# Patient Record
Sex: MALE | Race: BLACK OR AFRICAN AMERICAN | Employment: UNEMPLOYED | URBAN - METROPOLITAN AREA
[De-identification: names, ages, dates, MRNs, and addresses within clinical notes are randomized per-mention and may not be internally consistent; named-entity substitution may affect disease eponyms.]

---

## 2017-01-09 DIAGNOSIS — M54.9 OTHER CHRONIC BACK PAIN: ICD-10-CM

## 2017-01-09 DIAGNOSIS — G89.29 OTHER CHRONIC BACK PAIN: ICD-10-CM

## 2017-01-10 RX ORDER — METAXALONE 400 MG/1
TABLET ORAL
Qty: 60 TAB | Refills: 2 | Status: SHIPPED | OUTPATIENT
Start: 2017-01-10 | End: 2017-04-24 | Stop reason: SDUPTHER

## 2017-01-30 ENCOUNTER — TELEPHONE (OUTPATIENT)
Dept: FAMILY MEDICINE CLINIC | Age: 52
End: 2017-01-30

## 2017-01-30 DIAGNOSIS — R79.89 ELEVATED SERUM CREATININE: Primary | ICD-10-CM

## 2017-01-30 NOTE — TELEPHONE ENCOUNTER
Referral was written wrong; everything is correct expect for the name: should say  [facility] [place]    Tel: [phone]   Fax: [phone]

## 2017-01-31 ENCOUNTER — TELEPHONE (OUTPATIENT)
Dept: FAMILY MEDICINE CLINIC | Age: 52
End: 2017-01-31

## 2017-01-31 NOTE — TELEPHONE ENCOUNTER
Scheduled for June 21st (first available with nephrology office). I would like him to have BMP checked in mid March please.       Orders Placed This Encounter    METABOLIC PANEL, BASIC     Standing Status:   Future     Standing Expiration Date:   2/1/2018

## 2017-01-31 NOTE — TELEPHONE ENCOUNTER
Contacted patient regarding new order for BMP.  Patient was given an instruction to have BW in March and verbalized his appt with nephrology in June 21, 2017 at 3 pm.

## 2017-02-03 NOTE — TELEPHONE ENCOUNTER
Done patient was made aware of new order and appt. With nephrology and referral was updated and faxed.

## 2017-04-25 RX ORDER — METAXALONE 400 MG/1
TABLET ORAL
Qty: 60 TAB | Refills: 2 | Status: SHIPPED | OUTPATIENT
Start: 2017-04-25 | End: 2017-09-05 | Stop reason: SDUPTHER

## 2017-05-30 DIAGNOSIS — I10 ESSENTIAL HYPERTENSION WITH GOAL BLOOD PRESSURE LESS THAN 130/85: ICD-10-CM

## 2017-05-30 NOTE — TELEPHONE ENCOUNTER
Pt requesting RX refill(s) for:  Requested Prescriptions     Pending Prescriptions Disp Refills    lisinopril (PRINIVIL, ZESTRIL) 20 mg tablet 30 Tab 3     Last refill: 12/19/16    Last visit: 12/09/16 (Failed to come on his recent appt on 04/26/17      Thank you    Benny Kam LPN   (Note to nurse: Needs OV)

## 2017-05-31 RX ORDER — LISINOPRIL 20 MG/1
20 TABLET ORAL DAILY
Qty: 30 TAB | Refills: 5 | Status: SHIPPED | OUTPATIENT
Start: 2017-05-31 | End: 2017-11-30 | Stop reason: SDUPTHER

## 2017-09-05 RX ORDER — METAXALONE 400 MG/1
TABLET ORAL
Qty: 60 TAB | Refills: 1 | Status: SHIPPED | OUTPATIENT
Start: 2017-09-05 | End: 2017-11-07 | Stop reason: SDUPTHER

## 2017-09-05 NOTE — TELEPHONE ENCOUNTER
Pt requesting RX refill(s) for:  Requested Prescriptions     Pending Prescriptions Disp Refills    metaxalone (SKELAXIN) 400 mg tablet [Pharmacy Med Name: METAXALONE 400 MG TABLET] 60 Tab 1     Sig: take 1 tablet by mouth twice a day if needed for MUSCLE STIFFNESS/SPASM/PAIN     Last refill:     Last visit:       Thank you    My Lopez LPN

## 2017-11-09 RX ORDER — HYDROCHLOROTHIAZIDE 12.5 MG/1
TABLET ORAL
Qty: 30 TAB | Refills: 0 | Status: SHIPPED | OUTPATIENT
Start: 2017-11-09 | End: 2017-12-26 | Stop reason: SDUPTHER

## 2017-11-09 RX ORDER — METAXALONE 400 MG/1
TABLET ORAL
Qty: 60 TAB | Refills: 1 | Status: SHIPPED | OUTPATIENT
Start: 2017-11-09 | End: 2018-02-02 | Stop reason: SDUPTHER

## 2017-12-14 RX ORDER — HYDROCHLOROTHIAZIDE 12.5 MG/1
TABLET ORAL
Qty: 30 TAB | Refills: 0 | OUTPATIENT
Start: 2017-12-14

## 2017-12-25 PROBLEM — R51.9 HEADACHE DISORDER: Chronic | Status: ACTIVE | Noted: 2017-12-25

## 2017-12-25 NOTE — PROGRESS NOTES
PRE-VISIT PLANNING:       Date Time Provider Rosalind Baca   2017 10:30 AM Gretta Rios MD [facility]       Aman Graves (PCP is Gretta Rios MD) is scheduled for an office visit with Gretta Rios MD on 2017 for follow up/medication refill. Encounter opened prior to visit to complete pre-visit planning, update and review pertinent sections in the chart. Last office visit with PCP was 16. Rooming Nurse Items:  -- Flu shot    Pending items for provider to review with patient:  -- Update BMP and CPK  -- Seen by Dr. Feng Palomares in  per pt    There are no preventive care reminders to display for this patient. Internal Medicine  Progress Note  [facility] at [address] [facility], [address]  Phone [phone]; Fax [phone]    Date of Service:  2017  Patient's Name & : Aman Graves - 1965     Assessment/Plan/Orders:       Aman Graves is a 45 y.o. male who was seen today for follow up on BP, med refills. Patient states Dr. Feng Palomares recommended increasing lisinopril dosing by 10 mg. Initial DBP elevated, improved on repeat but still > 80. Lisinopril dosing increased. Labs today. Patient to call if mood does not improve or he feels he is not coping well. The patient states understanding of recommended treatment plan.  recommendations reviewed with patient. Body mass index is 31.53 kg/(m^2). Discussed the patient's BMI with him as well as recommendations for achieving/maintaining healthy weight and BMI. Follow up BMI/weight at next visit. Patient instructions:  (see AVS)  Call if you would like to start a medication for depression. Labs today    Increase lisinopril dose from 20 mg to 30 mg (add a 10 mg tablet per day)    Follow up with Gretta Rios MD in 3 months for blood pressure (30m). Complete fasting labs 1-2 weeks prior to the appointment.   Please arrive 15 minutes prior to your scheduled appointment time. Call and request an earlier appointment if you have any new questions or concerns. Orders Placed This Encounter    LIPID PANEL     Standing Status:   Future     Number of Occurrences:   1     Standing Expiration Date:   83/86/2939    METABOLIC PANEL, COMPREHENSIVE     Standing Status:   Future     Number of Occurrences:   1     Standing Expiration Date:   6/24/2018    CK     Standing Status:   Future     Number of Occurrences:   1     Standing Expiration Date:   12/26/2018    CBC W/O DIFF     Standing Status:   Future     Number of Occurrences:   1     Standing Expiration Date:   12/25/2018    lisinopril (PRINIVIL, ZESTRIL) 10 mg tablet     Sig: Take 1 Tab by mouth daily. Dispense:  30 Tab     Refill:  5     Total daily dose = 30 mg lisinopril    lisinopril (PRINIVIL, ZESTRIL) 20 mg tablet     Sig: take 1 tablet by mouth once daily     Dispense:  30 Tab     Refill:  5     Total daily dose = 30 mg lisinopril    hydroCHLOROthiazide (HYDRODIURIL) 12.5 mg tablet     Sig: take 1 tablet by mouth every morning     Dispense:  30 Tab     Refill:  5       Karol Acosta MD - Internal Medicine  12/26/2017, 2:07 PM    HPI & ROS:     Chief Complaint   Patient presents with    Medication Evaluation        Allen Ada presents for follow up. He  has a past medical history of Allergic rhinitis due to pollen; Diverticulosis (Jan 2016); Dizziness; Essential hypertension; NRXJCKWK(608.3) (9/19/14); Helicobacter pylori gastritis (7/22/2015); Hemorrhoid (Jan 2016); Herniated disc; History of lumbar fusion (1/8/2016); Influenza vaccination declined by patient - 12/26/17 (12/26/2017); Left leg pain (5/8/2015); No-show for appointment (04/26/2017); Pancreatitis; PUD (peptic ulcer disease) (Jan 2016); and Scleral icterus (7/9/2015). Here today with spouse.       Patient Active Problem List    Diagnosis    Headache disorder     Managed by  [facility] (neurology), overdue for follow up per Janessa quick      PUD (peptic ulcer disease)     No H Pylori on biopsy, multiple small ulcers on EGD (Dr. Rona Seip)  Avoid NSAID use  Hx of H Pylori      Elevated CPK     Elevated CPKs without elevation of CPKMB and without elevation of LFTs, normal JAMISON; doubtful there is any clinical significance   Due for recheck      Hypertriglyceridemia     12/16/16:  Total chol 156, HDL 36, LDL 98,   Due for fasting labs      Essential hypertension     Lisinopril 20 mg daily, hydrochlorothiazide 12.5 mg daily   Due for labs, overdue for follow up - med refills needed      Chronic back pain     PRN Skelaxin, avoid NSAID use due to hx of PUD       Notes he has been feeling down/depressed for about 2 weeks. His younger sister had a stroke 2 weeks ago. Doesn't feel medication is warranted at this time.       Review of Systems (positives in bold)   General ROS:  chills, fatigue, fever, hot flashes, malaise, night sweats, sleep disturbance, weight gain, weight loss  Neurological ROS: behavioral changes, bowel and bladder control changes, confusion, dizziness, gait disturbance, headaches, impaired coordination/balance, memory loss, numbness/tingling, seizures, tremors, visual changes, focal weakness  Cardiovascular ROS: chest pain, dyspnea on exertion, edema, irregular heartbeat, loss of consciousness, murmur, orthopnea, palpitations, paroxysmal nocturnal dyspnea, rapid heart rate, shortness of breath  Respiratory ROS: cough, hemoptysis, orthopnea, pleuritic pain, shortness of breath, sputum changes, stridor, tachypnea, wheezing  Gastrointestinal ROS: abdominal pain, appetite loss, blood in stools, change in bowel habits, constipation, diarrhea, gas/bloating, heartburn, hematemesis, melena, nausea/vomiting, stool incontinence, swallowing difficulty/pain, early satiety  Genito-Urinary ROS: dysuria, urinary frequency, urinary urgency, urinary incontinence, hematuria, malodorous urine, flank pain, genital discharge, genital ulcers,  pelvic pain, change in urinary stream, erectile dysfunction, nocturia, scrotal mass/pain   Endocrine ROS: breast changes, galactorrhea, hair pattern changes, hot flashes, malaise/lethargy, mood swings, palpitations, polydipsia/polyuria, skin changes, temperature intolerance, unexplained weight changes  Psychological ROS: anxiety, behavioral disorder, concentration difficulties, decreased libido, depressed mood, disorientation, hallucinations, irritability, mood swings, obsessive thoughts, sleep disturbances, suicidal ideation, homicidal ideation       Objective:       VS:    Visit Vitals    /84 (BP 1 Location: Right arm, BP Patient Position: Sitting)  Comment: Manual BP    Pulse 78    Temp 97 °F (36.1 °C) (Oral)    Resp 20    Ht 6' 1\" (1.854 m)    Wt 239 lb (108.4 kg)    SpO2 96%    BMI 31.53 kg/m2       General:   Well-appearing male seated comfortably, overweight,        Well-nourished, well-groomed, conversant, alert, in no acute distress. Psych:  Affect appropriate,  interactive, facies and mood are congruent,     behavior and conversation are appropriate, thought process linear and logical     Memory appears to be normal throughout interview  HEENT:  Normocephalic, atraumatic, MMM, PERRL, EOMI   Neck:  Neck supple with normal ROM for age    No thyromegaly or nodules, no LAD  Cardiovasc:   Regular rate and rhythm, no murmurs, no rubs, no gallops  Pulmonary:   Clear breath sounds bilaterally, good air movement,    No wheezing, no rales, no rhonchi, normal respiratory effort  Abdomen:   Abdomen soft, nontender, nondistended, NABS, no masses  Extremities:   No pitting dependent edema    No tenderness with palpation of calves    Warm and well-perfused at distal extremities  Neuro:   Alert, conversant, following commands, no focal deficits.      Ambulating with narrow based, stable gait without use of assistive device  Skin:    Warm, dry, normal pallor, no rashes noted. Additional History     Past Medical History:   Diagnosis Date    Allergic rhinitis due to pollen    Attila Shield Diverticulosis Jan 2016    Colonoscopy - Dr Anna Cook hypertension     RQQAYNYW(220.2) 6/39/11    Helicobacter pylori gastritis 7/22/2015    Clarithromycin, Amoxil, and Omeprazole BID x 14 days prescribed    Hemorrhoid Jan 2016    Colonoscopy - Dr Perales Bound Herniated disc     per pt multiple levels    History of lumbar fusion 1/8/2016    Influenza vaccination declined by patient - 12/26/17 12/26/2017    Left leg pain 5/8/2015    Muscular soreness in posterior lower thigh and upper calf     No-show for appointment 04/26/2017 04/26/2017    Pancreatitis     PUD (peptic ulcer disease) Jan 2016    No H Pylori on biopsy, multiple small ulcers on EGD (Dr. Nereida Zayas)    Scleral icterus 7/9/2015     Past Surgical History:   Procedure Laterality Date    HX COLONOSCOPY N/A Jan 2016    Dr. Nereida Zayas - hemorrhoids and diverticulosis, 10 year interval    HX ENDOSCOPY N/A Jan 2016    Dr. Nereida Zayas - multiple small ulcers, neg H pylori    HX LUMBAR FUSION       Social History   Substance Use Topics    Smoking status: Former Smoker     Types: Cigars    Smokeless tobacco: Never Used    Alcohol use 1.5 oz/week     3 drink(s) per week     Current Outpatient Prescriptions   Medication Sig    lisinopril (PRINIVIL, ZESTRIL) 10 mg tablet Take 1 Tab by mouth daily.  lisinopril (PRINIVIL, ZESTRIL) 20 mg tablet take 1 tablet by mouth once daily    hydroCHLOROthiazide (HYDRODIURIL) 12.5 mg tablet take 1 tablet by mouth every morning    clotrimazole-betamethasone (LOTRISONE) topical cream apply to affected area twice a day    metaxalone (SKELAXIN) 400 mg tablet take 1 tablet by mouth twice a day if needed FOR MUSCLE STIFFNESS/SPASMS/PAIN    Omeprazole delayed release (PRILOSEC D/R) 20 mg tablet Take 1 Tab by mouth two (2) times a day.  Indications: HELICOBACTER PYLORI GASTRITIS    amitriptyline (ELAVIL) 25 mg tablet Take 1 Tab by mouth nightly. Take amitriptyline about 1 hour before bedtime    ondansetron (ZOFRAN ODT) 4 mg disintegrating tablet Take 1 Tab by mouth every eight (8) hours as needed for Nausea. No current facility-administered medications for this visit. No Known Allergies    Encounter Diagnoses:     Encounter Diagnoses     ICD-10-CM ICD-9-CM   1. Essential hypertension I10 401.9   2. Hypertriglyceridemia E78.1 272.1   3. PUD (peptic ulcer disease) K27.9 533.90   4. Headache disorder R51 784.0   5. Elevated CPK R74.8 790.5   6. Essential hypertension with goal blood pressure less than 130/85 I10 401.9   7. Influenza vaccination declined by patient - 12/26/17 Z28.21 V64.06            This document may have been created with the aid of dictation software.   Text may contain errors, particularly phonetic errors

## 2017-12-26 ENCOUNTER — HOSPITAL ENCOUNTER (OUTPATIENT)
Dept: LAB | Age: 52
Discharge: HOME | End: 2017-12-26

## 2017-12-26 ENCOUNTER — OFFICE VISIT (OUTPATIENT)
Dept: FAMILY MEDICINE CLINIC | Age: 52
End: 2017-12-26

## 2017-12-26 VITALS
DIASTOLIC BLOOD PRESSURE: 84 MMHG | HEART RATE: 78 BPM | OXYGEN SATURATION: 96 % | WEIGHT: 239 LBS | HEIGHT: 73 IN | RESPIRATION RATE: 20 BRPM | SYSTOLIC BLOOD PRESSURE: 124 MMHG | BODY MASS INDEX: 31.68 KG/M2 | TEMPERATURE: 97 F

## 2017-12-26 DIAGNOSIS — K27.9 PUD (PEPTIC ULCER DISEASE): ICD-10-CM

## 2017-12-26 DIAGNOSIS — I10 ESSENTIAL HYPERTENSION: Primary | Chronic | ICD-10-CM

## 2017-12-26 DIAGNOSIS — E78.1 HYPERTRIGLYCERIDEMIA: Chronic | ICD-10-CM

## 2017-12-26 DIAGNOSIS — R51.9 HEADACHE DISORDER: Chronic | ICD-10-CM

## 2017-12-26 DIAGNOSIS — I10 ESSENTIAL HYPERTENSION WITH GOAL BLOOD PRESSURE LESS THAN 130/85: ICD-10-CM

## 2017-12-26 DIAGNOSIS — R74.8 ELEVATED CK: Chronic | ICD-10-CM

## 2017-12-26 DIAGNOSIS — Z28.21 INFLUENZA VACCINATION DECLINED BY PATIENT: ICD-10-CM

## 2017-12-26 PROCEDURE — 99001 SPECIMEN HANDLING PT-LAB: CPT | Performed by: INTERNAL MEDICINE

## 2017-12-26 RX ORDER — LISINOPRIL 10 MG/1
10 TABLET ORAL DAILY
Qty: 30 TAB | Refills: 5 | Status: SHIPPED | OUTPATIENT
Start: 2017-12-26 | End: 2018-02-23 | Stop reason: SDUPTHER

## 2017-12-26 RX ORDER — LISINOPRIL 20 MG/1
TABLET ORAL
Qty: 30 TAB | Refills: 5 | Status: SHIPPED | OUTPATIENT
Start: 2017-12-26 | End: 2018-07-22 | Stop reason: SDUPTHER

## 2017-12-26 RX ORDER — HYDROCHLOROTHIAZIDE 12.5 MG/1
TABLET ORAL
Qty: 30 TAB | Refills: 5 | Status: SHIPPED | OUTPATIENT
Start: 2017-12-26 | End: 2018-07-31 | Stop reason: SDUPTHER

## 2017-12-26 NOTE — PROGRESS NOTES
Gregoria Saba is a 45 y.o. male (: 1965) presenting to address:    Chief Complaint   Patient presents with    Medication Evaluation       Vitals:    17 1045   BP: (!) 134/95   Pulse: 78   Resp: 20   Temp: 97 °F (36.1 °C)   TempSrc: Oral   SpO2: 96%   Weight: 239 lb (108.4 kg)   Height: 6' 1\" (1.854 m)   PainSc:   0 - No pain       Hearing/Vision:   No exam data present    Learning Assessment:     Learning Assessment 2015   PRIMARY LEARNER Patient   HIGHEST LEVEL OF EDUCATION - PRIMARY LEARNER  GRADUATED HIGH SCHOOL OR GED   BARRIERS PRIMARY LEARNER NONE   CO-LEARNER CAREGIVER No   PRIMARY LANGUAGE ENGLISH   LEARNER PREFERENCE PRIMARY OTHER (COMMENT)   ANSWERED BY patient   RELATIONSHIP SELF     Depression Screening:     PHQ over the last two weeks 2017   Little interest or pleasure in doing things Several days   Feeling down, depressed or hopeless Several days   Total Score PHQ 2 2     Fall Risk Assessment:     Fall Risk Assessment, last 12 mths 2016   Able to walk? Yes   Fall in past 12 months? No     Abuse Screening:     Abuse Screening Questionnaire 2016   Do you ever feel afraid of your partner? N   Are you in a relationship with someone who physically or mentally threatens you? N   Is it safe for you to go home? Y     Coordination of Care Questionaire:   1. Have you been to the ER, urgent care clinic since your last visit? Hospitalized since your last visit? NO    2. Have you seen or consulted any other health care providers outside of the [address] [place] [address] since your last visit? Include any pap smears or colon screening. NO    Advanced Directive:   1. Do you have an Advanced Directive? NO    2. Would you like information on Advanced Directives?  YES, Forms given

## 2017-12-26 NOTE — PATIENT INSTRUCTIONS
No visits with results within 2 Week(s) from this visit. Latest known visit with results is:    Orders Only on 12/12/2016   Component Date Value Ref Range Status    WBC 12/16/2016 7.8  3.4 - 10.8 x10E3/uL Final    RBC 12/16/2016 5.10  4.14 - 5.80 x10E6/uL Final    HGB 12/16/2016 14.9  12.6 - 17.7 g/dL Final    HCT 12/16/2016 44.0  37.5 - 51.0 % Final    MCV 12/16/2016 86  79 - 97 fL Final    MCH 12/16/2016 29.2  26.6 - 33.0 pg Final    MCHC 12/16/2016 33.9  31.5 - 35.7 g/dL Final    RDW 12/16/2016 14.2  12.3 - 15.4 % Final    PLATELET 98/11/7883 962  150 - 379 x10E3/uL Final    NEUTROPHILS 12/16/2016 57  % Final    Lymphocytes 12/16/2016 32  % Final    MONOCYTES 12/16/2016 8  % Final    EOSINOPHILS 12/16/2016 3  % Final    BASOPHILS 12/16/2016 0  % Final    ABS. NEUTROPHILS 12/16/2016 4.4  1.4 - 7.0 x10E3/uL Final    Abs Lymphocytes 12/16/2016 2.5  0.7 - 3.1 x10E3/uL Final    ABS. MONOCYTES 12/16/2016 0.6  0.1 - 0.9 x10E3/uL Final    ABS. EOSINOPHILS 12/16/2016 0.3  0.0 - 0.4 x10E3/uL Final    ABS. BASOPHILS 12/16/2016 0.0  0.0 - 0.2 x10E3/uL Final    IMMATURE GRANULOCYTES 12/16/2016 0  % Final    ABS. IMM. GRANS. 12/16/2016 0.0  0.0 - 0.1 x10E3/uL Final    Glucose 12/16/2016 95  65 - 99 mg/dL Final    Comment: Specimen received in contact with cells. Hemolysis present. GLUC may  be decreased and K increased. Clinical correlation indicated.       BUN 12/16/2016 18  6 - 24 mg/dL Final    Creatinine 12/16/2016 1.41* 0.76 - 1.27 mg/dL Final    GFR est non-AA 12/16/2016 56* >59 mL/min/1.73 Final    GFR est AA 12/16/2016 65  >59 mL/min/1.73 Final    BUN/Creatinine ratio 12/16/2016 13  9 - 20 Final    Sodium 12/16/2016 145* 134 - 144 mmol/L Final                  **Please note reference interval change**    Potassium 12/16/2016 4.2  3.5 - 5.2 mmol/L Final    Chloride 12/16/2016 101  96 - 106 mmol/L Final                  **Please note reference interval change**    CO2 12/16/2016 23  18 - 29 mmol/L Final    Calcium 12/16/2016 9.9  8.7 - 10.2 mg/dL Final    Protein, total 12/16/2016 7.6  6.0 - 8.5 g/dL Final    Albumin 12/16/2016 4.7  3.5 - 5.5 g/dL Final    GLOBULIN, TOTAL 12/16/2016 2.9  1.5 - 4.5 g/dL Final    A-G Ratio 12/16/2016 1.6  1.1 - 2.5 Final    Bilirubin, total 12/16/2016 0.5  0.0 - 1.2 mg/dL Final    Alk. phosphatase 12/16/2016 50  39 - 117 IU/L Final    AST (SGOT) 12/16/2016 18  0 - 40 IU/L Final    ALT (SGPT) 12/16/2016 18  0 - 44 IU/L Final    Hemoglobin A1c 12/16/2016 6.3* 4.8 - 5.6 % Final    Comment:          Pre-diabetes: 5.7 - 6.4           Diabetes: >6.4           Glycemic control for adults with diabetes: <7.0      Estimated average glucose 12/16/2016 134  mg/dL Final    Cholesterol, total 12/16/2016 156  100 - 199 mg/dL Final    Triglyceride 12/16/2016 111  0 - 149 mg/dL Final    HDL Cholesterol 12/16/2016 36* >39 mg/dL Final    VLDL, calculated 12/16/2016 22  5 - 40 mg/dL Final    LDL, calculated 12/16/2016 98  0 - 99 mg/dL Final    TSH 12/16/2016 2.450  0.450 - 4.500 uIU/mL Final    T4, Free 12/16/2016 0.96  0.82 - 1.77 ng/dL Final    Specific Gravity 12/16/2016 1.018  1.005 - 1.030 Final    pH (UA) 12/16/2016 6.0  5.0 - 7.5 Final    Color 12/16/2016 Yellow  Yellow Final    Appearance 12/16/2016 Cloudy* Clear Final    Leukocyte Esterase 12/16/2016 Negative  Negative Final    Protein 12/16/2016 Negative  Negative/Trace Final    Glucose 12/16/2016 Negative  Negative Final    Ketone 12/16/2016 Negative  Negative Final    Blood 12/16/2016 Negative  Negative Final    Bilirubin 12/16/2016 Negative  Negative Final    Urobilinogen 12/16/2016 0.2  0.2 - 1.0 mg/dL Final    Nitrites 12/16/2016 Negative  Negative Final    Microscopic Examination 12/16/2016 Comment   Final    Microscopic not indicated and not performed.     Creatine Kinase,Total 12/16/2016 231* 24 - 204 U/L Final    INTERPRETATION 12/16/2016 NTAP   Final    Interpretation 12/16/2016 Note Final    Supplement report is available. AFTER VISIT INSTRUCTIONS       Call if you would like to start a medication for depression. Labs today    Increase lisinopril dose from 20 mg to 30 mg (add a 10 mg tablet per day)    Follow up with Christopher Abebe MD in 3 months for blood pressure (30m). Complete fasting labs 1-2 weeks prior to the appointment. Please arrive 15 minutes prior to your scheduled appointment time. Call and request an earlier appointment if you have any new questions or concerns. LAB HOURS AT [facility]     Monday-Friday 7a-3p  Closed on holidays    TESTING RESULTS     You will be contacted if your lab results indicate a change in therapy or further evaluation. Results of tests performed in this office will be viewable through [facility]. If you need assistance with accessing your [employer] account, you can call the [address] [place] [address] at #[phone]. STAY UP TO DATE WITH YOUR PREVENTIVE HEALTH   Please review the following recommendations and if you are not sure that your healthcare is up to date, ask your provider at your next scheduled office visit. -- Yearly preventive visits (sometimes called \"physicals\") are recommended for all adults. Medicare and Medicaid do not pay for physicals. Medicare covers a yearly wellness visit that differs in many ways from a traditional physical, but is an opportunity to make sure you are maximizing the preventive services that will be covered by Medicare. Each insurance carrier will have different regulations about what services may be covered, so be sure to talk with your insurance provider before scheduling a visit. -- Colon cancer screening is recommended for all patients 48 and older with either colonoscopy (interval will vary depending on results) or yearly stool testing.      -- Mammogram is recommended every 2 years for women ages 36 to 76.   -- Pap smear is recommended every 3-5 years for women aged 24 to 72, unless your cervix has been surgically removed for benign reasons. -- Flu shot is recommended every fall for adults of all ages. -- Prevnar 15 is recommended at the age of 72 for all adults. -- Pneumovax is recommended one year after Prevnar 13 for all adults, but earlier if you have a history of chronic health problems (including diabetes and asthma) or smoking. -- Tetanus boosters are recommended every 10 years for adults of all ages. Medicare and Medicaid do not cover tetanus as a preventive booster, but will pay for patients to receive a booster in the event of certain injuries. MEDICATION REFILLS      -- Controlled substance refills must be obtained during a scheduled office visit with the provider. -- All other medication refills are to be requested by calling your pharmacy during regular office hours. Allow at least 2 business days for processing. Refills will not be provided by the after hours answering service/on call provider. HOLIDAY CLOSURES:     The office is closed on six major holidays each year:  New Year's Day, July 4th, Memorial Day, Labor Day, Thanksgiving, and Ivonne Day. Lab and X-ray services are not available on those days.

## 2017-12-26 NOTE — MR AVS SNAPSHOT
Visit Information Date & Time Provider Department Dept. Phone Encounter #  
 12/26/2017 10:30 AM Clarice Velazquez MD [employer] [phone] [id number] Upcoming Health Maintenance Date Due COLONOSCOPY 1/20/2026 DTaP/Tdap/Td series (2 - Td) 12/26/2027 Allergies as of 12/26/2017  Review Complete On: 12/26/2017 By: Clarice Velazquez MD  
 No Known Allergies Current Immunizations  Reviewed on 6/14/2016 No immunizations on file. Not reviewed this visit You Were Diagnosed With   
  
 Codes Comments Essential hypertension    -  Primary ICD-10-CM: I10 
ICD-9-CM: 401.9 Hypertriglyceridemia     ICD-10-CM: E78.1 ICD-9-CM: 272.1 PUD (peptic ulcer disease)     ICD-10-CM: K27.9 ICD-9-CM: 533.90 Headache disorder     ICD-10-CM: R46 ICD-9-CM: 784.0 Elevated CK     ICD-10-CM: R74.8 ICD-9-CM: 790.5 Essential hypertension with goal blood pressure less than 130/85     ICD-10-CM: I10 
ICD-9-CM: 401.9 Influenza vaccination declined by patient     ICD-10-CM: Z28.21 ICD-9-CM: V64.06 Vitals BP Pulse Temp Resp Height(growth percentile) Weight(growth percentile) 124/84 (BP 1 Location: Right arm, BP Patient Position: Sitting) 78 97 °F (36.1 °C) (Oral) 20 6' 1\" (1.854 m) 239 lb (108.4 kg) SpO2 BMI Smoking Status 96% 31.53 kg/m2 Former Smoker Vitals History BMI and BSA Data Body Mass Index Body Surface Area  
 31.53 kg/m 2 2.36 m 2 Preferred Pharmacy Pharmacy Name Phone [address], [address] [facility] [phone] Your Updated Medication List  
  
   
This list is accurate as of: 12/26/17 11:03 AM.  Always use your most recent med list.  
  
  
  
  
 amitriptyline 25 mg tablet Commonly known as:  ELAVIL Take 1 Tab by mouth nightly.  Take amitriptyline about 1 hour before bedtime  
  
 clotrimazole-betamethasone topical cream  
 Commonly known as:  LOTRISONE  
apply to affected area twice a day  
  
 hydroCHLOROthiazide 12.5 mg tablet Commonly known as:  HYDRODIURIL  
take 1 tablet by mouth every morning * lisinopril 10 mg tablet Commonly known as:  Sanderson Justin Take 1 Tab by mouth daily. * lisinopril 20 mg tablet Commonly known as:  PRINIVIL, ZESTRIL  
take 1 tablet by mouth once daily  
  
 metaxalone 400 mg tablet Commonly known as:  SKELAXIN  
take 1 tablet by mouth twice a day if needed FOR MUSCLE STIFFNESS/SPASMS/PAIN Omeprazole delayed release 20 mg tablet Commonly known as:  PRILOSEC D/R Take 1 Tab by mouth two (2) times a day. Indications: HELICOBACTER PYLORI GASTRITIS  
  
 ondansetron 4 mg disintegrating tablet Commonly known as:  ZOFRAN ODT Take 1 Tab by mouth every eight (8) hours as needed for Nausea. * Notice: This list has 2 medication(s) that are the same as other medications prescribed for you. Read the directions carefully, and ask your doctor or other care provider to review them with you. Prescriptions Sent to Pharmacy Refills  
 lisinopril (PRINIVIL, ZESTRIL) 10 mg tablet 5 Sig: Take 1 Tab by mouth daily. Class: Normal  
 Pharmacy: [address], [address] [place] Ph #: [phone] Route: Oral  
 lisinopril (PRINIVIL, ZESTRIL) 20 mg tablet 5 Sig: take 1 tablet by mouth once daily Class: Normal  
 Pharmacy: [facility],  [place] Ph #: [phone]  
 hydroCHLOROthiazide (HYDRODIURIL) 12.5 mg tablet 5 Sig: take 1 tablet by mouth every morning Class: Normal  
 Pharmacy: [address], [address] [place] Ph #: [phone] To-Do List   
 12/26/2017 Lab:  CBC W/O DIFF   
  
 12/26/2017 Lab:  CK   
  
 12/26/2017 Lab:  LIPID PANEL   
  
 12/26/2017   Lab:  METABOLIC PANEL, COMPREHENSIVE   
  
  
 Patient Instructions No visits with results within 2 Week(s) from this visit. Latest known visit with results is: 
 
Orders Only on 12/12/2016 Component Date Value Ref Range Status  WBC 12/16/2016 7.8  3.4 - 10.8 x10E3/uL Final  
 RBC 12/16/2016 5.10  4.14 - 5.80 x10E6/uL Final  
 HGB 12/16/2016 14.9  12.6 - 17.7 g/dL Final  
 HCT 12/16/2016 44.0  37.5 - 51.0 % Final  
 MCV 12/16/2016 86  79 - 97 fL Final  
 MCH 12/16/2016 29.2  26.6 - 33.0 pg Final  
 MCHC 12/16/2016 33.9  31.5 - 35.7 g/dL Final  
 RDW 12/16/2016 14.2  12.3 - 15.4 % Final  
 PLATELET 44/78/3399 654  150 - 379 x10E3/uL Final  
 NEUTROPHILS 12/16/2016 57  % Final  
 Lymphocytes 12/16/2016 32  % Final  
 MONOCYTES 12/16/2016 8  % Final  
 EOSINOPHILS 12/16/2016 3  % Final  
 BASOPHILS 12/16/2016 0  % Final  
 ABS. NEUTROPHILS 12/16/2016 4.4  1.4 - 7.0 x10E3/uL Final  
 Abs Lymphocytes 12/16/2016 2.5  0.7 - 3.1 x10E3/uL Final  
 ABS. MONOCYTES 12/16/2016 0.6  0.1 - 0.9 x10E3/uL Final  
 ABS. EOSINOPHILS 12/16/2016 0.3  0.0 - 0.4 x10E3/uL Final  
 ABS. BASOPHILS 12/16/2016 0.0  0.0 - 0.2 x10E3/uL Final  
 IMMATURE GRANULOCYTES 12/16/2016 0  % Final  
 ABS. IMM. GRANS. 12/16/2016 0.0  0.0 - 0.1 x10E3/uL Final  
 Glucose 12/16/2016 95  65 - 99 mg/dL Final  
 Comment: Specimen received in contact with cells. Hemolysis present. GLUC may 
be decreased and K increased. Clinical correlation indicated.  BUN 12/16/2016 18  6 - 24 mg/dL Final  
 Creatinine 12/16/2016 1.41* 0.76 - 1.27 mg/dL Final  
 GFR est non-AA 12/16/2016 56* >59 mL/min/1.73 Final  
 GFR est AA 12/16/2016 65  >59 mL/min/1.73 Final  
 BUN/Creatinine ratio 12/16/2016 13  9 - 20 Final  
 Sodium 12/16/2016 145* 134 - 144 mmol/L Final  
               **Please note reference interval change**  Potassium 12/16/2016 4.2  3.5 - 5.2 mmol/L Final  
 Chloride 12/16/2016 101  96 - 106 mmol/L Final  
               **Please note reference interval change**  
  CO2 12/16/2016 23  18 - 29 mmol/L Final  
 Calcium 12/16/2016 9.9  8.7 - 10.2 mg/dL Final  
 Protein, total 12/16/2016 7.6  6.0 - 8.5 g/dL Final  
 Albumin 12/16/2016 4.7  3.5 - 5.5 g/dL Final  
 GLOBULIN, TOTAL 12/16/2016 2.9  1.5 - 4.5 g/dL Final  
 A-G Ratio 12/16/2016 1.6  1.1 - 2.5 Final  
 Bilirubin, total 12/16/2016 0.5  0.0 - 1.2 mg/dL Final  
 Alk. phosphatase 12/16/2016 50  39 - 117 IU/L Final  
 AST (SGOT) 12/16/2016 18  0 - 40 IU/L Final  
 ALT (SGPT) 12/16/2016 18  0 - 44 IU/L Final  
 Hemoglobin A1c 12/16/2016 6.3* 4.8 - 5.6 % Final  
 Comment:          Pre-diabetes: 5.7 - 6.4 Diabetes: >6.4 Glycemic control for adults with diabetes: <7.0  Estimated average glucose 12/16/2016 134  mg/dL Final  
 Cholesterol, total 12/16/2016 156  100 - 199 mg/dL Final  
 Triglyceride 12/16/2016 111  0 - 149 mg/dL Final  
 HDL Cholesterol 12/16/2016 36* >39 mg/dL Final  
 VLDL, calculated 12/16/2016 22  5 - 40 mg/dL Final  
 LDL, calculated 12/16/2016 98  0 - 99 mg/dL Final  
 TSH 12/16/2016 2.450  0.450 - 4.500 uIU/mL Final  
 T4, Free 12/16/2016 0.96  0.82 - 1.77 ng/dL Final  
 Specific Gravity 12/16/2016 1.018  1.005 - 1.030 Final  
 pH (UA) 12/16/2016 6.0  5.0 - 7.5 Final  
 Color 12/16/2016 Yellow  Yellow Final  
 Appearance 12/16/2016 Cloudy* Clear Final  
 Leukocyte Esterase 12/16/2016 Negative  Negative Final  
 Protein 12/16/2016 Negative  Negative/Trace Final  
 Glucose 12/16/2016 Negative  Negative Final  
 Ketone 12/16/2016 Negative  Negative Final  
 Blood 12/16/2016 Negative  Negative Final  
 Bilirubin 12/16/2016 Negative  Negative Final  
 Urobilinogen 12/16/2016 0.2  0.2 - 1.0 mg/dL Final  
 Nitrites 12/16/2016 Negative  Negative Final  
 Microscopic Examination 12/16/2016 Comment   Final  
 Microscopic not indicated and not performed.   
 Creatine Kinase,Total 12/16/2016 231* 24 - 204 U/L Final  
 INTERPRETATION 12/16/2016 NTAP   Final  
  Interpretation 12/16/2016 Note   Final  
 Supplement report is available. AFTER VISIT INSTRUCTIONS Call if you would like to start a medication for depression. Labs today Increase lisinopril dose from 20 mg to 30 mg (add a 10 mg tablet per day) Follow up with Yancy Hansen MD in 3 months for blood pressure (30m). Complete fasting labs 1-2 weeks prior to the appointment. Please arrive 15 minutes prior to your scheduled appointment time. Call and request an earlier appointment if you have any new questions or concerns. LAB HOURS AT [facility] Monday-Friday 7a-3p Closed on holidays TESTING RESULTS You will be contacted if your lab results indicate a change in therapy or further evaluation. Results of tests performed in this office will be viewable through [facility]. If you need assistance with accessing your [employer] account, you can call the [address] [place], [place] [address] at #[phone]. STAY UP  [address] Please review the following recommendations and if you are not sure that your healthcare is up to date, ask your provider at your next scheduled office visit. -- Yearly preventive visits (sometimes called \"physicals\") are recommended for all adults. Medicare and Medicaid do not pay for physicals. Medicare covers a yearly wellness visit that differs in many ways from a traditional physical, but is an opportunity to make sure you are maximizing the preventive services that will be covered by Medicare. Each insurance carrier will have different regulations about what services may be covered, so be sure to talk with your insurance provider before scheduling a visit. -- Colon cancer screening is recommended for all patients 48 and older with either colonoscopy (interval will vary depending on results) or yearly stool testing.   
 
-- Mammogram is recommended every 2 years for women ages 36 to 76. -- Pap smear is recommended every 3-5 years for women aged 24 to 72, unless your cervix has been surgically removed for benign reasons. -- Flu shot is recommended every fall for adults of all ages. -- Prevnar 15 is recommended at the age of 72 for all adults. -- Pneumovax is recommended one year after Prevnar 13 for all adults, but earlier if you have a history of chronic health problems (including diabetes and asthma) or smoking. -- Tetanus boosters are recommended every 10 years for adults of all ages. Medicare and Medicaid do not cover tetanus as a preventive booster, but will pay for patients to receive a booster in the event of certain injuries. MEDICATION REFILLS   
 
-- Controlled substance refills must be obtained during a scheduled office visit with the provider. -- All other medication refills are to be requested by calling your pharmacy during regular office hours. Allow at least 2 business days for processing. Refills will not be provided by the after hours answering service/on call provider. HOLIDAY CLOSURES:  
 
The office is closed on six major holidays each year:  New Year's Day, July 4th, Memorial Day, Labor Day, Thanksgiving, and Mascot Day. Lab and X-ray services are not available on those days. Introducing [facility]! Dear [employer]: Thank you for requesting a [employer] account. Our records indicate that you already have an active [employer] account. You can access your account anytime at [email or web address] Did you know that you can access your hospital and ER discharge instructions at any time in [employer]? You can also review all of your test results from your hospital stay or ER visit. Additional Information If you have questions, please visit the Frequently Asked Questions section of the [employer] website at [email or web address] Remember, [employer] is NOT to be used for urgent needs. For medical emergencies, dial 911. Now available from your iPhone and Android! Please provide this summary of care documentation to your next provider. Your primary care clinician is listed as [place]. If you have any questions after today's visit, please call [phone].

## 2017-12-27 LAB
ALBUMIN SERPL-MCNC: 4.1 G/DL (ref 3.5–5.5)
ALBUMIN/GLOB SERPL: 1.3 {RATIO} (ref 1.2–2.2)
ALP SERPL-CCNC: 65 IU/L (ref 39–117)
ALT SERPL-CCNC: 37 IU/L (ref 0–44)
AST SERPL-CCNC: 25 IU/L (ref 0–40)
BILIRUB SERPL-MCNC: 0.3 MG/DL (ref 0–1.2)
BUN SERPL-MCNC: 12 MG/DL (ref 6–24)
BUN/CREAT SERPL: 9 (ref 9–20)
CALCIUM SERPL-MCNC: 9.1 MG/DL (ref 8.7–10.2)
CHLORIDE SERPL-SCNC: 100 MMOL/L (ref 96–106)
CHOLEST SERPL-MCNC: 187 MG/DL (ref 100–199)
CK SERPL-CCNC: 214 U/L (ref 24–204)
CO2 SERPL-SCNC: 25 MMOL/L (ref 18–29)
CREAT SERPL-MCNC: 1.35 MG/DL (ref 0.76–1.27)
ERYTHROCYTE [DISTWIDTH] IN BLOOD BY AUTOMATED COUNT: 15.1 % (ref 12.3–15.4)
GLOBULIN SER CALC-MCNC: 3.1 G/DL (ref 1.5–4.5)
GLUCOSE SERPL-MCNC: 131 MG/DL (ref 65–99)
HCT VFR BLD AUTO: 43 % (ref 37.5–51)
HDLC SERPL-MCNC: 36 MG/DL
HGB BLD-MCNC: 14.4 G/DL (ref 13–17.7)
INTERPRETATION, 910389: NORMAL
INTERPRETATION: NORMAL
LDLC SERPL CALC-MCNC: 103 MG/DL (ref 0–99)
MCH RBC QN AUTO: 28.9 PG (ref 26.6–33)
MCHC RBC AUTO-ENTMCNC: 33.5 G/DL (ref 31.5–35.7)
MCV RBC AUTO: 86 FL (ref 79–97)
PDF IMAGE, 910387: NORMAL
PLATELET # BLD AUTO: 302 X10E3/UL (ref 150–379)
POTASSIUM SERPL-SCNC: 4 MMOL/L (ref 3.5–5.2)
PROT SERPL-MCNC: 7.2 G/DL (ref 6–8.5)
RBC # BLD AUTO: 4.98 X10E6/UL (ref 4.14–5.8)
SODIUM SERPL-SCNC: 142 MMOL/L (ref 134–144)
TRIGL SERPL-MCNC: 239 MG/DL (ref 0–149)
VLDLC SERPL CALC-MCNC: 48 MG/DL (ref 5–40)
WBC # BLD AUTO: 9.9 X10E3/UL (ref 3.4–10.8)

## 2018-01-02 RX ORDER — ROSUVASTATIN CALCIUM 20 MG/1
20 TABLET, COATED ORAL
Qty: 30 TAB | Refills: 2 | Status: SHIPPED | OUTPATIENT
Start: 2018-01-02 | End: 2018-02-02 | Stop reason: SDUPTHER

## 2018-01-02 NOTE — PROGRESS NOTES
Call patient with recommendations:  Testing showed stable kidney function compared to last check, but slightly worse compared to 2 years ago. Cholesterol levels are high. 10 year calculated risk of having stroke or heart attack is 10.7%. Rosuvastatin 20 mg by mouth nightly is recommended to reduce cholesterol and lower risk. Script sent to pharmacy. Schedule a follow up visit in 3 months, complete fasting labs before visit.

## 2018-01-22 DIAGNOSIS — F07.81 POST CONCUSSIVE SYNDROME: ICD-10-CM

## 2018-01-22 DIAGNOSIS — G44.52 NEW DAILY PERSISTENT HEADACHE: ICD-10-CM

## 2018-01-23 RX ORDER — AMITRIPTYLINE HYDROCHLORIDE 25 MG/1
25 TABLET, FILM COATED ORAL
Qty: 30 TAB | Refills: 5 | Status: SHIPPED | OUTPATIENT
Start: 2018-01-23 | End: 2018-12-14 | Stop reason: SDUPTHER

## 2018-01-23 RX ORDER — CLOTRIMAZOLE AND BETAMETHASONE DIPROPIONATE 10; .64 MG/G; MG/G
CREAM TOPICAL
Qty: 30 G | Refills: 0 | Status: SHIPPED | OUTPATIENT
Start: 2018-01-23 | End: 2018-12-06 | Stop reason: SDUPTHER

## 2018-01-23 NOTE — TELEPHONE ENCOUNTER
From: Hugo Briones  To: Clarisse Healy MD  Sent: 1/22/2018 9:35 PM EST  Subject: Medication Renewal Request    Original authorizing provider: MD Luis Armando Perlaa Harada would like a refill of the following medications:  clotrimazole-betamethasone (LOTRISONE) topical cream Clarisse Healy MD]    Preferred pharmacy: [address]    Comment:  Skin rash/itch has reappeared. Cream needed again. I have run out of refills for Amitriptyline. Thank you.     Medication renewals requested in this message routed to other providers:  amitriptyline (ELAVIL) 25 mg tablet Omar Jo MD]

## 2018-01-23 NOTE — TELEPHONE ENCOUNTER
Requested Prescriptions     Pending Prescriptions Disp Refills    clotrimazole-betamethasone (LOTRISONE) topical cream 30 g 0     Sig: apply to affected area twice a day     Signed Prescriptions Disp Refills    amitriptyline (ELAVIL) 25 mg tablet 30 Tab 5     Sig: Take 1 Tab by mouth nightly.  Take amitriptyline about 1 hour before bedtime     Authorizing Provider: Chandler Seip

## 2018-02-03 RX ORDER — METAXALONE 400 MG/1
TABLET ORAL
Qty: 60 TAB | Refills: 1 | Status: SHIPPED | OUTPATIENT
Start: 2018-02-03 | End: 2018-07-10 | Stop reason: SDUPTHER

## 2018-02-06 NOTE — TELEPHONE ENCOUNTER
From: Samir Griggs  To: Sharri Cox MD  Sent: 2/2/2018 11:56 AM EST  Subject: Medication Renewal Request    Original authorizing provider: MD Devin Colon would like a refill of the following medications:  rosuvastatin (CRESTOR) 20 mg tablet Sharri Cox MD]    Preferred pharmacy: [address], [facility] [address]:

## 2018-02-08 RX ORDER — ROSUVASTATIN CALCIUM 20 MG/1
20 TABLET, COATED ORAL
Qty: 30 TAB | Refills: 2 | Status: SHIPPED | OUTPATIENT
Start: 2018-02-08 | End: 2018-07-22 | Stop reason: SDUPTHER

## 2018-02-23 NOTE — TELEPHONE ENCOUNTER
Patient pharmacy is requesting a med refill of lisinopril 10 mg    Last visit: 12/26/17    Last refill: 12/26/17

## 2018-02-24 RX ORDER — LISINOPRIL 10 MG/1
10 TABLET ORAL DAILY
Qty: 90 TAB | Refills: 1 | Status: SHIPPED | OUTPATIENT
Start: 2018-02-24 | End: 2018-12-06 | Stop reason: SDUPTHER

## 2018-03-28 DIAGNOSIS — I10 ESSENTIAL HYPERTENSION: Chronic | ICD-10-CM

## 2018-03-28 DIAGNOSIS — E78.2 MIXED HYPERLIPIDEMIA: Chronic | ICD-10-CM

## 2018-04-18 ENCOUNTER — OFFICE VISIT (OUTPATIENT)
Dept: FAMILY MEDICINE CLINIC | Age: 53
End: 2018-04-18

## 2018-04-18 DIAGNOSIS — Z91.199 NO-SHOW FOR APPOINTMENT: Primary | ICD-10-CM

## 2018-04-18 NOTE — PROGRESS NOTES
DOCUMENTATION OF CANCELLED APPOINTMENT. Tricia Guardian cancelled his scheduled appointment on 04/18/2018. Same day cancellation. Encounter Diagnoses     ICD-10-CM ICD-9-CM   1.  No-show for appointment Z53.29 V64.2

## 2018-07-10 NOTE — TELEPHONE ENCOUNTER
Prior Authorization Request was received for     Metaxalone 400 mg tablet from [address] [place] [address] 02/03/18  Qty 60  Refills 1    Last Visit: 12/26/17  No future appointments. [employer] Medicare Part D will cover tizanidine or baclofen.

## 2018-07-12 RX ORDER — METAXALONE 400 MG/1
TABLET ORAL
Qty: 60 TAB | Refills: 1 | Status: SHIPPED | OUTPATIENT
Start: 2018-07-12 | End: 2018-07-16 | Stop reason: CLARIF

## 2018-07-16 ENCOUNTER — TELEPHONE (OUTPATIENT)
Dept: FAMILY MEDICINE CLINIC | Age: 53
End: 2018-07-16

## 2018-07-16 RX ORDER — TIZANIDINE 4 MG/1
4 TABLET ORAL
Qty: 60 TAB | Refills: 1 | Status: SHIPPED | OUTPATIENT
Start: 2018-07-16 | End: 2018-12-06 | Stop reason: SDUPTHER

## 2018-07-16 NOTE — TELEPHONE ENCOUNTER
Prior Authorization Request was received for      Metaxalone 400 mg tablet from Gust[facility] Pizarro 02/03/18  Qty 60  Refills 1     Last Visit: 12/26/17  No future appointments.     [employer] Medicare Part D will cover tizanidine or baclofen.

## 2018-09-01 DIAGNOSIS — I10 ESSENTIAL HYPERTENSION WITH GOAL BLOOD PRESSURE LESS THAN 130/85: ICD-10-CM

## 2018-09-04 RX ORDER — ROSUVASTATIN CALCIUM 20 MG/1
TABLET, COATED ORAL
Qty: 30 TAB | Refills: 0 | Status: SHIPPED | OUTPATIENT
Start: 2018-09-04 | End: 2018-12-06 | Stop reason: SDUPTHER

## 2018-09-04 RX ORDER — LISINOPRIL 20 MG/1
TABLET ORAL
Qty: 30 TAB | Refills: 0 | Status: SHIPPED | OUTPATIENT
Start: 2018-09-04 | End: 2018-12-06 | Stop reason: SDUPTHER

## 2018-10-08 RX ORDER — TIZANIDINE 4 MG/1
TABLET ORAL
Qty: 60 TAB | Refills: 1 | OUTPATIENT
Start: 2018-10-08

## 2018-10-08 RX ORDER — ROSUVASTATIN CALCIUM 20 MG/1
TABLET, COATED ORAL
Qty: 30 TAB | Refills: 0 | OUTPATIENT
Start: 2018-10-08

## 2018-10-29 RX ORDER — ROSUVASTATIN CALCIUM 20 MG/1
TABLET, COATED ORAL
Qty: 30 TAB | Refills: 0 | OUTPATIENT
Start: 2018-10-29

## 2018-11-03 DIAGNOSIS — I10 ESSENTIAL HYPERTENSION WITH GOAL BLOOD PRESSURE LESS THAN 130/85: ICD-10-CM

## 2018-11-12 RX ORDER — TIZANIDINE 4 MG/1
TABLET ORAL
Qty: 60 TAB | Refills: 1 | OUTPATIENT
Start: 2018-11-12

## 2018-11-12 RX ORDER — ROSUVASTATIN CALCIUM 20 MG/1
TABLET, COATED ORAL
Qty: 30 TAB | Refills: 0 | OUTPATIENT
Start: 2018-11-12

## 2018-11-12 RX ORDER — LISINOPRIL 20 MG/1
TABLET ORAL
Qty: 30 TAB | Refills: 0 | OUTPATIENT
Start: 2018-11-12

## 2018-11-12 NOTE — TELEPHONE ENCOUNTER
Call to schedule appt for further refills. Patient is not responding to [address] notifications.

## 2018-11-19 DIAGNOSIS — G44.52 NEW DAILY PERSISTENT HEADACHE: ICD-10-CM

## 2018-11-19 DIAGNOSIS — F07.81 POST CONCUSSIVE SYNDROME: ICD-10-CM

## 2018-11-20 NOTE — TELEPHONE ENCOUNTER
Call placed to patient he states he will contact our office to get scheduled when he returns from holiday vacation

## 2018-11-27 RX ORDER — AMITRIPTYLINE HYDROCHLORIDE 25 MG/1
TABLET, FILM COATED ORAL
Qty: 30 TAB | Refills: 5 | OUTPATIENT
Start: 2018-11-27

## 2018-12-06 ENCOUNTER — OFFICE VISIT (OUTPATIENT)
Dept: FAMILY MEDICINE CLINIC | Age: 53
End: 2018-12-06

## 2018-12-06 VITALS
DIASTOLIC BLOOD PRESSURE: 90 MMHG | HEART RATE: 63 BPM | BODY MASS INDEX: 30.75 KG/M2 | RESPIRATION RATE: 18 BRPM | SYSTOLIC BLOOD PRESSURE: 140 MMHG | OXYGEN SATURATION: 97 % | WEIGHT: 232 LBS | HEIGHT: 73 IN | TEMPERATURE: 95.9 F

## 2018-12-06 DIAGNOSIS — R73.02 IGT (IMPAIRED GLUCOSE TOLERANCE): ICD-10-CM

## 2018-12-06 DIAGNOSIS — I10 ESSENTIAL HYPERTENSION: Chronic | ICD-10-CM

## 2018-12-06 DIAGNOSIS — M54.50 CHRONIC BILATERAL LOW BACK PAIN WITHOUT SCIATICA: Chronic | ICD-10-CM

## 2018-12-06 DIAGNOSIS — I10 ESSENTIAL HYPERTENSION: Primary | Chronic | ICD-10-CM

## 2018-12-06 DIAGNOSIS — R51.9 HEADACHE DISORDER: Chronic | ICD-10-CM

## 2018-12-06 DIAGNOSIS — G89.29 CHRONIC BILATERAL LOW BACK PAIN WITHOUT SCIATICA: Chronic | ICD-10-CM

## 2018-12-06 DIAGNOSIS — E78.2 MIXED HYPERLIPIDEMIA: Chronic | ICD-10-CM

## 2018-12-06 DIAGNOSIS — R74.8 ELEVATED CK: Chronic | ICD-10-CM

## 2018-12-06 DIAGNOSIS — Z28.21 INFLUENZA VACCINATION DECLINED BY PATIENT: ICD-10-CM

## 2018-12-06 RX ORDER — CLOTRIMAZOLE AND BETAMETHASONE DIPROPIONATE 10; .64 MG/G; MG/G
CREAM TOPICAL
Qty: 30 G | Refills: 1 | Status: SHIPPED | OUTPATIENT
Start: 2018-12-06

## 2018-12-06 RX ORDER — ROSUVASTATIN CALCIUM 20 MG/1
TABLET, COATED ORAL
Qty: 90 TAB | Refills: 1 | Status: SHIPPED | OUTPATIENT
Start: 2018-12-06 | End: 2019-03-22 | Stop reason: SDUPTHER

## 2018-12-06 RX ORDER — HYDROCHLOROTHIAZIDE 12.5 MG/1
TABLET ORAL
Qty: 90 TAB | Refills: 1 | Status: SHIPPED | OUTPATIENT
Start: 2018-12-06 | End: 2019-02-04 | Stop reason: SDUPTHER

## 2018-12-06 RX ORDER — TIZANIDINE 4 MG/1
4 TABLET ORAL
Qty: 90 TAB | Refills: 1 | Status: SHIPPED | OUTPATIENT
Start: 2018-12-06 | End: 2019-05-14 | Stop reason: SDUPTHER

## 2018-12-06 RX ORDER — LISINOPRIL 10 MG/1
10 TABLET ORAL DAILY
Qty: 90 TAB | Refills: 1 | Status: SHIPPED | OUTPATIENT
Start: 2018-12-06 | End: 2018-12-14 | Stop reason: SDUPTHER

## 2018-12-06 RX ORDER — LISINOPRIL 20 MG/1
TABLET ORAL
Qty: 90 TAB | Refills: 1 | Status: SHIPPED | OUTPATIENT
Start: 2018-12-06 | End: 2019-03-22 | Stop reason: SDUPTHER

## 2018-12-06 NOTE — PROGRESS NOTES
Laura Ball is a 52 y.o. male (: 1965) presenting to address:    Chief Complaint   Patient presents with    Medication Refill       Vitals:    18 0847   Pulse: 63   Resp: 18   SpO2: 97%   Weight: 232 lb (105.2 kg)   Height: 6' 1\" (1.854 m)   PainSc:   0 - No pain       Hearing/Vision:   No exam data present    Learning Assessment:     Learning Assessment 2015   PRIMARY LEARNER Patient   HIGHEST LEVEL OF EDUCATION - PRIMARY LEARNER  GRADUATED HIGH SCHOOL OR GED   BARRIERS PRIMARY LEARNER NONE   CO-LEARNER CAREGIVER No   PRIMARY LANGUAGE ENGLISH   LEARNER PREFERENCE PRIMARY OTHER (COMMENT)   ANSWERED BY patient   RELATIONSHIP SELF     Depression Screening:     PHQ over the last two weeks 2018   Little interest or pleasure in doing things Not at all   Feeling down, depressed, irritable, or hopeless Not at all   Total Score PHQ 2 0     Fall Risk Assessment:     Fall Risk Assessment, last 12 mths 2016   Able to walk? Yes   Fall in past 12 months? No     Abuse Screening:     Abuse Screening Questionnaire 2016   Do you ever feel afraid of your partner? N   Are you in a relationship with someone who physically or mentally threatens you? N   Is it safe for you to go home? Y     Coordination of Care Questionaire:   1. Have you been to the ER, urgent care clinic since your last visit? Hospitalized since your last visit? NO    2. Have you seen or consulted any other health care providers outside of the [facility] since your last visit? Include any pap smears or colon screening. NO    Advanced Directive:   1. Do you have an Advanced Directive? NO    2. Would you like information on Advanced Directives? NO      Patient declined flu shot.

## 2018-12-06 NOTE — PATIENT INSTRUCTIONS
Continue your current medications    Limit use of back brace to no more than 8 hours a day    Call for a PT referral if your back pain worsens    Routine follow up with Fercho Barnett MD in 6 months  Call office 1-2 weeks prior to appointment to confirm if testing is required before appointment. Lab hours at [facility] are 7:30am-3pm Monday-Friday. Check in 15 minutes prior to your scheduled appointment time. Call and request an earlier appointment if needed to address any questions or concerns . TESTING RESULTS   Results will be released to [facility] or sent by [address] mail. If you have questions about your results, please schedule a follow up appointment to discuss with your PCP. MEDICATION REFILLS    -- Please allow at least 2 business days for refill requests to be addressed. Medication refills may be requested through your pharmacy. Refills will not be provided by the after hours/on call provider.

## 2018-12-06 NOTE — PROGRESS NOTES
PRE-VISIT PLANNING:     PATIENT NAME:  Cyndie Carmichael   :  1965   PCP:  Carmenza Verde MD     Future Appointments   Date Time Provider Rosalind Jaquezi   2019 11:00 AM Carmenza Verde MD SUREKHA Dillon is scheduled for an office visit with Niles Campos MD on 2018 for follow up/med refill. Eligible for [facility] if time allows. Encounter opened prior to visit to complete pre-visit planning. Last office visit with PCP was 3/26/18. No show for scheduled appt 2018. Pending issues:  -- Printed script for Shingrix  -- Last labs 2017  -- Not compliant with recommended follow up for monitoring    Health Maintenance Due   Topic Date Due    Shingrix Vaccine Age 50> (1 of 2) 2013    MEDICARE YEARLY EXAM  2018       Rooming Nurse:     -- Offer flu shot to patient per office policy. Document in nursing note if clinic does not have flu shot in stock, if patient declines or if patient reports having this season's flu shot administered elsewhere (please note both date of admin and clinic/pharmacy pt reports provided vaccine). Internal Medicine  Follow Up Note  [employer] at [place] [address], [place], [facility], [address]  Phone [phone]; Fax [phone]    Date of Service:  2018  Patient's Name & : Cyndie Carmichael - 1965     Assessment/Plan/Orders:       Cyndie Carmichael is a 52 y.o. male who was seen today for follow up. The primary encounter diagnosis was Essential hypertension. Diagnoses of Mixed hyperlipidemia, Elevated CPK, Chronic bilateral low back pain without sciatica, Headache disorder, IGT (impaired glucose tolerance), and Influenza vaccination declined by patient were also pertinent to this visit.   Has been out of medication, missed appt for refills, blood pressure borderline high today  Resume lisinopril 30 mg and hydrochlorothiazide 12.5 mg daily   Chronic muscular low back pain, patient declines referral to PT today, advised pt to call if he changes his mind; Rx for lumbar brace provided at patient request, advised to limit use   Overdue for fasting labs  HA disorder managed by neurology   Body mass index is 30.61 kg/m². Ideal body weight: 176 lb 2.4 oz (79.9 kg)  Adjusted ideal body weight: 198 lb 7.8 oz (90 kg)   Discussed lifestyle modifications with focus on diet low in processed carbohydrates and regular exercise. Follow up weight/BMI at next visit. Patient Instructions     Continue your current medications    Limit use of back brace to no more than 8 hours a day    Call for a PT referral if your back pain worsens    Routine follow up with Leslie Chaves MD in 6 months  Call office 1-2 weeks prior to appointment to confirm if testing is required before appointment. Lab hours at [facility] are 7:30am-3pm Monday-Friday. Check in 15 minutes prior to your scheduled appointment time. Call and request an earlier appointment if needed to address any questions or concerns . TESTING RESULTS   Results will be released to [facility] or sent by [address] mail. If you have questions about your results, please schedule a follow up appointment to discuss with your PCP. MEDICATION REFILLS    -- Please allow at least 2 business days for refill requests to be addressed. Medication refills may be requested through your pharmacy. Refills will not be provided by the after hours/on call provider. The patient states understanding of recommended treatment plan.       Orders Placed This Encounter    AMB SUPPLY ORDER    CBC WITH AUTOMATED DIFF    METABOLIC PANEL, COMPREHENSIVE    HEMOGLOBIN A1C WITH EAG    LIPID PANEL    TSH AND FREE T4    URINALYSIS W/ RFLX MICROSCOPIC    rosuvastatin (CRESTOR) 20 mg tablet    lisinopril (PRINIVIL, ZESTRIL) 20 mg tablet    hydroCHLOROthiazide (HYDRODIURIL) 12.5 mg tablet    tiZANidine (ZANAFLEX) 4 mg tablet  lisinopril (PRINIVIL, ZESTRIL) 10 mg tablet    clotrimazole-betamethasone (LOTRISONE) topical cream       Bennett Lee MD - Internal Medicine  12/06/2018, 7:22 AM    HPI & ROS:     Chief Complaint   Patient presents with    Medication Refill         Son [place] presents for follow up. States he has been feeling well. Ran out of medications. Still gets low back pain bilaterally, knots in muscles (sometimes in upper back as well), no radiating pain, no weakness or numbness. Usually symptoms occur at night. Interested in a specific back brace he saw on TV,  lumbar compression brace with pump. Review of Systems   Constitutional: Negative for chills, diaphoresis, fever, malaise/fatigue and weight loss. Eyes: Negative for blurred vision, double vision, photophobia, pain, discharge and redness. Respiratory: Negative for cough, hemoptysis, sputum production, shortness of breath and wheezing. Cardiovascular: Negative for chest pain, palpitations, orthopnea, claudication, leg swelling and PND. Gastrointestinal: Negative for abdominal pain, blood in stool, constipation, diarrhea, heartburn, melena, nausea and vomiting. Musculoskeletal: Positive for back pain and myalgias. Negative for falls, joint pain and neck pain. Skin: Positive for itching and rash. Neurological: Negative for dizziness, tingling, tremors, sensory change, speech change, focal weakness, seizures, loss of consciousness, weakness and headaches.         Patient Active Problem List    Diagnosis    IGT (impaired glucose tolerance)     1/11/13:  HbA1C 6.2%  9/19/14:  HbA1C POC 5.9%  12/16/16:  HbA1C 6.3%       Influenza vaccination declined by patient    Headache disorder     Managed by Dr. Dominik Arroyo (neurology)      PUD (peptic ulcer disease)     No H Pylori on biopsy, multiple small ulcers on EGD (Dr. Mati Osman)      Elevated CPK     Elevated CPKs without elevation of CPKMB and without elevation of LFTs, normal JAMISON; doubtful there is any clinical significance      Mixed hyperlipidemia     12/26/17:  , , HDL 36,  -->10yCVD risk 10.7% --> start Rosuvastatin 20 mg       Essential hypertension     Lisinopril 30 mg daily (takes 20 mg + 10 mg), hydrochlorothiazide 12.5 mg daily       Chronic bilateral low back pain without sciatica     Hx of lumbar fusion   Bilateral SI joint TTP and \"knots\" overlying SIs  PRN Skelaxin, avoid NSAID use due to hx of PUD         Objective:     /90 (BP 1 Location: Left arm, BP Patient Position: Sitting) Comment: manual  Pulse 63   Temp 95.9 °F (35.5 °C) (Oral)   Resp 18   Ht 6' 1\" (1.854 m)   Wt 232 lb (105.2 kg)   SpO2 97%   BMI 30.61 kg/m²     Physical Exam   Constitutional: He is oriented to person, place, and time. He appears well-developed and well-nourished. No distress. HENT:   Head: Normocephalic and atraumatic. Nose: Nose normal.   Eyes: Conjunctivae and EOM are normal. Right eye exhibits no discharge. Left eye exhibits no discharge. No scleral icterus. Neck: Neck supple. No JVD present. Cardiovascular: Normal rate, regular rhythm, normal heart sounds and intact distal pulses. Exam reveals no gallop and no friction rub. No murmur heard. Pulmonary/Chest: Effort normal and breath sounds normal. No stridor. No respiratory distress. He has no wheezes. He has no rales. He exhibits no tenderness. Abdominal: Soft. Bowel sounds are normal.   Musculoskeletal: He exhibits no edema, tenderness or deformity. Back:    Mild muscular TTP bilateral thoracic paraspinals and bilateral SI joints, well healed midline scar lumbar spine   Neurological: He is alert and oriented to person, place, and time. He exhibits normal muscle tone. Coordination normal.   Skin: Skin is warm and dry. No rash noted. He is not diaphoretic. No erythema. No pallor. Small areas of scaly dry skin on lower back    Psychiatric: He has a normal mood and affect.  His behavior is normal. Judgment and thought content normal.        Additional History     Past Medical History:   Diagnosis Date    Allergic rhinitis due to pollen    [facility] Diverticulosis Jan 2016    Colonoscopy - Dr Carlton Riley hypertension     RISTOWEZ(082.6) 6/65/94    Helicobacter pylori gastritis 7/22/2015    Clarithromycin, Amoxil, and Omeprazole BID x 14 days prescribed    Hemorrhoid Jan 2016    Colonoscopy - Dr Ulis Crigler Herniated disc     per pt multiple levels    History of lumbar fusion 1/8/2016    Influenza vaccination declined by patient - 12/26/17 12/26/2017    Left leg pain 5/8/2015    Muscular soreness in posterior lower thigh and upper calf     No-show for appointment 04/26/2017 04/26/2017, 8/13/18    Noncompliance with treatment plan     Pancreatitis     PUD (peptic ulcer disease) Jan 2016    No H Pylori on biopsy, multiple small ulcers on EGD (Dr. Lauren Phillips)    Scleral icterus 7/9/2015     Past Surgical History:   Procedure Laterality Date    HX COLONOSCOPY N/A Jan 2016    Dr. Lauren Phillips - hemorrhoids and diverticulosis, 10 year interval    HX ENDOSCOPY N/A Jan 2016    Dr. Lauren Phillips - multiple small ulcers, neg H pylori    HX LUMBAR FUSION       Social History     Tobacco Use    Smoking status: Former Smoker     Types: Cigars    Smokeless tobacco: Never Used   Substance Use Topics    Alcohol use: Yes     Alcohol/week: 1.5 oz     Types: 3 drink(s) per week    Drug use: Yes     Comment: Marijuana     Current Outpatient Medications   Medication Sig    rosuvastatin (CRESTOR) 20 mg tablet take 1 tablet by mouth every evening    lisinopril (PRINIVIL, ZESTRIL) 20 mg tablet take 1 tablet by mouth once daily    hydroCHLOROthiazide (HYDRODIURIL) 12.5 mg tablet take 1 tablet by mouth every morning    tiZANidine (ZANAFLEX) 4 mg tablet Take 1 Tab by mouth every twelve (12) hours as needed.  lisinopril (PRINIVIL, ZESTRIL) 10 mg tablet Take 1 Tab by mouth daily.     clotrimazole-betamethasone (LOTRISONE) topical cream apply to affected area twice a day    amitriptyline (ELAVIL) 25 mg tablet Take 1 Tab by mouth nightly. Take amitriptyline about 1 hour before bedtime    Omeprazole delayed release (PRILOSEC D/R) 20 mg tablet Take 1 Tab by mouth two (2) times a day. Indications: HELICOBACTER PYLORI GASTRITIS    ondansetron (ZOFRAN ODT) 4 mg disintegrating tablet Take 1 Tab by mouth every eight (8) hours as needed for Nausea. No current facility-administered medications for this visit. No Known Allergies    Encounter Diagnoses:     Encounter Diagnoses     ICD-10-CM ICD-9-CM   1. Essential hypertension I10 401.9   2. Mixed hyperlipidemia E78.2 272.2   3. Elevated CPK R74.8 790.5   4. Chronic bilateral low back pain without sciatica M54.5 724.2    G89.29 338.29   5. Headache disorder R51 784.0   6. IGT (impaired glucose tolerance) R73.02 790.22   7. Influenza vaccination declined by patient Z28.21 V64.06            This document may have been created with the aid of dictation software.   Text may contain errors, particularly phonetic errors

## 2018-12-14 DIAGNOSIS — F07.81 POST CONCUSSIVE SYNDROME: ICD-10-CM

## 2018-12-14 DIAGNOSIS — G44.52 NEW DAILY PERSISTENT HEADACHE: ICD-10-CM

## 2018-12-14 NOTE — TELEPHONE ENCOUNTER
patient's wife is calling requesting a refill of lsinopril 10 mg. Patient is taking 30 mg a day. They do not make a 30 mg tab so he takes a 10 & a 20. He only has 5 left of the 10mg.   Future Appointments   Date Time Provider Rosalind Baca   6/6/2019 11:00 AM Gem Arciniega  Lonnie Chase, [address] [place]

## 2018-12-16 RX ORDER — AMITRIPTYLINE HYDROCHLORIDE 25 MG/1
TABLET, FILM COATED ORAL
Qty: 30 TAB | Refills: 5 | Status: SHIPPED | OUTPATIENT
Start: 2018-12-16 | End: 2019-06-26 | Stop reason: SDUPTHER

## 2018-12-16 RX ORDER — LISINOPRIL 10 MG/1
10 TABLET ORAL DAILY
Qty: 90 TAB | Refills: 1 | Status: SHIPPED | OUTPATIENT
Start: 2018-12-16 | End: 2019-05-14 | Stop reason: SDUPTHER

## 2019-02-04 RX ORDER — HYDROCHLOROTHIAZIDE 12.5 MG/1
TABLET ORAL
Qty: 30 TAB | Refills: 5 | Status: SHIPPED | OUTPATIENT
Start: 2019-02-04 | End: 2019-05-14 | Stop reason: SDUPTHER

## 2019-03-22 DIAGNOSIS — I10 ESSENTIAL HYPERTENSION: Chronic | ICD-10-CM

## 2019-03-26 RX ORDER — LISINOPRIL 20 MG/1
TABLET ORAL
Qty: 90 TAB | Refills: 1 | Status: SHIPPED | OUTPATIENT
Start: 2019-03-26 | End: 2019-05-14 | Stop reason: SDUPTHER

## 2019-03-26 RX ORDER — ROSUVASTATIN CALCIUM 20 MG/1
TABLET, COATED ORAL
Qty: 90 TAB | Refills: 1 | Status: SHIPPED | OUTPATIENT
Start: 2019-03-26 | End: 2019-05-14 | Stop reason: SDUPTHER

## 2019-05-01 ENCOUNTER — OFFICE VISIT (OUTPATIENT)
Dept: FAMILY MEDICINE CLINIC | Age: 54
End: 2019-05-01

## 2019-05-01 VITALS
HEART RATE: 84 BPM | DIASTOLIC BLOOD PRESSURE: 80 MMHG | HEIGHT: 73 IN | RESPIRATION RATE: 16 BRPM | OXYGEN SATURATION: 95 % | WEIGHT: 202.6 LBS | BODY MASS INDEX: 26.85 KG/M2 | SYSTOLIC BLOOD PRESSURE: 120 MMHG | TEMPERATURE: 96.4 F

## 2019-05-01 DIAGNOSIS — N34.1 NONGONOCOCCAL URETHRITIS: ICD-10-CM

## 2019-05-01 DIAGNOSIS — N34.1 URETHRITIS, NONSPECIFIC: Primary | ICD-10-CM

## 2019-05-01 DIAGNOSIS — I10 ESSENTIAL HYPERTENSION: Chronic | ICD-10-CM

## 2019-05-01 RX ORDER — AZITHROMYCIN 500 MG/1
1000 TABLET, FILM COATED ORAL ONCE
Qty: 2 TAB | Refills: 0 | Status: SHIPPED | OUTPATIENT
Start: 2019-05-01 | End: 2019-05-01

## 2019-05-01 NOTE — PATIENT INSTRUCTIONS
Stop taking ciprofloxacin. Take the prescribed single dose of azithromycin 1000 mg (two 500 mg tabs). Symptoms should resolve within 1-2 days. If not, come in for a repeat culture/swab to be collected.

## 2019-05-01 NOTE — PROGRESS NOTES
PRE-VISIT PLANNING:     PATIENT NAME:  Alejandro Caballero   :  1965   PCP:  Bella Sanchez MD     Future Appointments   Date Time Provider Rosalind Baca   2019 10:45 AM Bella Sanchez MD [facility]   2019 11:00 AM Bella Sanchez MD [facility] [employer] [place] is scheduled for an office visit with Karol Acosta MD on 2019 for same day appointment, seen in ER yesterday for penile discharge, meds not helping. Encounter opened prior to visit to complete pre-visit planning. Last office visit with PCP was 2018. Pending issues:  Health Maintenance Due   Topic Date Due    Shingrix Vaccine Age 49> (1 of 2) 2013    MEDICARE YEARLY EXAM  2018       Rooming Nurse:            Internal Medicine  Follow Up Emergency Department Visit  [address] [facility] at [place] [address] Flavia [address], [facility], [address] [place]  Phone [phone]; Fax [phone]    Date of Service:  2019  Patient's Name & : Alejandro Caballero - 1965     Assessment/Plan/Orders:       Alejandro Caballero is a 52 y.o. male who was seen today for follow up after emergency department visit. The primary encounter diagnosis was Urethritis, nonspecific. A diagnosis of Nongonococcal urethritis was also pertinent to this visit. Results of testing done in ER reviewed with patient and significant other. Nonspecific (not chlamydial) nongonoccal urethritis, treat with azithromycin 1 gm single dose. Stop ciprofloxacin. If symptoms persist, NAAT test urine sample (including initial voided volume) for GC/chlamydia, M genitalium and T vaginalis. Hypertension, improved on recheck, well-controlled on current regimen  The patient states understanding of recommended treatment plan. There are no Patient Instructions on file for this visit. No orders of the defined types were placed in this encounter.       Karol Acosta MD - Internal Medicine  05/01/2019, 9:35 AM    HPI & ROS:     Chief Complaint   Patient presents with   [facility] Follow Up      Nkechi Lopez presented today for follow up after being seen at [facility] Emergency Department on 4/28/19 for complaints of penile discharge and dysuria x 1 week. UA c/w infection. GC/chl testing negative. Urine culture prelim results with lactobacillus and probable candida. No improvement in symptoms with cipro (on day 4 of tx now)    CareEverywhere updated. Emergency department encounter notes, testing and medications administered and prescribed reviewed. PMHx, PSHx and Problem list were reviewed and updated in chart. Medication list reconciled and updated as below. Review of Systems   Constitutional: Negative for chills and fever. Genitourinary: Positive for dysuria. Negative for flank pain, frequency, hematuria and urgency. +Urethral discharge   Musculoskeletal: Negative for joint pain and myalgias. Skin: Negative for itching and rash. Additionally notes he has lost 30#s since he gave up drinking beer a few months ago. Objective:     BP (!) 127/91 (BP 1 Location: Left arm, BP Patient Position: Sitting)   Pulse 84   Temp 96.4 °F (35.8 °C) (Oral)   Resp 16   Ht 6' 1\" (1.854 m)   Wt 202 lb 9.6 oz (91.9 kg)   SpO2 95%   BMI 26.73 kg/m²     Physical Exam   Constitutional: He is oriented to person, place, and time. He appears well-developed and well-nourished. No distress. HENT:   Head: Normocephalic and atraumatic. Nose: Nose normal.   Eyes: Conjunctivae and EOM are normal. Right eye exhibits no discharge. Left eye exhibits no discharge. No scleral icterus. Neck: Neck supple. No JVD present. Cardiovascular: Normal rate, regular rhythm, normal heart sounds and intact distal pulses. Exam reveals no gallop and no friction rub. No murmur heard. Pulmonary/Chest: Effort normal and breath sounds normal. No stridor. No respiratory distress. He has no wheezes. He has no rales. He exhibits no tenderness. Abdominal: Soft. Bowel sounds are normal.   Musculoskeletal: He exhibits no edema, tenderness or deformity. Neurological: He is alert and oriented to person, place, and time. He exhibits normal muscle tone. Coordination normal.   Skin: Skin is warm and dry. No rash noted. He is not diaphoretic. No erythema. No pallor. Psychiatric: He has a normal mood and affect. His behavior is normal. Judgment and thought content normal.      Additional History   Patient's Past Med Hx, Surg Hx, Soc Hx, Family Hx reviewed. Current Outpatient Medications   Medication Sig    rosuvastatin (CRESTOR) 20 mg tablet take 1 tablet by mouth at bedtime    lisinopril (PRINIVIL, ZESTRIL) 20 mg tablet take 1 tablet by mouth once daily    hydroCHLOROthiazide (HYDRODIURIL) 12.5 mg tablet take 1 tablet by mouth every morning    amitriptyline (ELAVIL) 25 mg tablet take 1 tablet by mouth every evening TAKE ABOUT 1 HOUR BEFORE BEDTIME    lisinopril (PRINIVIL, ZESTRIL) 10 mg tablet Take 1 Tab by mouth daily.  tiZANidine (ZANAFLEX) 4 mg tablet Take 1 Tab by mouth every twelve (12) hours as needed.  clotrimazole-betamethasone (LOTRISONE) topical cream apply to affected area twice a day    Omeprazole delayed release (PRILOSEC D/R) 20 mg tablet Take 1 Tab by mouth two (2) times a day. Indications: HELICOBACTER PYLORI GASTRITIS    ondansetron (ZOFRAN ODT) 4 mg disintegrating tablet Take 1 Tab by mouth every eight (8) hours as needed for Nausea. No current facility-administered medications for this visit. No Known Allergies    Encounter Diagnoses:     Encounter Diagnoses     ICD-10-CM ICD-9-CM   1. Urethritis, nonspecific N34.1 099.40   2.  Nongonococcal urethritis N34.1 099.40

## 2019-05-01 NOTE — PROGRESS NOTES
Bunny Skinner is a 52 y.o. male (: 1965) presenting to address:    Chief Complaint   Patient presents with   [facility] Follow Up       Vitals:    19 1050   BP: (!) 127/91   Pulse: 84   Resp: 16   Temp: 96.4 °F (35.8 °C)   TempSrc: Oral   SpO2: 95%   Weight: 202 lb 9.6 oz (91.9 kg)   Height: 6' 1\" (1.854 m)   PainSc:   0 - No pain       Hearing/Vision:   No exam data present    Learning Assessment:     Learning Assessment 2015   PRIMARY LEARNER Patient   HIGHEST LEVEL OF EDUCATION - PRIMARY LEARNER  GRADUATED HIGH SCHOOL OR GED   BARRIERS PRIMARY LEARNER NONE   CO-LEARNER CAREGIVER No   PRIMARY LANGUAGE ENGLISH   LEARNER PREFERENCE PRIMARY OTHER (COMMENT)   ANSWERED BY patient   RELATIONSHIP SELF     Depression Screening:     3 most recent PHQ Screens 2019   Little interest or pleasure in doing things Not at all   Feeling down, depressed, irritable, or hopeless Not at all   Total Score PHQ 2 0     Fall Risk Assessment:     Fall Risk Assessment, last 12 mths 2016   Able to walk? Yes   Fall in past 12 months? No     Abuse Screening:     Abuse Screening Questionnaire 2016   Do you ever feel afraid of your partner? N   Are you in a relationship with someone who physically or mentally threatens you? N   Is it safe for you to go home? Y     Coordination of Care Questionaire:   1. Have you been to the ER, urgent care clinic since your last visit? Hospitalized since your last visit? YES 2019   2. Have you seen or consulted any other health care providers outside of the [address] [place] [address] since your last visit? Include any pap smears or colon screening. NO    Advanced Directive:   1. Do you have an Advanced Directive? NO    2. Would you like information on Advanced Directives?  YES

## 2019-05-14 DIAGNOSIS — I10 ESSENTIAL HYPERTENSION: Chronic | ICD-10-CM

## 2019-05-14 RX ORDER — LISINOPRIL 10 MG/1
10 TABLET ORAL DAILY
Qty: 90 TAB | Refills: 1 | Status: SHIPPED | OUTPATIENT
Start: 2019-05-14 | End: 2020-01-23 | Stop reason: SDUPTHER

## 2019-05-14 RX ORDER — TIZANIDINE 4 MG/1
4 TABLET ORAL
Qty: 90 TAB | Refills: 1 | Status: SHIPPED | OUTPATIENT
Start: 2019-05-14 | End: 2020-01-06 | Stop reason: SDUPTHER

## 2019-05-14 RX ORDER — LISINOPRIL 20 MG/1
TABLET ORAL
Qty: 90 TAB | Refills: 1 | Status: SHIPPED | OUTPATIENT
Start: 2019-05-14 | End: 2020-01-23 | Stop reason: SDUPTHER

## 2019-05-14 RX ORDER — ROSUVASTATIN CALCIUM 20 MG/1
TABLET, COATED ORAL
Qty: 90 TAB | Refills: 1 | Status: SHIPPED | OUTPATIENT
Start: 2019-05-14 | End: 2020-01-06 | Stop reason: SDUPTHER

## 2019-05-14 RX ORDER — HYDROCHLOROTHIAZIDE 12.5 MG/1
12.5 TABLET ORAL DAILY
Qty: 90 TAB | Refills: 1 | Status: SHIPPED | OUTPATIENT
Start: 2019-05-14 | End: 2020-01-23 | Stop reason: SDUPTHER

## 2019-05-14 NOTE — TELEPHONE ENCOUNTER
LAST SEEN 5/1/19    LAST FILLED AT LOCAL PHARMACY, now requesting [facility]    Crestor 3/26/19 qty 90 w/ 1 refill  Lisinopril 3/26/19 qty 90 w/ 1 refill  Tizanidine 12/6/18 90 w/ 1 refill  HCTZ 2/4/19 qty 30 w/ 5 refill

## 2019-05-16 ENCOUNTER — OFFICE VISIT (OUTPATIENT)
Dept: FAMILY MEDICINE CLINIC | Age: 54
End: 2019-05-16

## 2019-05-16 VITALS
BODY MASS INDEX: 26.83 KG/M2 | DIASTOLIC BLOOD PRESSURE: 80 MMHG | OXYGEN SATURATION: 98 % | HEART RATE: 61 BPM | SYSTOLIC BLOOD PRESSURE: 117 MMHG | RESPIRATION RATE: 14 BRPM | TEMPERATURE: 95.5 F | HEIGHT: 73 IN | WEIGHT: 202.4 LBS

## 2019-05-16 DIAGNOSIS — N34.1 URETHRITIS, NONGONOCOCCAL: Primary | ICD-10-CM

## 2019-05-16 DIAGNOSIS — R82.81 PYURIA: ICD-10-CM

## 2019-05-16 DIAGNOSIS — R36.0 PENILE DISCHARGE, WITHOUT BLOOD: ICD-10-CM

## 2019-05-16 RX ORDER — AMOXICILLIN 500 MG/1
500 CAPSULE ORAL 2 TIMES DAILY
Qty: 10 CAP | Refills: 0 | Status: SHIPPED | OUTPATIENT
Start: 2019-05-16 | End: 2019-05-21

## 2019-05-16 RX ORDER — ACETAMINOPHEN 325 MG/1
650 TABLET ORAL
COMMUNITY

## 2019-05-16 NOTE — PATIENT INSTRUCTIONS
Labs today     Referral to urology     Start amoxicillin 500 mg twice daily for 5 days while waiting for urology appointment.

## 2019-05-16 NOTE — PROGRESS NOTES
PRE-VISIT PLANNING:     PATIENT NAME:  Jemma Edwards   :  1965   PCP:  Lalla Burkitt, MD     Future Appointments   Date Time Provider Rosalind Baca   2019 11:15 AM Lalla Burkitt, MD [facility], [place], [facility]   2019 11:00 AM Lalla Burkitt, MD SUREKHA Betancourt is scheduled for an office visit with Libertad Ball MD on 2019 for follow up (pt requested appt), due for [address] if time allows. Encounter opened prior to visit to complete pre-visit planning. Last office visit with PCP was 2019. Pending issues:  -- Needs to complete labs, orders are in place from December     Health Maintenance Due   Topic Date Due    Shingrix Vaccine Age 50> (1 of 2) 2013    MEDICARE YEARLY EXAM  2018       Rooming Nurse:     -- [address] rooming protocol  -- ACP packet            Internal Medicine  Acute Care Visit  [address] [facility] [place] [address] [place], [place], [address] [facility]  Phone [phone]; Fax [phone]    Date of Service:  2019  Patient's Name & :   Jemma Edwards - 1965   Patient's PCP:  Lalla Burkitt, MD     SUBJECTIVE        Chief Complaint   Patient presents with    Penile Discharge     milky in color       Jemma Edwards is a 62 y.o. male who complains of persistent creamy urethral discharge for ~3 weeks. Notes discharge seems to be mostly associated with urination, though significant other notes seeing discharge on pt's underwear. Seen in ER 19, symptomatic x 1 wk at that time, testing for chlamydia and gonorrhea negative, urine culture with skin mohan (lactobacillus and candida), UA with  WBCs and large LE. Given cipro by ER, did not change symptoms. 1 gram azithromycin prescribed at [address] 19, resolved dysuria, but not discharge. Feels well. Review of Systems   Constitutional: Negative for chills, fever and malaise/fatigue.    Gastrointestinal: Negative for abdominal pain, constipation, diarrhea, nausea and vomiting. Genitourinary: Negative for dysuria, flank pain, frequency, hematuria and urgency. +Urethral discharge   Musculoskeletal: Positive for back pain (chronic MSK low back pain). Negative for joint pain and myalgias. Skin: Negative for itching and rash. ASSESSMENT & PLAN    The primary encounter diagnosis was Urethritis, nongonococcal. Diagnoses of Penile discharge, without blood and Pyuria were also pertinent to this visit. Negative testing for GC and chlamydia  No response to ciprofloxacin  Resolution of dysuria with azithromycin, but persistent penile discharge    PLAN: Repeat urine testing, NAAT for Chlamydia trachamoatis, Neisseria gonorrhea, Trichomonas vaginalis, Mycoplasma genitalium  Discussed risks/benefits of proceeding with antibiotic treatment for lactobacillus, though it would be an unusual cause of urethritis. Amoxicillin prescribed.    Referral to urology     Patient to complete fasting bloodwork for chronic issues today as ordered previously    Orders Placed This Encounter    MYCOPLASMA GENITALIUM, SHERRI, URINE     Standing Status:   Future     Standing Expiration Date:   5/16/2020    CULTURE, URINE     Standing Status:   Future     Standing Expiration Date:   5/16/2020    CT/NG/T.VAGINALIS AMPLIFICATION     Standing Status:   Future     Standing Expiration Date:   5/16/2020     Order Specific Question:   Specimen source     Answer:   Urine [258]    CHLAMYDIA/NEISSERIA AMPLIFICATION     Standing Status:   Future     Standing Expiration Date:   5/16/2020     Order Specific Question:   Specimen type/source     Answer:   Urine [258]    URINALYSIS W/ RFLX MICROSCOPIC     Standing Status:   Future     Standing Expiration Date:   5/16/2020    REFERRAL TO UROLOGY     Referral Priority:   Routine     Referral Type:   Consultation     Referral Reason:   Specialty Services Required     Referred to Provider:   Nuno Mckinnon MD Requested Specialty:   Urology     Number of Visits Requested:   1    acetaminophen (TYLENOL) 325 mg tablet     Sig: Take 650 mg by mouth every four (4) hours as needed for Pain.  amoxicillin (AMOXIL) 500 mg capsule     Sig: Take 1 Cap by mouth two (2) times a day for 5 days. Dispense:  10 Cap     Refill:  0       Korina Rodriguez MD   05/16/2019 11:29 AM     OBJECTIVE    /80 (BP 1 Location: Right arm, BP Patient Position: Sitting)   Pulse 61   Temp 95.5 °F (35.3 °C) (Oral)   Resp 14   Ht 6' 1\" (1.854 m)   Wt 202 lb 6.4 oz (91.8 kg)   SpO2 98%   BMI 26.70 kg/m²     Vital signs and nursing notes reviewed  General:  Age appropriate, appears well, in no apparent distress. Abdomen:  Soft, non-tender. Bowel sounds normal. No masses,  no organomegaly. No costovertebral tenderness or inguinal adenopathy noted. Additional History   Patient's Past Med Hx, Surg Hx, Soc Hx, Family Hx reviewed. Current Outpatient Medications   Medication Sig    acetaminophen (TYLENOL) 325 mg tablet Take 650 mg by mouth every four (4) hours as needed for Pain.  amoxicillin (AMOXIL) 500 mg capsule Take 1 Cap by mouth two (2) times a day for 5 days.  tiZANidine (ZANAFLEX) 4 mg tablet Take 1 Tab by mouth every twelve (12) hours as needed (back pain, muscle spasm). Take 1 Tab by mouth every twelve (12) hours as needed. Indications: muscle spasm, back pain    rosuvastatin (CRESTOR) 20 mg tablet take 1 tablet by mouth at bedtime    hydroCHLOROthiazide (HYDRODIURIL) 12.5 mg tablet Take 1 Tab by mouth daily.  lisinopril (PRINIVIL, ZESTRIL) 10 mg tablet Take 1 Tab by mouth daily.     lisinopril (PRINIVIL, ZESTRIL) 20 mg tablet take 1 tablet by mouth once daily (total daily dose 30 mg)    amitriptyline (ELAVIL) 25 mg tablet take 1 tablet by mouth every evening TAKE ABOUT 1 HOUR BEFORE BEDTIME    clotrimazole-betamethasone (LOTRISONE) topical cream apply to affected area twice a day    ondansetron [facility] ODT) 4 mg disintegrating tablet Take 1 Tab by mouth every eight (8) hours as needed for Nausea.  Omeprazole delayed release (PRILOSEC D/R) 20 mg tablet Take 1 Tab by mouth two (2) times a day. Indications: HELICOBACTER PYLORI GASTRITIS     No current facility-administered medications for this visit. No Known Allergies    Encounter Diagnoses:     Encounter Diagnoses     ICD-10-CM ICD-9-CM   1. Urethritis, nongonococcal N34.1 099.40   2. Penile discharge, without blood R36.9 788.7   3.  Pyuria N39.0 791.9

## 2019-05-16 NOTE — PROGRESS NOTES
Venu Fisher is a 62 y.o. male (: 1965) presenting to address:    Chief Complaint   Patient presents with    Penile Discharge     milky in color       Vitals:    19 1119   BP: 117/80   Pulse: 61   Resp: 14   Temp: 95.5 °F (35.3 °C)   TempSrc: Oral   SpO2: 98%   Weight: 202 lb 6.4 oz (91.8 kg)   Height: 6' 1\" (1.854 m)   PainSc:   7   PainLoc: Back       Hearing/Vision:   No exam data present    Learning Assessment:     Learning Assessment 2015   PRIMARY LEARNER Patient   HIGHEST LEVEL OF EDUCATION - PRIMARY LEARNER  GRADUATED HIGH SCHOOL OR GED   BARRIERS PRIMARY LEARNER NONE   CO-LEARNER CAREGIVER No   PRIMARY LANGUAGE ENGLISH   LEARNER PREFERENCE PRIMARY OTHER (COMMENT)   ANSWERED BY patient   RELATIONSHIP SELF     Depression Screening:     3 most recent PHQ Screens 2019   Little interest or pleasure in doing things Not at all   Feeling down, depressed, irritable, or hopeless Not at all   Total Score PHQ 2 0     Fall Risk Assessment:     Fall Risk Assessment, last 12 mths 2019   Able to walk? Yes   Fall in past 12 months? No     Abuse Screening:     Abuse Screening Questionnaire 2019   Do you ever feel afraid of your partner? N   Are you in a relationship with someone who physically or mentally threatens you? N   Is it safe for you to go home? Y     Coordination of Care Questionaire:   1. Have you been to the ER, urgent care clinic since your last visit? Hospitalized since your last visit? NO    2. Have you seen or consulted any other health care providers outside of the [address] [place] [address] since your last visit? Include any pap smears or colon screening. NO    Advanced Directive:   1. Do you have an Advanced Directive? NO    2. Would you like information on Advanced Directives?  NO

## 2019-05-21 LAB
A-G RATIO,AGRAT: 1.7 RATIO (ref 1.1–2.6)
ABSOLUTE LYMPHOCYTE COUNT, 10803: 2.7 K/UL (ref 1–4.8)
ALBUMIN SERPL-MCNC: 5 G/DL (ref 3.5–5)
ALP SERPL-CCNC: 79 U/L (ref 25–115)
ALT SERPL-CCNC: 28 U/L (ref 5–40)
ANION GAP SERPL CALC-SCNC: 17 MMOL/L
AST SERPL W P-5'-P-CCNC: 23 U/L (ref 10–37)
AVG GLU, 10930: 328 MG/DL (ref 91–123)
BASOPHILS # BLD: 0 K/UL (ref 0–0.2)
BASOPHILS NFR BLD: 0 % (ref 0–2)
BILIRUB SERPL-MCNC: 0.5 MG/DL (ref 0.2–1.2)
BILIRUB UR QL: NEGATIVE
BUN SERPL-MCNC: 10 MG/DL (ref 6–22)
CALCIUM SERPL-MCNC: 10 MG/DL (ref 8.4–10.4)
CHLAMYDIA AMPLIFIED URINE: NEGATIVE
CHLORIDE SERPL-SCNC: 97 MMOL/L (ref 98–110)
CHOLEST SERPL-MCNC: 120 MG/DL (ref 110–200)
CO2 SERPL-SCNC: 24 MMOL/L (ref 20–32)
COMMENT, 180044: NORMAL
CREAT SERPL-MCNC: 1 MG/DL (ref 0.5–1.2)
EOSINOPHIL # BLD: 0.3 K/UL (ref 0–0.5)
EOSINOPHIL NFR BLD: 3 % (ref 0–6)
EPITHELIAL,EPSU: ABNORMAL /HPF (ref 0–2)
ERYTHROCYTE [DISTWIDTH] IN BLOOD BY AUTOMATED COUNT: 14.8 % (ref 10–15.5)
GC AMPLIFIED URINE,919: NEGATIVE
GFRAA, 66117: >60
GFRNA, 66118: >60
GLOBULIN,GLOB: 3 G/DL (ref 2–4)
GLUCOSE SERPL-MCNC: 306 MG/DL (ref 70–99)
GLUCOSE UR QL: ABNORMAL MG/DL
GRANULOCYTES,GRANS: 56 % (ref 40–75)
HBA1C MFR BLD HPLC: 13 % (ref 4.8–5.9)
HCT VFR BLD AUTO: 47.1 % (ref 39.3–51.6)
HDLC SERPL-MCNC: 3.8 MG/DL (ref 0–5)
HDLC SERPL-MCNC: 32 MG/DL (ref 40–59)
HGB BLD-MCNC: 14.8 G/DL (ref 13.1–17.2)
HGB UR QL STRIP: ABNORMAL
KETONES UR QL STRIP.AUTO: ABNORMAL MG/DL
LDLC SERPL CALC-MCNC: 39 MG/DL (ref 50–99)
LEUKOCYTE ESTERASE: ABNORMAL
LYMPHOCYTES, LYMLT: 35 % (ref 20–45)
Lab: NEGATIVE
MCH RBC QN AUTO: 29 PG (ref 26–34)
MCHC RBC AUTO-ENTMCNC: 31 G/DL (ref 31–36)
MCV RBC AUTO: 92 FL (ref 80–95)
MONOCYTES # BLD: 0.5 K/UL (ref 0.1–1)
MONOCYTES NFR BLD: 6 % (ref 3–12)
NEUTROPHILS # BLD AUTO: 4.3 K/UL (ref 1.8–7.7)
NITRITE UR QL STRIP.AUTO: NEGATIVE
PH UR STRIP: 5 PH (ref 5–8)
PLATELET # BLD AUTO: 279 K/UL (ref 140–440)
PMV BLD AUTO: 11.3 FL (ref 9–13)
POTASSIUM SERPL-SCNC: 3.9 MMOL/L (ref 3.5–5.5)
PROT SERPL-MCNC: 8 G/DL (ref 6.4–8.3)
PROT UR QL STRIP: NEGATIVE MG/DL
RBC # BLD AUTO: 5.12 M/UL (ref 3.8–5.8)
RBC #/AREA URNS HPF: ABNORMAL /HPF
RESULT: ABNORMAL
SODIUM SERPL-SCNC: 138 MMOL/L (ref 133–145)
SOURCE OF URINE, 17028: ABNORMAL
SP GR UR: 1.04 (ref 1–1.03)
T4 FREE SERPL-MCNC: 1.2 NG/DL (ref 0.9–1.8)
TRIGL SERPL-MCNC: 244 MG/DL (ref 40–149)
TSH SERPL DL<=0.005 MIU/L-ACNC: 3.02 MCU/ML (ref 0.27–4.2)
UROBILINOGEN UR STRIP-MCNC: <2 MG/DL
VLDLC SERPL CALC-MCNC: 49 MG/DL (ref 8–30)
WBC # BLD AUTO: 7.8 K/UL (ref 4–11)
WBC URNS QL MICRO: ABNORMAL /HPF (ref 0–2)
YEAST,YEAST: ABNORMAL

## 2019-05-29 DIAGNOSIS — E11.65 UNCONTROLLED TYPE 2 DIABETES MELLITUS WITH HYPERGLYCEMIA (HCC): Primary | ICD-10-CM

## 2019-05-29 DIAGNOSIS — I10 ESSENTIAL HYPERTENSION: ICD-10-CM

## 2019-05-29 DIAGNOSIS — E78.2 MIXED HYPERLIPIDEMIA: ICD-10-CM

## 2019-05-29 RX ORDER — METFORMIN HYDROCHLORIDE 750 MG/1
750 TABLET, EXTENDED RELEASE ORAL DAILY
Qty: 90 TAB | Refills: 0 | Status: SHIPPED | OUTPATIENT
Start: 2019-05-29 | End: 2019-07-24 | Stop reason: SDUPTHER

## 2019-05-29 NOTE — PROGRESS NOTES
Call patient - labs show progression from prediabetes to uncontrolled diabetes with very high blood sugar levels (average blood sugar is 328, should be <120, HbA1C is 13%). Testing for chlamydia, gonorrhea and mycoplasma negative again. He will need to schedule an appt (probably with a new provider as I'm only in the office for another week) to discuss diabetes management to avoid serious health complications. I am placing a referral for diabetic education and ordering metformin ER once daily.

## 2019-06-06 ENCOUNTER — OFFICE VISIT (OUTPATIENT)
Dept: FAMILY MEDICINE CLINIC | Age: 54
End: 2019-06-06

## 2019-06-06 VITALS
SYSTOLIC BLOOD PRESSURE: 106 MMHG | HEART RATE: 63 BPM | TEMPERATURE: 95.8 F | BODY MASS INDEX: 27.62 KG/M2 | WEIGHT: 208.4 LBS | RESPIRATION RATE: 14 BRPM | OXYGEN SATURATION: 98 % | DIASTOLIC BLOOD PRESSURE: 71 MMHG | HEIGHT: 73 IN

## 2019-06-06 DIAGNOSIS — Z13.1 SCREENING FOR DIABETES MELLITUS: ICD-10-CM

## 2019-06-06 DIAGNOSIS — I10 ESSENTIAL HYPERTENSION: Chronic | ICD-10-CM

## 2019-06-06 DIAGNOSIS — G89.29 CHRONIC BILATERAL LOW BACK PAIN WITHOUT SCIATICA: ICD-10-CM

## 2019-06-06 DIAGNOSIS — E11.65 UNCONTROLLED TYPE 2 DIABETES MELLITUS WITH HYPERGLYCEMIA (HCC): Primary | Chronic | ICD-10-CM

## 2019-06-06 DIAGNOSIS — M54.50 CHRONIC BILATERAL LOW BACK PAIN WITHOUT SCIATICA: ICD-10-CM

## 2019-06-06 DIAGNOSIS — R73.09 ELEVATED GLUCOSE: ICD-10-CM

## 2019-06-06 DIAGNOSIS — E78.2 MIXED HYPERLIPIDEMIA: Chronic | ICD-10-CM

## 2019-06-06 DIAGNOSIS — D17.1 LIPOMA OF BACK: ICD-10-CM

## 2019-06-06 DIAGNOSIS — R73.09 ELEVATED HEMOGLOBIN A1C: ICD-10-CM

## 2019-06-06 RX ORDER — LANCETS
EACH MISCELLANEOUS
Qty: 50 EACH | Refills: 11 | Status: SHIPPED | OUTPATIENT
Start: 2019-06-06 | End: 2020-01-23 | Stop reason: ALTCHOICE

## 2019-06-06 RX ORDER — LANCING DEVICE/LANCETS
KIT MISCELLANEOUS
Qty: 1 KIT | Refills: 0 | Status: SHIPPED | OUTPATIENT
Start: 2019-06-06 | End: 2020-06-04 | Stop reason: SDUPTHER

## 2019-06-06 RX ORDER — BLOOD-GLUCOSE METER
EACH MISCELLANEOUS
Qty: 1 EACH | Refills: 0 | Status: SHIPPED | OUTPATIENT
Start: 2019-06-06 | End: 2020-01-29 | Stop reason: SDUPTHER

## 2019-06-06 NOTE — PATIENT INSTRUCTIONS
Initial blood sugar testing goals:    Fasting (6 hours or more) blood sugars between   2 hours after eating, blood sugars between 120-180

## 2019-06-06 NOTE — PROGRESS NOTES
Eda Zayas is a 62 y.o. male (: 1965) presenting to address:    Chief Complaint   Patient presents with    Diabetes     Patient DECLINED Pneumo 23 vaccine. Vitals:    19 1116   BP: 106/71   Pulse: 63   Resp: 14   Temp: 95.8 °F (35.4 °C)   TempSrc: Oral   SpO2: 98%   Weight: 208 lb 6.4 oz (94.5 kg)   Height: 6' 1\" (1.854 m)   PainSc:   7   PainLoc: Back       Hearing/Vision:   No exam data present    Learning Assessment:     Learning Assessment 2015   PRIMARY LEARNER Patient   HIGHEST LEVEL OF EDUCATION - PRIMARY LEARNER  GRADUATED HIGH SCHOOL OR GED   BARRIERS PRIMARY LEARNER NONE   CO-LEARNER CAREGIVER No   PRIMARY LANGUAGE ENGLISH   LEARNER PREFERENCE PRIMARY OTHER (COMMENT)   ANSWERED BY patient   RELATIONSHIP SELF     Depression Screening:     3 most recent PHQ Screens 2019   Little interest or pleasure in doing things Not at all   Feeling down, depressed, irritable, or hopeless Not at all   Total Score PHQ 2 0     Fall Risk Assessment:     Fall Risk Assessment, last 12 mths 2019   Able to walk? Yes   Fall in past 12 months? No     Abuse Screening:     Abuse Screening Questionnaire 2019   Do you ever feel afraid of your partner? N   Are you in a relationship with someone who physically or mentally threatens you? N   Is it safe for you to go home? Y     Coordination of Care Questionaire:   1. Have you been to the ER, urgent care clinic since your last visit? Hospitalized since your last visit? [address] [facility] 19 back pain    2. Have you seen or consulted any other health care providers outside of the [address] [place] [address] since your last visit? Include any pap smears or colon screening. NO    Advanced Directive:   1. Do you have an Advanced Directive? NO    2. Would you like information on Advanced Directives?  YES

## 2019-06-06 NOTE — PROGRESS NOTES
PRE-VISIT PLANNING:     PATIENT NAME:  Juan Carlos Moody   :  1965   PCP:  David Acevedo MD     Future Appointments   Date Time Provider Rosalind Jaquezi   2019 11:00 AM David Acevedo MD [facility]   6/10/2019 10:00 AM Omer Rosado [facility]   2019  1:45 PM Gustavo Chairez MD [address] [facility]          Juan Carlos Moody is scheduled for an office visit with Hermelinda Lott MD on 2019 for follow up. Encounter opened prior to visit to complete pre-visit planning. Last office visit with PCP was 2019. Pending issues:  -- Labs 19 with HbA1C 13%,   Health Maintenance Due   Topic Date Due    Pneumococcal 0-64 years (1 of 1 - PPSV23) 1969    FOOT EXAM Q1  1973    MICROALBUMIN Q1  1973    EYE EXAM RETINAL OR DILATED  1973    Shingrix Vaccine Age 50> (1 of 2) 2013    MEDICARE YEARLY EXAM  2018       Rooming Nurse:     -- POC BG   -- Instruct patient to remove socks and shoes prior to provider entering room if they are willing to have diabetic foot exam   -- Instruct patient to remove socks and shoes prior to provider entering room if they are willing to have diabetic foot exam   -- Pneumovax 23 due          Internal Medicine  Follow Up Visit  [address] [facility] [place]  [facility], [facility], [place], [address] [facility] [address]  Phone [phone]; Fax [phone]    Date of Service:  2019  Patient's Name & :   Juan Carlos Moody - 1965   Patient's PCP:  David Acevedo MD     SUBJECTIVE        Chief Complaint   Patient presents with    Diabetes       Juan Carlos Moody is a 62 y.o. male presenting for follow up new diagnosis of DM2. BG in office today is 236. Recent labs with progression from IGT to diabetes in spite of significant weight loss in past year (37#s). Metformin XR ordered, taking lisinopril and rosuvastatin.     Not familiar with diabetic diet, not familiar with home BG monitoring. Here today with his significant other. Reports feeling much better since starting metformin. Had been dropping weight without really trying, thinks he wasn't eating b/c of feeling overall unwell. Appetite is better on metformin. No side effects. Does have chronic back pain, noting lumps on lower back. Tender, but feels pain may be below lumps in his back, rather than in the lumps themselves. Has concerns about hx of matt placement for scoliosis in spine. Xrays done at [facility] 5/30/19. Review of Systems   Constitutional: Negative for chills, fever and malaise/fatigue. Gastrointestinal: Negative for abdominal pain, constipation, diarrhea, nausea and vomiting. Genitourinary: Negative for dysuria, flank pain, frequency, hematuria and urgency. +Urethral discharge   Musculoskeletal: Positive for back pain (chronic MSK low back pain). Negative for joint pain and myalgias. Skin: Negative for itching and rash. ASSESSMENT & PLAN    The primary encounter diagnosis was Uncontrolled type 2 diabetes mellitus with hyperglycemia ([address] Diagnoses of Mixed hyperlipidemia, Essential hypertension, Elevated glucose, Elevated hemoglobin A1c, Screening for diabetes mellitus, Chronic bilateral low back pain without sciatica, and Lipoma of back were also pertinent to this visit. Reviewed recommendations for management of diabetes including:  DSME and nutrition consultation  BG monitoring supplies to be picked up prior to attending DSME, then plan for at home monitoring daily and PRN   Continue rosuvastatin and lisinopril   Continue metformin  mg daily  Initial diabetic eye exam NEC  Annual urine screening   Daily self foot checks  Focus on lifestyle changes with emphasis on diet low in processed carbs   Reviewed Xray reports from 5/30/19 at [facility] with patient, some progression of DDD, nothing acute, matt is in place without signs of loosening/dysfunction.   Discussed likely dx of lipoma (lump on left lower back) and option for removal by surgery, patient declines referral for now. Body mass index is 27.5 kg/m². D/w patient that I will be leaving practice. Patient instructed to plan to establish with a new primary care provider within 2-3 months    Orders Placed This Encounter    S[address]li [address]    AMB POC GLUCOSE TEST    Blood-Glucose Meter (ACCU-CHEK TRISTA CONNECT METER) misc    glucose blood VI test strips (ACCU-CHEK TRISTA) strip    lancets (ACCU-CHEK MULTICLIX LANCET) misc    Lancing Device with Lancets (Brayan Horse) kit       Meliza Barrientos MD   6/11/2019 8:45 PM     OBJECTIVE    /71 (BP 1 Location: Right arm, BP Patient Position: Sitting)   Pulse 63   Temp 95.8 °F (35.4 °C) (Oral)   Resp 14   Ht 6' 1\" (1.854 m)   Wt 208 lb 6.4 oz (94.5 kg)   SpO2 98%   BMI 27.50 kg/m²     Physical Exam   Constitutional: He is oriented to person, place, and time. He appears well-developed and well-nourished. No distress. HENT:   Head: Normocephalic and atraumatic. Nose: Nose normal.   Eyes: Conjunctivae and EOM are normal. Right eye exhibits no discharge. Left eye exhibits no discharge. No scleral icterus. Neck: Neck supple. No JVD present. Cardiovascular: Normal rate, regular rhythm, normal heart sounds and intact distal pulses. Exam reveals no gallop and no friction rub. No murmur heard. Pulmonary/Chest: Effort normal and breath sounds normal. No stridor. No respiratory distress. He has no wheezes. He has no rales. He exhibits no tenderness. Abdominal: Soft. Bowel sounds are normal.   Musculoskeletal: He exhibits no edema, tenderness or deformity. Arms:  Spine with well-healed midline surgical incision c/w previous back surgery, no palpable stepoffs or deformities, no focal TTP over spinous processes   Neurological: He is alert and oriented to person, place, and time. He exhibits normal muscle tone. Coordination normal.   Skin: Skin is warm and dry. No rash noted. He is not diaphoretic. No erythema. No pallor. Psychiatric: He has a normal mood and affect. His behavior is normal. Judgment and thought content normal.   Nursing note and vitals reviewed. Diabetic foot exam:     Left Foot:   Visual Exam: normal    Pulse DP: 2+ (normal)   Filament test: 6/6       Right Foot:   Visual Exam: normal    Pulse DP: 2+ (normal)   Filament test: 6/6        Additional History   Patient's Past Med Hx, Surg Hx, Soc Hx, Family Hx reviewed and updated. Current Outpatient Medications   Medication Sig    Blood-Glucose Meter (ACCU-CHEK TRISTA CONNECT METER) misc E11.65 - BG testing daily and PRN    glucose blood VI test strips (ACCU-CHEK TRISTA) strip E11.65 - BG testing daily and PRN    lancets (ACCU-CHEK MULTICLIX LANCET) misc E11.65 - BG testing daily and PRN    Lancing Device with Lancets (ACCU-CHEK MULTICLIX LANCET) kit E11.65 - BG testing daily and PRN    metFORMIN ER (GLUCOPHAGE XR) 750 mg tablet Take 1 Tab by mouth daily.  acetaminophen (TYLENOL) 325 mg tablet Take 650 mg by mouth every four (4) hours as needed for Pain.  tiZANidine (ZANAFLEX) 4 mg tablet Take 1 Tab by mouth every twelve (12) hours as needed (back pain, muscle spasm). Take 1 Tab by mouth every twelve (12) hours as needed. Indications: muscle spasm, back pain    rosuvastatin (CRESTOR) 20 mg tablet take 1 tablet by mouth at bedtime    hydroCHLOROthiazide (HYDRODIURIL) 12.5 mg tablet Take 1 Tab by mouth daily.  lisinopril (PRINIVIL, ZESTRIL) 10 mg tablet Take 1 Tab by mouth daily.     lisinopril (PRINIVIL, ZESTRIL) 20 mg tablet take 1 tablet by mouth once daily (total daily dose 30 mg)    amitriptyline (ELAVIL) 25 mg tablet take 1 tablet by mouth every evening TAKE ABOUT 1 HOUR BEFORE BEDTIME    clotrimazole-betamethasone (LOTRISONE) topical cream apply to affected area twice a day    Omeprazole delayed release (PRILOSEC D/R) 20 mg tablet Take 1 Tab by mouth two (2) times a day. Indications: HELICOBACTER PYLORI GASTRITIS    ondansetron (ZOFRAN ODT) 4 mg disintegrating tablet Take 1 Tab by mouth every eight (8) hours as needed for Nausea. No current facility-administered medications for this visit. No Known Allergies    Encounter Diagnoses:     Encounter Diagnoses     ICD-10-CM ICD-9-CM   1. Uncontrolled type 2 diabetes mellitus with hyperglycemia (HCC) E11.65 250.02   2. Mixed hyperlipidemia E78.2 272.2   3. Essential hypertension I10 401.9   4. Elevated glucose R73.09 790.29   5. Elevated hemoglobin A1c R73.09 790.29   6. Screening for diabetes mellitus Z13.1 V77.1   7. Chronic bilateral low back pain without sciatica M54.5 724.2    G89.29 338.29   8.  Lipoma of back D17.1 214.8

## 2019-06-06 NOTE — Clinical Note
Can you make sure the POC BG has been entered? I remember it wasn't great, something in the lower 200s I think . ..  If you aren't sure what it was I'll just cancel the order

## 2019-06-10 ENCOUNTER — HOSPITAL ENCOUNTER (OUTPATIENT)
Dept: NUTRITION | Age: 54
Discharge: HOME | End: 2019-06-10
Payer: MEDICARE

## 2019-06-10 PROCEDURE — 97802 MEDICAL NUTRITION INDIV IN: CPT

## 2019-06-10 NOTE — PROGRESS NOTES
[address], [address] [facility], [address] [place] [address]  Phone: [phone]  Fax: [phone]   Nutrition Assessment - Medical Nutrition Therapy   Outpatient Initial Evaluation         Patient Name: Jose Luis Carlos : 1965   Treatment Diagnosis: DM2   Referral Source: Deena Anderson MD Start of [facility]): 6/10/2019     Gender: male Age: 62 y.o. Ht: 73 In Wt: 209.4 lb     BMI: 27.6 BMR   Male 1830 BMR Female    Anthropometrics Assessment: overweight     Past Medical History includes: metfomin     Pertinent Medications:     Metformin, crestor, lisinopril,    Biochemical Data:   Lab Results   Component Value Date/Time    Hemoglobin A1c 13.0 (H) 2019 11:52 AM    Hemoglobin A1c (POC) 5.9 2014 10:37 AM     Lab Results   Component Value Date/Time    Cholesterol, total 120 2019 11:52 AM    HDL Cholesterol 32 (L) 2019 11:52 AM    LDL, calculated 39 (L) 2019 11:52 AM    VLDL, calculated 49 (H) 2019 11:52 AM    Triglyceride 244 (H) 2019 11:52 AM     Lab Results   Component Value Date/Time    ALT (SGPT) 28 2019 11:52 AM    AST (SGOT) 23 2019 11:52 AM    Alk. phosphatase 79 2019 11:52 AM    Bilirubin, direct 0.14 2015 12:05 PM    Bilirubin, total 0.5 2019 11:52 AM     Lab Results   Component Value Date/Time    Creatinine 1.0 2019 11:52 AM     Lab Results   Component Value Date/Time    BUN 10 2019 11:52 AM     No results found for: MCACR, MCA1, MCA2, MCA3, MCAU, MCAU2, MCALPOCT     Subjective/Assessment:   Patient seen today in nutrition for initial assessment for diabetes MNT. Patient reports diagnosis of diabetes about 2 weeks ago. He reports making changes to his diet since then by lowering intake of starches. He reports all of his family has diabetes. He reports since the beginning of May his weight has decreased from 238 down to 202.  He is now gaining weight back and would like to maintain where he is now. Nutrition Diagnosis: Food and nutrition related knowledge deficit related to carbohydrate and meal planning for DM type 2 as evidenced by no previous education provided on DM diet. Current Eating Patterns: Diet recall:  Breakfast: 2-3 eggs with oranges; eggs with sausage; cereal with almond milk  Lunch: normally skips; sandwich with meat and cheese  Dinner: grilled chicken, stopped intake of fried chicken, salad, jose luis greens. Stopped intake of potato, rice, pasta and beans  Snack: SF pudding, boiled egg, meat and cheese rollup, viena sausage    Reports he has decreased/stopped his intake of starches: rice, potato, breads, soda, juice and beer. Fluids: he stopped juice and regular soda. Now he drinks water, and crystal light. Alcohol: normally 1-2 beers per week  No allergies or intolerances. Supplements: MVI most days  Reports no exercise. Estimate Needs   Calories:  2380 Protein: 95 Carbs: <90     Kcal/day  g/day  g/day      Education provided:   Intervention:  Education, Comprehensive - reviewed prescription below. Nutrition education provided on medical nutrition therapy for type 2 Diabetes Mellitus. Educated patient on pathophysiology of Type II diabetes and the rational for dietary modifications to include more fiber, protein and modified carbohydrate consumption. Provided education on food label reading, food groups (carbohydrates, proteins and non-starchy vegetables), and meal planning for diabetes control. Individual carbohydrate goals were assessed and provided to the patient. We discussed the importance of having a protein source with each meal and snack to help with blood sugar control and hunger. Meal and snack times were discussed. Encouraged patient to eat within 2 hours of waking and not go longer than 4-5 hours between meals. Reviewed sample meal plan, tips for eating out and grocery shopping. The patient was provided with my contact info. Patient asked questions and indicated understanding. Handouts Provided: [x]  Carbohydrates  [x]  Protein  [x]  Fiber  [x]  Serving Sizes  [x]  Meal Ideas  [x]  Non-starchy Vegetables []  Diabetes  []  Cholesterol  []  Sodium  [x]  Food labels  [x]  Snacks  []  Others:   Information Reviewed with: Patient and wife   Readiness to Change Stage: []  Pre-contemplative    []  Contemplative  [x]  Preparation               []  Action                  []  Maintenance   Potential Barriers to Learning: []  Decline in memory    []  Language barrier   []  Other:  []  Emotional                  []  Limited mobility  []  Lack of motivation     [] Vision, hearing or cognitive impairment   Expected Compliance: Good      Nutritional Goal - To promote lifestyle changes to result in:    []  Weight loss  [x]  Improved diabetic control  []  Decreased cholesterol levels  []  Decreased blood pressure  []  Weight maintenance []  Preventing any interactions associated with food allergies  []  Adequate weight gain toward goal weight  []  Other:        Nutrition Prescription/  Patient Goals:    Limit intake of carbohydrates to no more than.   o 90 grams of carbohydrate per day. o 30 grams of carbohydrate per meal/snack.  Have 3 meals per day and 1-2 snacks (as needed).  Aim for at least 3 ounces (deck of cards) of a protein choice at every meal and 1-2 ounces at snack times.  Choose one or more vegetables to include with lunch, dinner and snacks (as needed).  Choose grains with >3 grams of Dietary Fiber per serving.  Have at least 64 ounces of fluids per day. Choose sugar-free beverages only.  Keep condiments/dressing/sauces at 2 grams of net carb or           less.        Dietitian Signature: Kulwinder Starr MS, RD Date: 6/10/2019   Follow-up: July 10th at 10:00 Time: 10:04 AM

## 2019-06-11 LAB — GLUCOSE DOSE-GTT, POCT, GLDSPOCT: 236

## 2019-06-26 DIAGNOSIS — G44.52 NEW DAILY PERSISTENT HEADACHE: ICD-10-CM

## 2019-06-26 DIAGNOSIS — F07.81 POST CONCUSSIVE SYNDROME: ICD-10-CM

## 2019-06-27 RX ORDER — AMITRIPTYLINE HYDROCHLORIDE 25 MG/1
TABLET, FILM COATED ORAL
Qty: 90 TAB | Refills: 0 | Status: SHIPPED | OUTPATIENT
Start: 2019-06-27 | End: 2019-07-28 | Stop reason: SDUPTHER

## 2019-06-27 NOTE — TELEPHONE ENCOUNTER
Last seen 6/6/19    Last filled 12/16/18 qty 30 w/ 5 refill     Note to pharmacy, \"Needs to establish care with new PCP for further refills. \"    Prescription entered for 90 days w/ 0 refills

## 2019-07-24 DIAGNOSIS — E11.65 UNCONTROLLED TYPE 2 DIABETES MELLITUS WITH HYPERGLYCEMIA (HCC): ICD-10-CM

## 2019-07-24 RX ORDER — METFORMIN HYDROCHLORIDE 750 MG/1
750 TABLET, EXTENDED RELEASE ORAL DAILY
Qty: 90 TAB | Refills: 0 | Status: SHIPPED | OUTPATIENT
Start: 2019-07-24 | End: 2020-01-06 | Stop reason: SDUPTHER

## 2019-07-24 RX ORDER — LANCETS
EACH MISCELLANEOUS
Qty: 100 EACH | Refills: 0 | Status: SHIPPED | OUTPATIENT
Start: 2019-07-24 | End: 2020-01-23 | Stop reason: SDUPTHER

## 2019-07-24 NOTE — TELEPHONE ENCOUNTER
Last seen 6/6/19    Last filled (local phamarcy [facility] needs to go to mail order [facility])    Metformin 5/29/19 qty  Accu-Chek Colette Plus   Accu-Check Fastclix Lancet Drum    No future appointments. Notes to pharmacy, \"Needs to establish care with new PCP for further refills. \"    Prescription entered for 90 day supply only.

## 2019-07-28 DIAGNOSIS — F07.81 POST CONCUSSIVE SYNDROME: ICD-10-CM

## 2019-07-28 DIAGNOSIS — G44.52 NEW DAILY PERSISTENT HEADACHE: ICD-10-CM

## 2019-07-29 RX ORDER — AMITRIPTYLINE HYDROCHLORIDE 25 MG/1
TABLET, FILM COATED ORAL
Qty: 90 TAB | Refills: 0 | Status: SHIPPED | OUTPATIENT
Start: 2019-07-29 | End: 2020-01-23 | Stop reason: SDUPTHER

## 2019-09-11 ENCOUNTER — APPOINTMENT (OUTPATIENT)
Dept: NUTRITION | Age: 54
End: 2019-09-11

## 2020-01-06 DIAGNOSIS — E11.65 UNCONTROLLED TYPE 2 DIABETES MELLITUS WITH HYPERGLYCEMIA (HCC): ICD-10-CM

## 2020-01-06 NOTE — TELEPHONE ENCOUNTER
Patient's wife called requesting a refill for his medication. Requested Prescriptions     Pending Prescriptions Disp Refills    metFORMIN ER (GLUCOPHAGE XR) 750 mg tablet 90 Tab 0     Sig: Take 1 Tab by mouth daily.  tiZANidine (ZANAFLEX) 4 mg tablet 90 Tab 1     Sig: Take 1 Tab by mouth every twelve (12) hours as needed (back pain, muscle spasm). Take 1 Tab by mouth every twelve (12) hours as needed. Indications: muscle spasm, back pain    rosuvastatin (CRESTOR) 20 mg tablet 90 Tab 1     Sig: take 1 tablet by mouth at bedtime     Patient aware of 72 hour time frame. Former Parag Osman patient establishing care with [address] January 23.

## 2020-01-07 RX ORDER — METFORMIN HYDROCHLORIDE 750 MG/1
750 TABLET, EXTENDED RELEASE ORAL DAILY
Qty: 30 TAB | Refills: 0 | Status: SHIPPED | OUTPATIENT
Start: 2020-01-07 | End: 2020-01-23 | Stop reason: SDUPTHER

## 2020-01-07 RX ORDER — TIZANIDINE 4 MG/1
4 TABLET ORAL
Qty: 60 TAB | Refills: 0 | Status: SHIPPED | OUTPATIENT
Start: 2020-01-07 | End: 2020-01-27 | Stop reason: SDUPTHER

## 2020-01-07 RX ORDER — ROSUVASTATIN CALCIUM 20 MG/1
TABLET, COATED ORAL
Qty: 90 TAB | Refills: 0 | Status: SHIPPED | OUTPATIENT
Start: 2020-01-07 | End: 2020-01-23 | Stop reason: SDUPTHER

## 2020-01-23 ENCOUNTER — OFFICE VISIT (OUTPATIENT)
Dept: FAMILY MEDICINE CLINIC | Age: 55
End: 2020-01-23

## 2020-01-23 VITALS
RESPIRATION RATE: 20 BRPM | HEIGHT: 73 IN | OXYGEN SATURATION: 96 % | SYSTOLIC BLOOD PRESSURE: 123 MMHG | DIASTOLIC BLOOD PRESSURE: 81 MMHG | BODY MASS INDEX: 29.03 KG/M2 | WEIGHT: 219 LBS | TEMPERATURE: 98 F | HEART RATE: 74 BPM

## 2020-01-23 DIAGNOSIS — E78.2 MIXED HYPERLIPIDEMIA: Chronic | ICD-10-CM

## 2020-01-23 DIAGNOSIS — I11.9 HYPERTENSIVE LEFT VENTRICULAR HYPERTROPHY, WITHOUT HEART FAILURE: ICD-10-CM

## 2020-01-23 DIAGNOSIS — Z87.11 HISTORY OF PEPTIC ULCER: ICD-10-CM

## 2020-01-23 DIAGNOSIS — G44.52 NEW DAILY PERSISTENT HEADACHE: ICD-10-CM

## 2020-01-23 DIAGNOSIS — R06.09 DOE (DYSPNEA ON EXERTION): ICD-10-CM

## 2020-01-23 DIAGNOSIS — R42 DIZZINESS: ICD-10-CM

## 2020-01-23 DIAGNOSIS — I10 ESSENTIAL HYPERTENSION: Chronic | ICD-10-CM

## 2020-01-23 DIAGNOSIS — E11.9 TYPE 2 DIABETES MELLITUS WITHOUT COMPLICATION, WITHOUT LONG-TERM CURRENT USE OF INSULIN (HCC): Primary | ICD-10-CM

## 2020-01-23 DIAGNOSIS — F07.81 POST CONCUSSIVE SYNDROME: ICD-10-CM

## 2020-01-23 DIAGNOSIS — F07.81 POSTCONCUSSION SYNDROME: ICD-10-CM

## 2020-01-23 PROBLEM — E11.65 UNCONTROLLED TYPE 2 DIABETES MELLITUS WITH HYPERGLYCEMIA (HCC): Chronic | Status: RESOLVED | Noted: 2018-12-06 | Resolved: 2020-01-23

## 2020-01-23 LAB
ALBUMIN UR QL STRIP: 30 MG/L
CREATININE, URINE POC: 300 MG/DL
HBA1C MFR BLD HPLC: 6.7 %
MICROALBUMIN/CREAT RATIO POC: <30 MG/G

## 2020-01-23 RX ORDER — ROSUVASTATIN CALCIUM 20 MG/1
TABLET, COATED ORAL
Qty: 90 TAB | Refills: 3 | Status: SHIPPED | OUTPATIENT
Start: 2020-01-23 | End: 2020-01-27 | Stop reason: SDUPTHER

## 2020-01-23 RX ORDER — LANCETS
EACH MISCELLANEOUS
Qty: 100 EACH | Refills: 0 | Status: SHIPPED | OUTPATIENT
Start: 2020-01-23 | End: 2020-01-29 | Stop reason: SDUPTHER

## 2020-01-23 RX ORDER — METFORMIN HYDROCHLORIDE 750 MG/1
750 TABLET, EXTENDED RELEASE ORAL DAILY
Qty: 90 TAB | Refills: 1 | Status: SHIPPED | OUTPATIENT
Start: 2020-01-23 | End: 2020-01-24

## 2020-01-23 RX ORDER — HYDROCHLOROTHIAZIDE 12.5 MG/1
12.5 TABLET ORAL DAILY
Qty: 90 TAB | Refills: 1 | Status: SHIPPED | OUTPATIENT
Start: 2020-01-23 | End: 2020-01-27 | Stop reason: SDUPTHER

## 2020-01-23 RX ORDER — LISINOPRIL 20 MG/1
TABLET ORAL
Qty: 90 TAB | Refills: 1 | Status: SHIPPED | OUTPATIENT
Start: 2020-01-23 | End: 2020-01-27 | Stop reason: SDUPTHER

## 2020-01-23 RX ORDER — AMITRIPTYLINE HYDROCHLORIDE 25 MG/1
25 TABLET, FILM COATED ORAL
Qty: 90 TAB | Refills: 3 | Status: SHIPPED | OUTPATIENT
Start: 2020-01-23 | End: 2020-02-03 | Stop reason: SDUPTHER

## 2020-01-23 RX ORDER — LISINOPRIL 10 MG/1
10 TABLET ORAL DAILY
Qty: 90 TAB | Refills: 1 | Status: SHIPPED | OUTPATIENT
Start: 2020-01-23 | End: 2020-01-27 | Stop reason: SDUPTHER

## 2020-01-23 NOTE — PROGRESS NOTES
Kar Gerber is a 62 y.o. male (: 1965) presenting to address:    Chief Complaint   Patient presents with    Establish Care    Diabetes    Hypertension       Vitals:    20 1053   BP: 123/81   Pulse: 74   Resp: 20   Temp: 98 °F (36.7 °C)   TempSrc: Oral   SpO2: 96%   Weight: 219 lb (99.3 kg)   Height: 6' 1\" (1.854 m)   PainSc:   0 - No pain       Hearing/Vision:   No exam data present    Learning Assessment:     Learning Assessment 2015   PRIMARY LEARNER Patient   HIGHEST LEVEL OF EDUCATION - PRIMARY LEARNER  GRADUATED HIGH SCHOOL OR GED   BARRIERS PRIMARY LEARNER NONE   CO-LEARNER CAREGIVER No   PRIMARY LANGUAGE ENGLISH   LEARNER PREFERENCE PRIMARY OTHER (COMMENT)   ANSWERED BY patient   RELATIONSHIP SELF     Depression Screening:     3 most recent PHQ Screens 2020   Little interest or pleasure in doing things Not at all   Feeling down, depressed, irritable, or hopeless Not at all   Total Score PHQ 2 0     Fall Risk Assessment:     Fall Risk Assessment, last 12 mths 2019   Able to walk? Yes   Fall in past 12 months? No     Abuse Screening:     Abuse Screening Questionnaire 2019   Do you ever feel afraid of your partner? N   Are you in a relationship with someone who physically or mentally threatens you? N   Is it safe for you to go home? Y     Coordination of Care Questionaire:   1. Have you been to the ER, urgent care clinic since your last visit? Hospitalized since your last visit? NO    2. Have you seen or consulted any other health care providers outside of the [address] [place] [address] since your last visit? Include any pap smears or colon screening. NO    Advanced Directive:   1. Do you have an Advanced Directive? NO    2. Would you like information on Advanced Directives?  NO

## 2020-01-23 NOTE — PROGRESS NOTES
Assessment/Plan:    Diagnoses and all orders for this visit:    1. Type 2 diabetes mellitus without complication, without long-term current use of insulin (HCC)  -     AMB POC HEMOGLOBIN A1C  -     AMB POC URINE, MICROALBUMIN, SEMIQUANT (3 RESULTS)  -     metFORMIN ER (GLUCOPHAGE XR) 750 mg tablet; Take 1 Tab by mouth daily. 2. Essential hypertension  -     hydroCHLOROthiazide (HYDRODIURIL) 12.5 mg tablet; Take 1 Tab by mouth daily. -     lisinopril (PRINIVIL, ZESTRIL) 10 mg tablet; Take 1 Tab by mouth daily. -     lisinopril (PRINIVIL, ZESTRIL) 20 mg tablet; take 1 tablet by mouth once daily (total daily dose 30 mg)  -     AMB POC EKG ROUTINE W/ 12 LEADS, INTER & REP  -     ECHO ADULT COMPLETE; Future  -     EXERCISE CARDIAC STRESS TEST; Future    3. Mixed hyperlipidemia  -     rosuvastatin (CRESTOR) 20 mg tablet; take 1 tablet by mouth at bedtime    4. History of peptic ulcer    5. Post concussive syndrome    6. New daily persistent headache  -     amitriptyline (ELAVIL) 25 mg tablet; Take 1 Tab by mouth nightly. 7. Postconcussion syndrome    8. Dizziness  -     AMB POC EKG ROUTINE W/ 12 LEADS, INTER & REP  -     ECHO ADULT COMPLETE; Future  -     EXERCISE CARDIAC STRESS TEST; Future    9. ROSS (dyspnea on exertion)  -     ECHO ADULT COMPLETE; Future  -     EXERCISE CARDIAC STRESS TEST; Future    Other orders  -     glucose blood VI test strips (ACCU-CHEK TRISTA PLUS TEST STRP) strip; Test BS daily and PRN   Dx code E11.65  -     lancets (ACCU-CHEK FASTCLIX LANCET DRUM) misc; Test BS daily and PRN   Dx code E11.65        The plan was discussed with the patient. The patient verbalized understanding and is in agreement with the plan. All medication potential side effects were discussed with the patient.   Health Maintenance:   Health Maintenance   Topic Date Due    Pneumococcal 0-64 years (1 of 1 - PPSV23) 05/04/1969    MICROALBUMIN Q1  05/04/1973    Shingrix Vaccine Age 50> (1 of 2) 05/04/2013    MEDICARE YEARLY EXAM  03/14/2018    Influenza Age 5 to Adult  08/01/2019    HEMOGLOBIN A1C Q6M  11/16/2019    LIPID PANEL Q1  05/16/2020    FOOT EXAM Q1  06/06/2020    EYE EXAM RETINAL OR DILATED  06/20/2021    COLONOSCOPY  01/20/2026    DTaP/Tdap/Td series (2 - Td) 12/26/2027    Hepatitis C Screening  Completed       Aman Graves is a 62 y.o.  male and presents with Establish Care     Subjective:  Pt is here to establish care, transferring from Dr. Danielle Olivo. DM2- A1c is 6.7. On metformin. Following a diabetic diet. Starting to exercise more. HTN _ bp controlled on current regimen. No side effects. HLD - on crestor. Tolerating well. No side effects. States he has difficulty cutting grass when it's hot. States he \"gets dizzy and short winded\". No cp. Has h/o chest pain related to cocaine use. Cath 2011 was neg. ROS:  Constitutional: No recent weight change. No weakness/fatigue. No f/c. Skin: No rashes, change in nails/hair, itching   HENT: No HA, dizziness. No hearing loss/tinnitus. No nasal congestion/discharge. Eyes: No change in vision, double/blurred vision or eye pain/redness. Cardiovascular: No CP/palpitations. No ROSS/orthopnea/PND. Respiratory: No cough/sputum, dyspnea, wheezing. Gastointestinal: No dysphagia, reflux. No n/v. No constipation/diarrhea. No melena/rectal bleeding. Genitourinary: No dysuria, urinary hesitancy, nocturia, hematuria. No incontinence. Musculoskeletal: No joint pain/stiffness. No muscle pain/tenderness. Endo: No heat/cold intolerance, no polyuria/polydypsia. Heme: No h/o anemia. No easy bleeding/bruising. Allergy/Immunology: No seasonal rhinitis. Denies frequent colds, sinus/ear infections. Neurological: No seizures/numbness/weakness. No paresthesias. Psychiatric:  No depression, anxiety.    PMH:  Past Medical History:   Diagnosis Date    Allergic rhinitis due to pollen    Verlinda Smoker Diverticulosis Jan 2016    Colonoscopy - Dr Susannah Reeves  Essential hypertension     Helicobacter pylori gastritis 7/22/2015    Clarithromycin, Amoxil, and Omeprazole BID x 14 days prescribed    Hemorrhoid Jan 2016    Colonoscopy - Dr Ramesh Ramirez Herniated disc     per pt multiple levels    History of lumbar fusion 1/8/2016    Influenza vaccination declined by patient - 12/26/17 12/26/2017    Noncompliance with treatment plan     Pancreatitis     PUD (peptic ulcer disease) Jan 2016    No H Pylori on biopsy, multiple small ulcers on EGD (Dr. Kapil Delcid)    Scleral icterus 7/9/2015       PSH:  Past Surgical History:   Procedure Laterality Date    HX COLONOSCOPY N/A Jan 2016    Dr. Kapil Delcid - hemorrhoids and diverticulosis, 10 year interval    HX ENDOSCOPY N/A Jan 2016    Dr. Kapil Delcid - multiple small ulcers, neg H pylori    HX LUMBAR FUSION          SH:  Social History     Tobacco Use    Smoking status: Former Smoker     Types: Cigars    Smokeless tobacco: Never Used   Substance Use Topics    Alcohol use: Not Currently     Alcohol/week: 2.5 standard drinks     Types: 3 Standard drinks or equivalent per week     Comment: 5/2019 stopped drinking a few months ago and lost 30#s    Drug use: Yes     Comment: Marijuana       FH:  Family History   Problem Relation Age of Onset    Diabetes Mother     Hypertension Father     Stroke Sister     Diabetes Sister        Medications/Allergies:    Current Outpatient Medications:     metFORMIN ER (GLUCOPHAGE XR) 750 mg tablet, Take 1 Tab by mouth daily. , Disp: 30 Tab, Rfl: 0    tiZANidine (ZANAFLEX) 4 mg tablet, Take 1 Tab by mouth every twelve (12) hours as needed (back pain, muscle spasm). Take 1 Tab by mouth every twelve (12) hours as needed.   Indications: muscle spasm, back pain, Disp: 60 Tab, Rfl: 0    rosuvastatin (CRESTOR) 20 mg tablet, take 1 tablet by mouth at bedtime, Disp: 90 Tab, Rfl: 0    amitriptyline (ELAVIL) 25 mg tablet, take 1 tablet by mouth every evening ABOUT 1 HOUR BEFORE BEDTIME, Disp: 90 Tab, Rfl: 0    glucose blood VI test strips (ACCU-CHEK TRISTA PLUS TEST STRP) strip, Test BS daily and PRN   Dx code E11.65, Disp: 100 Strip, Rfl: 0    lancets (ACCU-CHEK FASTCLIX LANCET DRUM) misc, Test BS daily and PRN   Dx code E11.65, Disp: 100 Each, Rfl: 0    Blood-Glucose Meter (ACCU-CHEK TRISTA CONNECT METER) misc, E11.65 - BG testing daily and PRN, Disp: 1 Each, Rfl: 0    lancets (ACCU-CHEK MULTICLIX LANCET) misc, E11.65 - BG testing daily and PRN, Disp: 50 Each, Rfl: 11    Lancing Device with Lancets (ACCU-CHEK MULTICLIX LANCET) kit, E11.65 - BG testing daily and PRN, Disp: 1 Kit, Rfl: 0    acetaminophen (TYLENOL) 325 mg tablet, Take 650 mg by mouth every four (4) hours as needed for Pain., Disp: , Rfl:     hydroCHLOROthiazide (HYDRODIURIL) 12.5 mg tablet, Take 1 Tab by mouth daily. , Disp: 90 Tab, Rfl: 1    lisinopril (PRINIVIL, ZESTRIL) 10 mg tablet, Take 1 Tab by mouth daily. , Disp: 90 Tab, Rfl: 1    lisinopril (PRINIVIL, ZESTRIL) 20 mg tablet, take 1 tablet by mouth once daily (total daily dose 30 mg), Disp: 90 Tab, Rfl: 1    clotrimazole-betamethasone (LOTRISONE) topical cream, apply to affected area twice a day, Disp: 30 g, Rfl: 1    ondansetron (ZOFRAN ODT) 4 mg disintegrating tablet, Take 1 Tab by mouth every eight (8) hours as needed for Nausea., Disp: 40 Tab, Rfl: 1    Omeprazole delayed release (PRILOSEC D/R) 20 mg tablet, Take 1 Tab by mouth two (2) times a day. Indications: HELICOBACTER PYLORI GASTRITIS, Disp: 28 Tab, Rfl: 0  No Known Allergies    Objective:  Visit Vitals  /81 (BP 1 Location: Left arm, BP Patient Position: Sitting)   Pulse 74   Temp 98 °F (36.7 °C) (Oral)   Resp 20   Ht 6' 1\" (1.854 m)   Wt 219 lb (99.3 kg)   SpO2 96%   BMI 28.89 kg/m²      Constitutional: Well developed, nourished, no distress, alert   Cardiovascular: S1, S2.  RRR. No murmurs/rubs. No thrills palpated. No carotid bruits. Intact distal pulses. No edema.    Pulmonary/Chest Wall: No abnormalities on inspection. Clear to auscultation bilaterally. No wheezing/rhonchi. Normal effort. GI: Soft, nontender, nondistended. Normal active bowel sounds. No  masses on palpation. No hepatosplenomegaly. Ekg: NSR, LVH with repolarization changes.

## 2020-01-24 DIAGNOSIS — E11.9 TYPE 2 DIABETES MELLITUS WITHOUT COMPLICATION, WITHOUT LONG-TERM CURRENT USE OF INSULIN (HCC): ICD-10-CM

## 2020-01-24 RX ORDER — METFORMIN HYDROCHLORIDE 750 MG/1
TABLET, EXTENDED RELEASE ORAL
Qty: 90 TAB | Refills: 1 | Status: SHIPPED | OUTPATIENT
Start: 2020-01-24 | End: 2020-02-03

## 2020-01-27 DIAGNOSIS — E78.2 MIXED HYPERLIPIDEMIA: Chronic | ICD-10-CM

## 2020-01-27 DIAGNOSIS — E11.9 TYPE 2 DIABETES MELLITUS WITHOUT COMPLICATION, WITHOUT LONG-TERM CURRENT USE OF INSULIN (HCC): ICD-10-CM

## 2020-01-27 DIAGNOSIS — I10 ESSENTIAL HYPERTENSION: Chronic | ICD-10-CM

## 2020-01-27 RX ORDER — METFORMIN HYDROCHLORIDE 750 MG/1
TABLET, EXTENDED RELEASE ORAL
Qty: 90 TAB | Refills: 1 | Status: CANCELLED | OUTPATIENT
Start: 2020-01-27

## 2020-01-27 RX ORDER — LISINOPRIL 10 MG/1
10 TABLET ORAL DAILY
Qty: 90 TAB | Refills: 1 | Status: SHIPPED | OUTPATIENT
Start: 2020-01-27 | End: 2020-06-22

## 2020-01-27 RX ORDER — LISINOPRIL 20 MG/1
TABLET ORAL
Qty: 90 TAB | Refills: 1 | Status: SHIPPED | OUTPATIENT
Start: 2020-01-27 | End: 2020-06-22

## 2020-01-27 RX ORDER — HYDROCHLOROTHIAZIDE 12.5 MG/1
12.5 TABLET ORAL DAILY
Qty: 90 TAB | Refills: 1 | Status: SHIPPED | OUTPATIENT
Start: 2020-01-27 | End: 2020-06-22

## 2020-01-27 RX ORDER — TIZANIDINE 4 MG/1
4 TABLET ORAL
Qty: 90 TAB | Refills: 0 | Status: SHIPPED | OUTPATIENT
Start: 2020-01-27 | End: 2020-04-02

## 2020-01-27 NOTE — TELEPHONE ENCOUNTER
[phone] fax #  Requested Prescriptions     Pending Prescriptions Disp Refills    metFORMIN ER (GLUCOPHAGE XR) 750 mg tablet 90 Tab 1    rosuvastatin (CRESTOR) 20 mg tablet 90 Tab 3     Sig: take 1 tablet by mouth at bedtime     Future Appointments   Date Time Provider Rosalind Baca   5/4/2020 10:45 AM Yoni Dumas MD [facility]

## 2020-01-27 NOTE — TELEPHONE ENCOUNTER
Last OV 1/23/2020 . Next OV 5/4/2020 . Lisinopril last refilled 1/23/2020 to [facility] . Patient is switching to mail order . Tizanidine 4 mg last refilled 1/7/2020 mail order is requesting script for 90 tabs .  HCTZ 1/23/202

## 2020-01-28 RX ORDER — ROSUVASTATIN CALCIUM 20 MG/1
TABLET, COATED ORAL
Qty: 90 TAB | Refills: 3 | Status: SHIPPED | OUTPATIENT
Start: 2020-01-28 | End: 2020-06-29 | Stop reason: SDUPTHER

## 2020-01-29 DIAGNOSIS — E11.65 UNCONTROLLED TYPE 2 DIABETES MELLITUS WITH HYPERGLYCEMIA (HCC): Chronic | ICD-10-CM

## 2020-01-29 RX ORDER — ISOPROPYL ALCOHOL 70 ML/100ML
SWAB TOPICAL
Qty: 90 PAD | Refills: 1 | Status: SHIPPED | OUTPATIENT
Start: 2020-01-29 | End: 2020-06-22

## 2020-01-29 RX ORDER — LANCETS
EACH MISCELLANEOUS
Qty: 100 EACH | Refills: 3 | Status: SHIPPED | OUTPATIENT
Start: 2020-01-29 | End: 2020-03-12 | Stop reason: SDUPTHER

## 2020-01-29 RX ORDER — BLOOD-GLUCOSE METER
EACH MISCELLANEOUS
Qty: 1 EACH | Refills: 0 | Status: SHIPPED | OUTPATIENT
Start: 2020-01-29

## 2020-01-29 NOTE — TELEPHONE ENCOUNTER
Amitriptyline last refilled 1/23/2020 a to local needs to be resent to [facility] . Last OV 1/23/2020 .  Next Ov 5/4/2020

## 2020-02-03 DIAGNOSIS — E11.9 TYPE 2 DIABETES MELLITUS WITHOUT COMPLICATION, WITHOUT LONG-TERM CURRENT USE OF INSULIN (HCC): ICD-10-CM

## 2020-02-03 DIAGNOSIS — G44.52 NEW DAILY PERSISTENT HEADACHE: ICD-10-CM

## 2020-02-03 RX ORDER — METFORMIN HYDROCHLORIDE 750 MG/1
TABLET, EXTENDED RELEASE ORAL
Qty: 90 TAB | Refills: 1 | Status: SHIPPED | OUTPATIENT
Start: 2020-02-03 | End: 2020-06-29 | Stop reason: SDUPTHER

## 2020-02-03 RX ORDER — AMITRIPTYLINE HYDROCHLORIDE 25 MG/1
25 TABLET, FILM COATED ORAL
Qty: 90 TAB | Refills: 3 | Status: SHIPPED | OUTPATIENT
Start: 2020-02-03 | End: 2020-06-29 | Stop reason: SDUPTHER

## 2020-02-03 RX ORDER — LANCETS
EACH MISCELLANEOUS
Qty: 100 EACH | Refills: 1 | Status: SHIPPED | OUTPATIENT
Start: 2020-02-03 | End: 2020-03-12 | Stop reason: SDUPTHER

## 2020-02-03 RX ORDER — LANCETS
EACH MISCELLANEOUS
Qty: 200 EACH | Refills: 3 | OUTPATIENT
Start: 2020-02-03 | End: 2020-03-12 | Stop reason: SDUPTHER

## 2020-02-03 NOTE — TELEPHONE ENCOUNTER
humana requested Accu check Softclix lancets instead of the fastclix drum . [place] verbal [place] in [place] [facility] [place] [facility] 2/3/2020 1:55pm. They suggest 200 lancets for 90 days since testing prn as well.      Future Appointments   Date Time Provider Rosalind Baca   5/4/2020 10:45 AM Júnior García MD [facility]

## 2020-02-04 DIAGNOSIS — G44.52 NEW DAILY PERSISTENT HEADACHE: ICD-10-CM

## 2020-02-04 RX ORDER — AMITRIPTYLINE HYDROCHLORIDE 25 MG/1
25 TABLET, FILM COATED ORAL
Qty: 90 TAB | Refills: 3 | OUTPATIENT
Start: 2020-02-04

## 2020-02-04 NOTE — TELEPHONE ENCOUNTER
[address] is requesting a script of amitriptyline to be sent to them. Patient was just seen January 23rd.

## 2020-03-12 RX ORDER — LANCETS
EACH MISCELLANEOUS
Qty: 100 EACH | Refills: 3 | Status: SHIPPED | OUTPATIENT
Start: 2020-03-12 | End: 2021-10-21 | Stop reason: SDUPTHER

## 2020-03-12 RX ORDER — LANCETS
EACH MISCELLANEOUS
Qty: 100 EACH | Refills: 1 | Status: SHIPPED | OUTPATIENT
Start: 2020-03-12 | End: 2022-05-16

## 2020-04-02 RX ORDER — TIZANIDINE 4 MG/1
TABLET ORAL
Qty: 90 TAB | Refills: 0 | Status: SHIPPED | OUTPATIENT
Start: 2020-04-02 | End: 2020-05-18

## 2020-05-18 RX ORDER — TIZANIDINE 4 MG/1
TABLET ORAL
Qty: 90 TAB | Refills: 0 | Status: SHIPPED | OUTPATIENT
Start: 2020-05-18 | End: 2020-07-06

## 2020-06-04 DIAGNOSIS — E11.65 UNCONTROLLED TYPE 2 DIABETES MELLITUS WITH HYPERGLYCEMIA (HCC): Chronic | ICD-10-CM

## 2020-06-04 RX ORDER — LANCING DEVICE/LANCETS
KIT MISCELLANEOUS
Qty: 1 KIT | Refills: 0 | Status: SHIPPED | OUTPATIENT
Start: 2020-06-04 | End: 2021-10-21 | Stop reason: SDUPTHER

## 2020-06-19 DIAGNOSIS — I10 ESSENTIAL HYPERTENSION: Chronic | ICD-10-CM

## 2020-06-22 RX ORDER — LISINOPRIL 10 MG/1
TABLET ORAL
Qty: 90 TAB | Refills: 1 | Status: SHIPPED | OUTPATIENT
Start: 2020-06-22 | End: 2020-11-18

## 2020-06-22 RX ORDER — HYDROCHLOROTHIAZIDE 12.5 MG/1
TABLET ORAL
Qty: 90 TAB | Refills: 1 | Status: SHIPPED | OUTPATIENT
Start: 2020-06-22 | End: 2020-11-18

## 2020-06-22 RX ORDER — ISOPROPYL ALCOHOL 70 ML/100ML
SWAB TOPICAL
Qty: 90 PAD | Refills: 0 | Status: SHIPPED | OUTPATIENT
Start: 2020-06-22 | End: 2020-09-04

## 2020-06-22 RX ORDER — LISINOPRIL 20 MG/1
TABLET ORAL
Qty: 90 TAB | Refills: 1 | Status: SHIPPED | OUTPATIENT
Start: 2020-06-22 | End: 2020-11-18

## 2020-06-29 ENCOUNTER — VIRTUAL VISIT (OUTPATIENT)
Dept: FAMILY MEDICINE CLINIC | Age: 55
End: 2020-06-29

## 2020-06-29 DIAGNOSIS — E78.2 MIXED HYPERLIPIDEMIA: Chronic | ICD-10-CM

## 2020-06-29 DIAGNOSIS — I10 ESSENTIAL HYPERTENSION: Chronic | ICD-10-CM

## 2020-06-29 DIAGNOSIS — E11.9 TYPE 2 DIABETES MELLITUS WITHOUT COMPLICATION, WITHOUT LONG-TERM CURRENT USE OF INSULIN (HCC): Primary | ICD-10-CM

## 2020-06-29 DIAGNOSIS — G44.52 NEW DAILY PERSISTENT HEADACHE: ICD-10-CM

## 2020-06-29 RX ORDER — ROSUVASTATIN CALCIUM 20 MG/1
TABLET, COATED ORAL
Qty: 90 TAB | Refills: 3 | Status: SHIPPED | OUTPATIENT
Start: 2020-06-29 | End: 2021-08-02 | Stop reason: SDUPTHER

## 2020-06-29 RX ORDER — AMITRIPTYLINE HYDROCHLORIDE 25 MG/1
25 TABLET, FILM COATED ORAL
Qty: 90 TAB | Refills: 3 | Status: SHIPPED | OUTPATIENT
Start: 2020-06-29 | End: 2021-08-31 | Stop reason: SDUPTHER

## 2020-06-29 RX ORDER — METFORMIN HYDROCHLORIDE 750 MG/1
TABLET, EXTENDED RELEASE ORAL
Qty: 90 TAB | Refills: 1 | Status: SHIPPED | OUTPATIENT
Start: 2020-06-29 | End: 2021-06-16 | Stop reason: SDUPTHER

## 2020-06-29 NOTE — PROGRESS NOTES
Traci Shea is a 60 y.o. male who was seen by synchronous (real-time) audio-video technology on 6/29/2020. Consent: Traci Shea, who was seen by synchronous (real-time) audio-video technology, and/or his healthcare decision maker, is aware that this patient-initiated, Telehealth encounter on 6/29/2020 is a billable service, with coverage as determined by his insurance carrier. He is aware that he may receive a bill and has provided verbal consent to proceed: Yes. Assessment & Plan:   Diagnoses and all orders for this visit:    1. Type 2 diabetes mellitus without complication, without long-term current use of insulin (HCC)  -     CBC W/O DIFF; Future  -     METABOLIC PANEL, COMPREHENSIVE; Future  -     LIPID PANEL; Future  -     HEMOGLOBIN A1C W/O EAG; Future  -     MICROALBUMIN, UR, RAND W/ MICROALB/CREAT RATIO; Future    2. Mixed hyperlipidemia  -     METABOLIC PANEL, COMPREHENSIVE; Future  -     LIPID PANEL; Future    3. Essential hypertension  -     CBC W/O DIFF; Future  -     METABOLIC PANEL, COMPREHENSIVE; Future  -     LIPID PANEL; Future            Subjective:   Traci Shea is a 60 y.o. male who was seen for Diabetes    dm2 - due for labs. bs mostly <120s. htn - bp running 130/80s. hld - on statin. Tolerating well. Prior to Admission medications    Medication Sig Start Date End Date Taking?  Authorizing Provider   hydroCHLOROthiazide (HYDRODIURIL) 12.5 mg tablet TAKE 1 TABLET EVERY DAY 6/22/20   Bennett Pa MD   lisinopriL (PRINIVIL, ZESTRIL) 20 mg tablet TAKE 1 TABLET BY MOUTH ONCE DAILY (TOTAL DAILY DOSE 30 MG) 6/22/20   Bennett Pa MD   lisinopriL (PRINIVIL, ZESTRIL) 10 mg tablet TAKE 1 TABLET EVERY DAY (TAKE WITH 20 MG TABLET) 6/22/20   Aurelia Guerra MD   alcohol swabs (BD Single Use Swabs Regular) padm USE EVERY DAY 6/22/20   Aurelia Guerra MD   Lancing Device with Lancets (Accu-Chek Multiclix Lancet) kit E11.65 - BG testing daily and PRN 6/4/20   Ember Leticia Anthony MD   tiZANidine (ZANAFLEX) 4 mg tablet TAKE 1 TABLET EVERY 12 HOURS AS NEEDED FOR BACK PAIN, MUSCLE SPASMS 5/18/20   Leticia Marino MD   lancets (Accu-Chek Fastclix Lancet Drum) misc Test BS daily    Dx code Q92.79 3/12/20   Andrea Murry MD   lancets (Accu-Chek Softclix Lancets) misc Use to check  BS daily   Dx code E11.65 3/12/20   Andrea Murry MD   metFORMIN ER (GLUCOPHAGE XR) 750 mg tablet TAKE 1 TABLET EVERY DAY 2/3/20   Lance Pattison Leticia Anthony MD   amitriptyline (ELAVIL) 25 mg tablet Take 1 Tab by mouth nightly. 2/3/20   Andrea Murry MD   Blood Glucose Control High&Low (ACCU-CHEK TRISTA CONTROL SOLN) soln Use as directed 1/29/20   Andrea Murry MD   Blood-Glucose Meter (ACCU-CHEK TRISTA CONNECT METER) misc E11.65 - BG testing daily and PRN 1/29/20   Andrea Murry MD   glucose blood VI test strips (ACCU-CHEK TRISTA PLUS TEST STRP) strip Test BS daily and PRN   Dx code E11.65 1/29/20   Andrea Murry MD   rosuvastatin (CRESTOR) 20 mg tablet take 1 tablet by mouth at bedtime 1/28/20   Andrea Murry MD   acetaminophen (TYLENOL) 325 mg tablet Take 650 mg by mouth every four (4) hours as needed for Pain. Provider, Historical   clotrimazole-betamethasone (LOTRISONE) topical cream apply to affected area twice a day 12/6/18   Colin Jeffery MD   Omeprazole delayed release (PRILOSEC D/R) 20 mg tablet Take 1 Tab by mouth two (2) times a day. Indications: HELICOBACTER PYLORI GASTRITIS 11/28/16   Colin Jeffery MD   ondansetron [facility] ODT) 4 mg disintegrating tablet Take 1 Tab by mouth every eight (8) hours as needed for Nausea.  1/8/16   Colin Jeffery MD     No Known Allergies    Patient Active Problem List   Diagnosis Code    Essential hypertension I10    Chronic bilateral low back pain without sciatica M54.5, G89.29    Mixed hyperlipidemia E78.2    Headache disorder R51    Influenza vaccination declined by patient Z28.21    History of peptic ulcer Z87.11    Postconcussion syndrome F07.81    Hypertensive left ventricular hypertrophy, without heart failure I11.9    Type 2 diabetes mellitus without complication, without long-term current use of insulin (HCC) E11.9       Review of Systems   Respiratory: Negative for cough. Cardiovascular: Negative for chest pain. Objective:   Vital Signs: (As obtained by patient/caregiver at home)  There were no vitals taken for this visit. [INSTRUCTIONS:  \"[x]\" Indicates a positive item  \"[]\" Indicates a negative item  -- DELETE ALL ITEMS NOT EXAMINED]    Constitutional: [x] Appears well-developed and well-nourished [x] No apparent distress      [] Abnormal -     Mental status: [x] Alert and awake  [x] Oriented to person/place/time [x] Able to follow commands    [] Abnormal -     Eyes:   EOM    [x]  Normal    [] Abnormal -   Sclera  [x]  Normal    [] Abnormal -          Discharge [x]  None visible   [] Abnormal -     HENT: [x] Normocephalic, atraumatic  [] Abnormal -   [x] Mouth/Throat: Mucous membranes are moist    External Ears [x] Normal  [] Abnormal -    Neck: [x] No visualized mass [] Abnormal -     Pulmonary/Chest: [x] Respiratory effort normal   [x] No visualized signs of difficulty breathing or respiratory distress        [] Abnormal -      Musculoskeletal:   [] Normal gait with no signs of ataxia         [] Normal range of motion of neck        [] Abnormal -     Neurological:        [] No Facial Asymmetry (Cranial nerve 7 motor function) (limited exam due to video visit)          [] No gaze palsy        [] Abnormal -          Skin:        [] No significant exanthematous lesions or discoloration noted on facial skin         [] Abnormal -            Psychiatric:       [x] Normal Affect [] Abnormal -        [x] No Hallucinations    Other pertinent observable physical exam findings:-        We discussed the expected course, resolution and complications of the diagnosis(es) in detail.   Medication risks, benefits, costs, interactions, and alternatives were discussed as indicated. I advised him to contact the office if his condition worsens, changes or fails to improve as anticipated. He expressed understanding with the diagnosis(es) and plan. Freddie Alford is a 60 y.o. male who was evaluated by a video visit encounter for concerns as above. Patient identification was verified prior to start of the visit. A caregiver was present when appropriate. Due to this being a TeleHealth encounter (During [facility] public health emergency), evaluation of the following organ systems was limited: Vitals/Constitutional/EENT/Resp/CV/GI//MS/Neuro/Skin/Heme-Lymph-Imm. Pursuant to the emergency declaration under the [facility], [facility]5 waiver authority and the [employer] and [employer] Act, this Virtual  Visit was conducted, with patient's (and/or legal guardian's) consent, to reduce the patient's risk of exposure to COVID-19 and provide necessary medical care. Services were provided through a video synchronous discussion virtually to substitute for in-person clinic visit. Patient and provider were located at their individual homes.       Pablo Alva MD

## 2020-07-06 RX ORDER — TIZANIDINE 4 MG/1
TABLET ORAL
Qty: 90 TAB | Refills: 0 | Status: SHIPPED | OUTPATIENT
Start: 2020-07-06 | End: 2020-08-17

## 2020-08-17 RX ORDER — TIZANIDINE 4 MG/1
TABLET ORAL
Qty: 90 TAB | Refills: 0 | Status: SHIPPED | OUTPATIENT
Start: 2020-08-17 | End: 2020-09-30

## 2020-09-04 RX ORDER — ISOPROPYL ALCOHOL 70 ML/100ML
SWAB TOPICAL
Qty: 100 PAD | Refills: 3 | Status: SHIPPED | OUTPATIENT
Start: 2020-09-04 | End: 2022-01-19 | Stop reason: SDUPTHER

## 2020-09-12 LAB
A-G RATIO,AGRAT: 1.4 RATIO (ref 1.1–2.6)
ALBUMIN SERPL-MCNC: 4.4 G/DL (ref 3.5–5)
ALP SERPL-CCNC: 55 U/L (ref 25–115)
ALT SERPL-CCNC: 23 U/L (ref 5–40)
ANION GAP SERPL CALC-SCNC: 10.7 MMOL/L (ref 3–15)
AST SERPL W P-5'-P-CCNC: 20 U/L (ref 10–37)
AVG GLU, 10930: 187 MG/DL (ref 91–123)
BILIRUB SERPL-MCNC: 0.7 MG/DL (ref 0.2–1.2)
BUN SERPL-MCNC: 14 MG/DL (ref 6–22)
CALCIUM SERPL-MCNC: 9.4 MG/DL (ref 8.4–10.5)
CHLORIDE SERPL-SCNC: 102 MMOL/L (ref 98–110)
CHOLEST SERPL-MCNC: 100 MG/DL (ref 110–200)
CO2 SERPL-SCNC: 26 MMOL/L (ref 20–32)
CREAT SERPL-MCNC: 1.3 MG/DL (ref 0.5–1.2)
CREATININE, URINE: 152 MG/DL
CREATININE, URINE: 169 MG/DL
ERYTHROCYTE [DISTWIDTH] IN BLOOD BY AUTOMATED COUNT: 14.1 % (ref 10–15.5)
GFRAA, 66117: >60
GFRNA, 66118: 54.9
GLOBULIN,GLOB: 3.2 G/DL (ref 2–4)
GLUCOSE SERPL-MCNC: 156 MG/DL (ref 70–99)
HBA1C MFR BLD HPLC: 8.1 % (ref 4.8–5.6)
HCT VFR BLD AUTO: 43.8 % (ref 39.3–51.6)
HDLC SERPL-MCNC: 2.7 MG/DL (ref 0–5)
HDLC SERPL-MCNC: 37 MG/DL
HGB BLD-MCNC: 14.4 G/DL (ref 13.1–17.2)
LDL/HDL RATIO,LDHD: 1
LDLC SERPL CALC-MCNC: 38 MG/DL (ref 50–99)
MCH RBC QN AUTO: 29 PG (ref 26–34)
MCHC RBC AUTO-ENTMCNC: 33 G/DL (ref 31–36)
MCV RBC AUTO: 89 FL (ref 80–95)
MICROALB/CREAT RATIO, 140286: 9.2 (ref 0–30)
MICROALBUMIN,URINE RANDOM 140054: 14 MG/L (ref 0.1–17)
NON-HDL CHOLESTEROL, 011976: 63 MG/DL
PLATELET # BLD AUTO: 260 K/UL (ref 140–440)
PMV BLD AUTO: 10.3 FL (ref 9–13)
POTASSIUM SERPL-SCNC: 3.7 MMOL/L (ref 3.5–5.5)
PROT SERPL-MCNC: 7.6 G/DL (ref 6.4–8.3)
RBC # BLD AUTO: 4.94 M/UL (ref 3.8–5.8)
SODIUM SERPL-SCNC: 139 MMOL/L (ref 133–145)
TRIGL SERPL-MCNC: 122 MG/DL (ref 40–149)
VLDLC SERPL CALC-MCNC: 24 MG/DL (ref 8–30)
WBC # BLD AUTO: 8.6 K/UL (ref 4–11)

## 2020-09-30 RX ORDER — TIZANIDINE 4 MG/1
TABLET ORAL
Qty: 90 TAB | Refills: 0 | Status: SHIPPED | OUTPATIENT
Start: 2020-09-30 | End: 2020-11-12

## 2020-11-12 RX ORDER — TIZANIDINE 4 MG/1
TABLET ORAL
Qty: 90 TAB | Refills: 0 | Status: SHIPPED | OUTPATIENT
Start: 2020-11-12 | End: 2020-12-29

## 2020-11-18 DIAGNOSIS — I10 ESSENTIAL HYPERTENSION: Chronic | ICD-10-CM

## 2020-11-18 RX ORDER — LISINOPRIL 10 MG/1
TABLET ORAL
Qty: 90 TAB | Refills: 1 | Status: SHIPPED | OUTPATIENT
Start: 2020-11-18 | End: 2021-04-27 | Stop reason: DRUGHIGH

## 2020-11-18 RX ORDER — LISINOPRIL 20 MG/1
TABLET ORAL
Qty: 90 TAB | Refills: 1 | Status: SHIPPED | OUTPATIENT
Start: 2020-11-18 | End: 2021-04-27 | Stop reason: DRUGHIGH

## 2020-11-18 RX ORDER — HYDROCHLOROTHIAZIDE 12.5 MG/1
TABLET ORAL
Qty: 90 TAB | Refills: 1 | Status: SHIPPED | OUTPATIENT
Start: 2020-11-18 | End: 2021-08-13 | Stop reason: SDUPTHER

## 2020-12-29 RX ORDER — TIZANIDINE 4 MG/1
TABLET ORAL
Qty: 90 TAB | Refills: 0 | Status: SHIPPED | OUTPATIENT
Start: 2020-12-29 | End: 2021-06-18 | Stop reason: SDUPTHER

## 2021-04-22 ENCOUNTER — VIRTUAL VISIT (OUTPATIENT)
Dept: FAMILY MEDICINE CLINIC | Age: 56
End: 2021-04-22
Payer: MEDICARE

## 2021-04-22 VITALS — HEIGHT: 73 IN | BODY MASS INDEX: 27.83 KG/M2 | WEIGHT: 210 LBS

## 2021-04-22 DIAGNOSIS — I11.9 HYPERTENSIVE LEFT VENTRICULAR HYPERTROPHY, WITHOUT HEART FAILURE: ICD-10-CM

## 2021-04-22 DIAGNOSIS — E11.9 TYPE 2 DIABETES MELLITUS WITHOUT COMPLICATION, WITHOUT LONG-TERM CURRENT USE OF INSULIN (HCC): Primary | ICD-10-CM

## 2021-04-22 DIAGNOSIS — M54.50 CHRONIC BILATERAL LOW BACK PAIN WITHOUT SCIATICA: ICD-10-CM

## 2021-04-22 DIAGNOSIS — I10 ESSENTIAL HYPERTENSION: Chronic | ICD-10-CM

## 2021-04-22 DIAGNOSIS — G89.29 CHRONIC BILATERAL LOW BACK PAIN WITHOUT SCIATICA: ICD-10-CM

## 2021-04-22 DIAGNOSIS — E78.2 MIXED HYPERLIPIDEMIA: Chronic | ICD-10-CM

## 2021-04-22 DIAGNOSIS — F07.81 POSTCONCUSSION SYNDROME: ICD-10-CM

## 2021-04-22 PROCEDURE — 3017F COLORECTAL CA SCREEN DOC REV: CPT | Performed by: LEGAL MEDICINE

## 2021-04-22 PROCEDURE — 99214 OFFICE O/P EST MOD 30 MIN: CPT | Performed by: LEGAL MEDICINE

## 2021-04-22 PROCEDURE — 3046F HEMOGLOBIN A1C LEVEL >9.0%: CPT | Performed by: LEGAL MEDICINE

## 2021-04-22 PROCEDURE — G8756 NO BP MEASURE DOC: HCPCS | Performed by: LEGAL MEDICINE

## 2021-04-22 PROCEDURE — G8427 DOCREV CUR MEDS BY ELIG CLIN: HCPCS | Performed by: LEGAL MEDICINE

## 2021-04-22 PROCEDURE — G8510 SCR DEP NEG, NO PLAN REQD: HCPCS | Performed by: LEGAL MEDICINE

## 2021-04-22 PROCEDURE — 2022F DILAT RTA XM EVC RTNOPTHY: CPT | Performed by: LEGAL MEDICINE

## 2021-04-22 RX ORDER — CLOTRIMAZOLE AND BETAMETHASONE DIPROPIONATE 10; .64 MG/G; MG/G
CREAM TOPICAL
Qty: 30 G | Refills: 1 | Status: CANCELLED | OUTPATIENT
Start: 2021-04-22

## 2021-04-22 NOTE — PROGRESS NOTES
Nicko Headley is a 60 y.o. male who was seen by synchronous (real-time) audio-video technology on 4/22/2021 for Transfer Of Care and Back Pain    Patient is accompanied by his wife during his visit  He is establish care with a new provider    He is due for Medicare wellness he was scheduled next visit    Blood sugar has been lately uncontrolled he has been eating too much sweets sugar and drinking soda  , Patient has not been physically active due to his chronic low back pain he had a history of surgery        Assessment & Plan:   Diagnoses and all orders for this visit:    1. Type 2 diabetes mellitus without complication, without long-term current use of insulin ([facility] [address] discussed with patient and his wife regarding lifestyle modifications and    Following diabetic diet    He already has been to classes at dietitian in the past  But patient is stating that he will be working on decreasing sugar and carbohydrate intake  -     METABOLIC PANEL, COMPREHENSIVE; Future  -     HEMOGLOBIN A1C W/O EAG; Future    2. Chronic bilateral low back pain without sciatica    3. Essential hypertension  -     METABOLIC PANEL, COMPREHENSIVE; Future    4. Hypertensive left ventricular hypertrophy, without heart failure  Consider getting echocardiogram  5. Mixed hyperlipidemia  Patient is adherent to simvastatin 20 daily  6. Postconcussion syndrome  Patient has chronic headache he is on amitriptyline        712  Subjective:       Prior to Admission medications    Medication Sig Start Date End Date Taking?  Authorizing Provider   tiZANidine (ZANAFLEX) 4 mg tablet TAKE 1 TABLET EVERY 12 HOURS AS NEEDED FOR BACK PAIN, MUSCLE SPASMS 12/29/20  Yes Erin Pa MD   lisinopriL (PRINIVIL, ZESTRIL) 10 mg tablet TAKE 1 TABLET EVERY DAY (TAKE WITH 20 MG TABLET) 11/18/20  Yes Erin Pa MD   lisinopriL (PRINIVIL, ZESTRIL) 20 mg tablet TAKE 1 TABLET ONCE DAILY (TOTAL DAILY DOSE 30 MG) 11/18/20  Yes Karina Mora MD   hydroCHLOROthiazide (HYDRODIURIL) 12.5 mg tablet TAKE 1 TABLET EVERY DAY 11/18/20  Yes Erin Pa MD   alcohol swabs (BD Single Use Swabs Regular) padm USE EVERY DAY 9/4/20  Yes Karina Mora MD   metFORMIN ER (GLUCOPHAGE XR) 750 mg tablet TAKE 1 TABLET EVERY DAY 6/29/20  Yes Erin Pa MD   amitriptyline (ELAVIL) 25 mg tablet Take 1 Tab by mouth nightly. 6/29/20  Yes Karina Mora MD   rosuvastatin (CRESTOR) 20 mg tablet take 1 tablet by mouth at bedtime 6/29/20  Yes Karina Mora MD   Lancing Device with Lancets (Accu-Chek Multiclix Lancet) kit E11.65 - BG testing daily and PRN 6/4/20  Yes Karina Mora MD   lancets (Accu-Chek Fastclix Lancet Drum) misc Test BS daily    Dx code Y34.99 3/12/20  Yes Karina Mora MD   lancets (Accu-Chek Softclix Lancets) misc Use to check  BS daily   Dx code E11.65 3/12/20  Yes Karina Mora MD   Blood Glucose Control High&Low (ACCU-CHEK TRISTA CONTROL SOLN) soln Use as directed 1/29/20  Yes Karina Mora MD   Blood-Glucose Meter (ACCU-CHEK TRISTA CONNECT METER) misc E11.65 - BG testing daily and PRN 1/29/20  Yes Karina Mora MD   glucose blood VI test strips (ACCU-CHEK TRISTA PLUS TEST STRP) strip Test BS daily and PRN   Dx code E11.65 1/29/20  Yes Karina Mora MD   acetaminophen (TYLENOL) 325 mg tablet Take 650 mg by mouth every four (4) hours as needed for Pain. Yes Provider, Historical   clotrimazole-betamethasone (LOTRISONE) topical cream apply to affected area twice a day 12/6/18  Yes Blanca Gonzáles MD   Omeprazole delayed release (PRILOSEC D/R) 20 mg tablet Take 1 Tab by mouth two (2) times a day. Indications: HELICOBACTER PYLORI GASTRITIS 11/28/16  Yes Blanca Gonzáles MD   ondansetron [facility] ODT) 4 mg disintegrating tablet Take 1 Tab by mouth every eight (8) hours as needed for Nausea.  1/8/16   Blanca Gonzáles MD     Patient Active Problem List   Diagnosis Code    Essential hypertension I10    Chronic bilateral low back pain without sciatica M54.5, G89.29    Mixed hyperlipidemia E78.2    Headache disorder R51.9    Influenza vaccination declined by patient Z35.24    History of peptic ulcer Z87.11    Postconcussion syndrome F07.81    Hypertensive left ventricular hypertrophy, without heart failure I11.9    Type 2 diabetes mellitus without complication, without long-term current use of insulin ([facility]) E11.9     Patient Active Problem List    Diagnosis Date Noted    History of peptic ulcer 01/23/2020    Postconcussion syndrome 01/23/2020    Hypertensive left ventricular hypertrophy, without heart failure 01/23/2020    Type 2 diabetes mellitus without complication, without long-term current use of insulin [facility] 01/23/2020    Influenza vaccination declined by patient 12/06/2018    Headache disorder 12/25/2017    Mixed hyperlipidemia 06/08/2013    Essential hypertension     Chronic bilateral low back pain without sciatica      Current Outpatient Medications   Medication Sig Dispense Refill    tiZANidine (ZANAFLEX) 4 mg tablet TAKE 1 TABLET EVERY 12 HOURS AS NEEDED FOR BACK PAIN, MUSCLE SPASMS 90 Tab 0    lisinopriL (PRINIVIL, ZESTRIL) 10 mg tablet TAKE 1 TABLET EVERY DAY (TAKE WITH 20 MG TABLET) 90 Tab 1    lisinopriL (PRINIVIL, ZESTRIL) 20 mg tablet TAKE 1 TABLET ONCE DAILY (TOTAL DAILY DOSE 30 MG) 90 Tab 1    hydroCHLOROthiazide (HYDRODIURIL) 12.5 mg tablet TAKE 1 TABLET EVERY DAY 90 Tab 1    alcohol swabs (BD Single Use Swabs Regular) padm USE EVERY  Pad 3    metFORMIN ER (GLUCOPHAGE XR) 750 mg tablet TAKE 1 TABLET EVERY DAY 90 Tab 1    amitriptyline (ELAVIL) 25 mg tablet Take 1 Tab by mouth nightly.  90 Tab 3    rosuvastatin (CRESTOR) 20 mg tablet take 1 tablet by mouth at bedtime 90 Tab 3    Lancing Device with Lancets (Accu-Chek Multiclix Lancet) kit E11.65 - BG testing daily and PRN 1 Kit 0    lancets (Accu-Chek Fastclix Lancet Drum) misc Test BS daily    Dx code E11.65 100 Each 3    lancets (Accu-Chek Softclix Lancets) misc Use to check BS daily   Dx code E11.65 100 Each 1    Blood Glucose Control High&Low (ACCU-CHEK TRISTA CONTROL SOLN) soln Use as directed 1 Bottle 0    Blood-Glucose Meter (ACCU-CHEK TRISTA CONNECT METER) misc E11.65 - BG testing daily and PRN 1 Each 0    glucose blood VI test strips (ACCU-CHEK TRISTA PLUS TEST STRP) strip Test BS daily and PRN   Dx code E11.65 100 Strip 0    acetaminophen (TYLENOL) 325 mg tablet Take 650 mg by mouth every four (4) hours as needed for Pain.  clotrimazole-betamethasone (LOTRISONE) topical cream apply to affected area twice a day 30 g 1    Omeprazole delayed release (PRILOSEC D/R) 20 mg tablet Take 1 Tab by mouth two (2) times a day. Indications: HELICOBACTER PYLORI GASTRITIS 28 Tab 0    ondansetron (ZOFRAN ODT) 4 mg disintegrating tablet Take 1 Tab by mouth every eight (8) hours as needed for Nausea.  40 Tab 1     No Known Allergies  Past Medical History:   Diagnosis Date    Allergic rhinitis due to pollen    [place] Samples Diverticulosis Jan 2016    Colonoscopy - Dr Knox Sine hypertension     Helicobacter pylori gastritis 7/22/2015    Clarithromycin, Amoxil, and Omeprazole BID x 14 days prescribed    Hemorrhoid Jan 2016    Colonoscopy - Dr Estrada Sensor Herniated disc     per pt multiple levels    History of lumbar fusion 1/8/2016    Influenza vaccination declined by patient - 12/26/17 12/26/2017    Noncompliance with treatment plan     Pancreatitis     PUD (peptic ulcer disease) Jan 2016    No H Pylori on biopsy, multiple small ulcers on EGD (Dr. Clay Cardozo)    Scleral icterus 7/9/2015     Past Surgical History:   Procedure Laterality Date    HX COLONOSCOPY N/A Jan 2016    Dr. Clay Cardozo - hemorrhoids and diverticulosis, 10 year interval    HX ENDOSCOPY N/A Jan 2016    Dr. Clay Cardozo - multiple small ulcers, neg H pylori    HX LUMBAR FUSION       Family History   Problem Relation Age of Onset    Diabetes Mother     Hypertension Father     Stroke Sister     Diabetes Sister      Social History     Tobacco Use    Smoking status: Former Smoker     Types: Cigars    Smokeless tobacco: Never Used   Substance Use Topics    Alcohol use: Not Currently     Alcohol/week: 2.5 standard drinks     Types: 3 Standard drinks or equivalent per week     Comment: 5/2019 stopped drinking a few months ago and lost 30#s       Review of Systems   Constitutional: Negative for chills, fever, malaise/fatigue and weight loss. HENT: Negative for congestion, ear discharge, ear pain, hearing loss, nosebleeds, sinus pain and sore throat. Eyes: Negative for blurred vision, double vision and discharge. Respiratory: Negative for cough, hemoptysis, sputum production, shortness of breath and wheezing. Cardiovascular: Negative for chest pain, palpitations, claudication and leg swelling. Gastrointestinal: Negative for abdominal pain, constipation, diarrhea, nausea and vomiting. Genitourinary: Negative for dysuria, frequency and urgency. Musculoskeletal: Positive for back pain. Negative for myalgias. Skin: Negative for itching and rash. Neurological: Negative for dizziness, tingling, sensory change, speech change, focal weakness, weakness and headaches. Psychiatric/Behavioral: Negative for depression and suicidal ideas. Objective:   No flowsheet data found. General: alert, cooperative, no distress   Mental  status: normal mood, behavior, speech, dress, motor activity, and thought processes, able to follow commands   HENT: NCAT   Neck: no visualized mass   Resp: no respiratory distress   Neuro: no gross deficits   Skin: no discoloration or lesions of concern on visible areas   Psychiatric: normal affect, consistent with stated mood, no evidence of hallucinations     Additional exam findings: We discussed the expected course, resolution and complications of the diagnosis(es) in detail. Medication risks, benefits, costs, interactions, and alternatives were discussed as indicated.   I advised him to contact the office if his condition worsens, changes or fails to improve as anticipated. He expressed understanding with the diagnosis(es) and plan. Joelle Blas, was evaluated through a synchronous (real-time) audio-video encounter. The patient (or guardian if applicable) is aware that this is a billable service. Verbal consent to proceed has been obtained within the past 12 months. The visit was conducted pursuant to the emergency declaration under the [address] [place] [address] and the [employer] and [employer] Act. Patient identification was verified, and a caregiver was present when appropriate. The patient was located in a state where the provider was credentialed to provide care.     Óscar Lewis MD

## 2021-04-22 NOTE — PROGRESS NOTES
Braeden Islas is a 60 y.o. male (: 1965) presenting to address:    Chief Complaint   Patient presents with    Transfer Of Care    Back Pain       Vitals:    21 1500   Weight: 210 lb (95.3 kg)   Height: 6' 1\" (1.854 m)       Is someone accompanying this pt? NO    Is the patient using any DME equipment during OV? NO    Hearing/Vision:   No exam data present    Learning Assessment:     Learning Assessment 2015   PRIMARY LEARNER Patient   HIGHEST LEVEL OF EDUCATION - PRIMARY LEARNER  GRADUATED HIGH SCHOOL OR GED   BARRIERS PRIMARY LEARNER NONE   CO-LEARNER CAREGIVER No   PRIMARY LANGUAGE ENGLISH   LEARNER PREFERENCE PRIMARY OTHER (COMMENT)   ANSWERED BY patient   RELATIONSHIP SELF     Depression Screening:     3 most recent PHQ Screens 2021   Little interest or pleasure in doing things Not at all   Feeling down, depressed, irritable, or hopeless Not at all   Total Score PHQ 2 0     Fall Risk Assessment:     Fall Risk Assessment, last 12 mths 2019   Able to walk? Yes   Fall in past 12 months? No     Coordination of Care Questionaire:   1. Have you been to the ER, urgent care clinic since your last visit? Hospitalized since your last visit? NO    2. Have you seen or consulted any other health care providers outside of the [address] [place] [address] since your last visit? Include any pap smears or colon screening. NO    Advanced Directive:   1. Do you have an Advanced Directive? NO    2. Would you like information on Advanced Directives?  NO

## 2021-04-26 DIAGNOSIS — I10 ESSENTIAL HYPERTENSION: Chronic | ICD-10-CM

## 2021-04-27 RX ORDER — LISINOPRIL 20 MG/1
TABLET ORAL
Qty: 90 TAB | Refills: 1 | Status: CANCELLED | OUTPATIENT
Start: 2021-04-27

## 2021-04-27 RX ORDER — LISINOPRIL 30 MG/1
30 TABLET ORAL DAILY
Qty: 90 TAB | Refills: 1 | Status: SHIPPED | OUTPATIENT
Start: 2021-04-27 | End: 2021-09-02 | Stop reason: SDUPTHER

## 2021-04-27 RX ORDER — LISINOPRIL 10 MG/1
TABLET ORAL
Qty: 90 TAB | Refills: 1 | Status: CANCELLED | OUTPATIENT
Start: 2021-04-27

## 2021-06-16 DIAGNOSIS — E11.9 TYPE 2 DIABETES MELLITUS WITHOUT COMPLICATION, WITHOUT LONG-TERM CURRENT USE OF INSULIN (HCC): ICD-10-CM

## 2021-06-16 NOTE — TELEPHONE ENCOUNTER
Patient requesting refill for this medication to be sent to [facility] because  [facility] hasn't even started processing refill and she needs it sooner than [employer] can get it to her.

## 2021-06-17 RX ORDER — METFORMIN HYDROCHLORIDE 750 MG/1
TABLET, EXTENDED RELEASE ORAL
Qty: 30 TABLET | Refills: 0 | Status: SHIPPED | OUTPATIENT
Start: 2021-06-17 | End: 2021-07-06 | Stop reason: SDUPTHER

## 2021-06-17 NOTE — TELEPHONE ENCOUNTER
Patient would like a temporary supply of Metformin sent to the pharmacy until she get her Metformin from [facility] appointment was 04/2/2021  No future appointments. Last refill was 06/29/2020 qty of 90 with 1 refill.    It looks like she needs one sent to [facility] as well

## 2021-06-20 RX ORDER — TIZANIDINE 4 MG/1
TABLET ORAL
Qty: 90 TABLET | Refills: 0 | Status: SHIPPED | OUTPATIENT
Start: 2021-06-20 | End: 2021-08-23

## 2021-06-24 ENCOUNTER — TELEPHONE (OUTPATIENT)
Dept: FAMILY MEDICINE CLINIC | Age: 56
End: 2021-06-24

## 2021-06-24 NOTE — TELEPHONE ENCOUNTER
Received fax from [facility], stating that the pt was now using them as their pharmacy, called pt to confirm, Pt would like to keep using, [facility] because [address] take \" very long to deliver medications.

## 2021-07-06 DIAGNOSIS — E11.9 TYPE 2 DIABETES MELLITUS WITHOUT COMPLICATION, WITHOUT LONG-TERM CURRENT USE OF INSULIN (HCC): ICD-10-CM

## 2021-07-06 RX ORDER — METFORMIN HYDROCHLORIDE 750 MG/1
TABLET, EXTENDED RELEASE ORAL
Qty: 90 TABLET | Refills: 0 | Status: SHIPPED | OUTPATIENT
Start: 2021-07-06 | End: 2021-07-09 | Stop reason: SDUPTHER

## 2021-07-06 NOTE — TELEPHONE ENCOUNTER
Lien [facility] is requesting a refill on patient Metformin 750mg. No future appointments. Last refill was 06/17/2021 with 0 refills. Last appointment was 04/22/2021.

## 2021-07-09 DIAGNOSIS — E11.9 TYPE 2 DIABETES MELLITUS WITHOUT COMPLICATION, WITHOUT LONG-TERM CURRENT USE OF INSULIN (HCC): ICD-10-CM

## 2021-07-09 RX ORDER — METFORMIN HYDROCHLORIDE 750 MG/1
TABLET, EXTENDED RELEASE ORAL
Qty: 14 TABLET | Refills: 0 | Status: SHIPPED | OUTPATIENT
Start: 2021-07-09 | End: 2021-08-22 | Stop reason: SDUPTHER

## 2021-07-09 NOTE — TELEPHONE ENCOUNTER
Patient would like to know if she can get a short term supply due to [address] won't be here in time by the time she runs out. She would like to send it to the [facility].

## 2021-07-22 DIAGNOSIS — E11.9 TYPE 2 DIABETES MELLITUS WITHOUT COMPLICATION, WITHOUT LONG-TERM CURRENT USE OF INSULIN (HCC): ICD-10-CM

## 2021-07-25 RX ORDER — METFORMIN HYDROCHLORIDE 750 MG/1
TABLET, EXTENDED RELEASE ORAL
Qty: 14 TABLET | Refills: 0 | OUTPATIENT
Start: 2021-07-25

## 2021-08-02 DIAGNOSIS — E78.2 MIXED HYPERLIPIDEMIA: Chronic | ICD-10-CM

## 2021-08-02 NOTE — TELEPHONE ENCOUNTER
Lien [address] is requesting a refill on Rosuvastatin 20mg   No future appointments.   Last appointment was 04/22/2021  Last refill was 06/29/2020 qty of 90 with 3 refills

## 2021-08-04 RX ORDER — ROSUVASTATIN CALCIUM 20 MG/1
TABLET, COATED ORAL
Qty: 90 TABLET | Refills: 0 | Status: SHIPPED | OUTPATIENT
Start: 2021-08-04 | End: 2021-10-18

## 2021-08-09 ENCOUNTER — OFFICE VISIT (OUTPATIENT)
Dept: FAMILY MEDICINE CLINIC | Age: 56
End: 2021-08-09
Payer: MEDICARE

## 2021-08-09 VITALS
HEART RATE: 96 BPM | TEMPERATURE: 97.8 F | HEIGHT: 73 IN | RESPIRATION RATE: 16 BRPM | DIASTOLIC BLOOD PRESSURE: 71 MMHG | SYSTOLIC BLOOD PRESSURE: 103 MMHG | WEIGHT: 194 LBS | OXYGEN SATURATION: 100 % | BODY MASS INDEX: 25.71 KG/M2

## 2021-08-09 DIAGNOSIS — G89.29 CHRONIC BILATERAL LOW BACK PAIN WITHOUT SCIATICA: ICD-10-CM

## 2021-08-09 DIAGNOSIS — M54.50 CHRONIC BILATERAL LOW BACK PAIN WITHOUT SCIATICA: ICD-10-CM

## 2021-08-09 DIAGNOSIS — E11.65 UNCONTROLLED TYPE 2 DIABETES MELLITUS WITH HYPERGLYCEMIA (HCC): ICD-10-CM

## 2021-08-09 DIAGNOSIS — F07.81 POSTCONCUSSION SYNDROME: ICD-10-CM

## 2021-08-09 DIAGNOSIS — E11.9 TYPE 2 DIABETES MELLITUS WITHOUT COMPLICATION, WITHOUT LONG-TERM CURRENT USE OF INSULIN (HCC): ICD-10-CM

## 2021-08-09 DIAGNOSIS — I10 ESSENTIAL HYPERTENSION: ICD-10-CM

## 2021-08-09 DIAGNOSIS — M54.41 ACUTE BILATERAL LOW BACK PAIN WITH BILATERAL SCIATICA: ICD-10-CM

## 2021-08-09 DIAGNOSIS — E78.2 MIXED HYPERLIPIDEMIA: Primary | ICD-10-CM

## 2021-08-09 DIAGNOSIS — R29.898 WEAKNESS OF BOTH LEGS: ICD-10-CM

## 2021-08-09 DIAGNOSIS — M54.42 ACUTE BILATERAL LOW BACK PAIN WITH BILATERAL SCIATICA: ICD-10-CM

## 2021-08-09 PROCEDURE — G8752 SYS BP LESS 140: HCPCS | Performed by: LEGAL MEDICINE

## 2021-08-09 PROCEDURE — G8419 CALC BMI OUT NRM PARAM NOF/U: HCPCS | Performed by: LEGAL MEDICINE

## 2021-08-09 PROCEDURE — G8754 DIAS BP LESS 90: HCPCS | Performed by: LEGAL MEDICINE

## 2021-08-09 PROCEDURE — 3046F HEMOGLOBIN A1C LEVEL >9.0%: CPT | Performed by: LEGAL MEDICINE

## 2021-08-09 PROCEDURE — 3017F COLORECTAL CA SCREEN DOC REV: CPT | Performed by: LEGAL MEDICINE

## 2021-08-09 PROCEDURE — G8432 DEP SCR NOT DOC, RNG: HCPCS | Performed by: LEGAL MEDICINE

## 2021-08-09 PROCEDURE — 99214 OFFICE O/P EST MOD 30 MIN: CPT | Performed by: LEGAL MEDICINE

## 2021-08-09 PROCEDURE — G8427 DOCREV CUR MEDS BY ELIG CLIN: HCPCS | Performed by: LEGAL MEDICINE

## 2021-08-09 PROCEDURE — 2022F DILAT RTA XM EVC RTNOPTHY: CPT | Performed by: LEGAL MEDICINE

## 2021-08-09 RX ORDER — OXYCODONE AND ACETAMINOPHEN 5; 325 MG/1; MG/1
TABLET ORAL
COMMUNITY
Start: 2021-07-31 | End: 2022-05-16
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
## 2021-08-13 DIAGNOSIS — I10 ESSENTIAL HYPERTENSION: Chronic | ICD-10-CM

## 2021-08-13 NOTE — TELEPHONE ENCOUNTER
Requested Prescriptions     Pending Prescriptions Disp Refills    hydroCHLOROthiazide (HYDRODIURIL) 12.5 mg tablet 90 Tablet 1     Pt's wife called to request a refill because he is completely out. Please advise. No future appointments.

## 2021-08-15 RX ORDER — HYDROCHLOROTHIAZIDE 12.5 MG/1
TABLET ORAL
Qty: 90 TABLET | Refills: 1 | Status: SHIPPED | OUTPATIENT
Start: 2021-08-15 | End: 2021-09-02 | Stop reason: SDUPTHER

## 2021-08-22 DIAGNOSIS — E11.9 TYPE 2 DIABETES MELLITUS WITHOUT COMPLICATION, WITHOUT LONG-TERM CURRENT USE OF INSULIN (HCC): ICD-10-CM

## 2021-08-23 RX ORDER — TIZANIDINE 4 MG/1
TABLET ORAL
Qty: 90 TABLET | Refills: 0 | Status: SHIPPED | OUTPATIENT
Start: 2021-08-23 | End: 2021-10-11

## 2021-08-23 RX ORDER — METFORMIN HYDROCHLORIDE 750 MG/1
TABLET, EXTENDED RELEASE ORAL
Qty: 30 TABLET | Refills: 0 | Status: SHIPPED | OUTPATIENT
Start: 2021-08-23 | End: 2021-09-02 | Stop reason: SDUPTHER

## 2021-08-23 RX ORDER — METFORMIN HYDROCHLORIDE 750 MG/1
TABLET, EXTENDED RELEASE ORAL
Qty: 14 TABLET | Refills: 0 | OUTPATIENT
Start: 2021-08-23

## 2021-08-24 NOTE — TELEPHONE ENCOUNTER
Pt scheduled for labs    Future Appointments   Date Time Provider Rosalind Phuong   8/26/2021  7:50 AM [facility] BS AMB

## 2021-08-26 ENCOUNTER — HOSPITAL ENCOUNTER (OUTPATIENT)
Dept: LAB | Age: 56
Discharge: HOME | End: 2021-08-26
Payer: MEDICARE

## 2021-08-26 ENCOUNTER — APPOINTMENT (OUTPATIENT)
Dept: FAMILY MEDICINE CLINIC | Age: 56
End: 2021-08-26

## 2021-08-26 DIAGNOSIS — E78.2 MIXED HYPERLIPIDEMIA: ICD-10-CM

## 2021-08-26 DIAGNOSIS — I10 ESSENTIAL HYPERTENSION: ICD-10-CM

## 2021-08-26 DIAGNOSIS — E11.65 UNCONTROLLED TYPE 2 DIABETES MELLITUS WITH HYPERGLYCEMIA (HCC): ICD-10-CM

## 2021-08-26 LAB
ALBUMIN SERPL-MCNC: 3.7 G/DL (ref 3.4–5)
ALBUMIN/GLOB SERPL: 1.1 {RATIO} (ref 0.8–1.7)
ALP SERPL-CCNC: 109 U/L (ref 45–117)
ALT SERPL-CCNC: 34 U/L (ref 16–61)
ANION GAP SERPL CALC-SCNC: 8 MMOL/L (ref 3–18)
AST SERPL-CCNC: 17 U/L (ref 10–38)
BILIRUB SERPL-MCNC: 0.5 MG/DL (ref 0.2–1)
BUN SERPL-MCNC: 16 MG/DL (ref 7–18)
BUN/CREAT SERPL: 14 (ref 12–20)
CALCIUM SERPL-MCNC: 8.7 MG/DL (ref 8.5–10.1)
CHLORIDE SERPL-SCNC: 97 MMOL/L (ref 100–111)
CHOLEST SERPL-MCNC: 93 MG/DL
CO2 SERPL-SCNC: 27 MMOL/L (ref 21–32)
CREAT SERPL-MCNC: 1.15 MG/DL (ref 0.6–1.3)
EST. AVERAGE GLUCOSE BLD GHB EST-MCNC: ABNORMAL MG/DL
GLOBULIN SER CALC-MCNC: 3.5 G/DL (ref 2–4)
GLUCOSE SERPL-MCNC: 340 MG/DL (ref 74–99)
HBA1C MFR BLD: >14 % (ref 4.2–5.6)
HDLC SERPL-MCNC: 50 MG/DL (ref 40–60)
HDLC SERPL: 1.9 {RATIO} (ref 0–5)
LDLC SERPL CALC-MCNC: 14.6 MG/DL (ref 0–100)
LIPID PROFILE,FLP: NORMAL
POTASSIUM SERPL-SCNC: 3.8 MMOL/L (ref 3.5–5.5)
PROT SERPL-MCNC: 7.2 G/DL (ref 6.4–8.2)
SODIUM SERPL-SCNC: 132 MMOL/L (ref 136–145)
TRIGL SERPL-MCNC: 142 MG/DL (ref ?–150)
VLDLC SERPL CALC-MCNC: 28.4 MG/DL

## 2021-08-26 PROCEDURE — 80053 COMPREHEN METABOLIC PANEL: CPT

## 2021-08-26 PROCEDURE — 83036 HEMOGLOBIN GLYCOSYLATED A1C: CPT

## 2021-08-26 PROCEDURE — 36415 COLL VENOUS BLD VENIPUNCTURE: CPT

## 2021-08-26 PROCEDURE — 80061 LIPID PANEL: CPT

## 2021-08-31 DIAGNOSIS — G44.52 NEW DAILY PERSISTENT HEADACHE: ICD-10-CM

## 2021-08-31 RX ORDER — AMITRIPTYLINE HYDROCHLORIDE 25 MG/1
25 TABLET, FILM COATED ORAL
Qty: 90 TABLET | Refills: 3 | Status: SHIPPED | OUTPATIENT
Start: 2021-08-31 | End: 2022-06-23

## 2021-08-31 NOTE — TELEPHONE ENCOUNTER
Future Appointments   Date Time Provider Rosalind Baca   9/2/2021  9:45 AM Rancho Ayala MD [facility] BS AMB   9/30/2021  9:30 AM Wero Roque MD [facility] BS AMB     Last written 6/29/2020. #90/3.

## 2021-09-02 ENCOUNTER — OFFICE VISIT (OUTPATIENT)
Dept: FAMILY MEDICINE CLINIC | Age: 56
End: 2021-09-02
Payer: MEDICARE

## 2021-09-02 VITALS
BODY MASS INDEX: 25.98 KG/M2 | HEIGHT: 73 IN | HEART RATE: 74 BPM | OXYGEN SATURATION: 100 % | RESPIRATION RATE: 18 BRPM | WEIGHT: 196 LBS | SYSTOLIC BLOOD PRESSURE: 97 MMHG | DIASTOLIC BLOOD PRESSURE: 65 MMHG | TEMPERATURE: 97.5 F

## 2021-09-02 DIAGNOSIS — E78.2 MIXED HYPERLIPIDEMIA: Chronic | ICD-10-CM

## 2021-09-02 DIAGNOSIS — E11.65 UNCONTROLLED TYPE 2 DIABETES MELLITUS WITH HYPERGLYCEMIA (HCC): Chronic | ICD-10-CM

## 2021-09-02 DIAGNOSIS — I10 ESSENTIAL HYPERTENSION: Chronic | ICD-10-CM

## 2021-09-02 DIAGNOSIS — E11.65 UNCONTROLLED TYPE 2 DIABETES MELLITUS WITH HYPERGLYCEMIA (HCC): Primary | Chronic | ICD-10-CM

## 2021-09-02 LAB — GLUCOSE POC: 441 MG/DL

## 2021-09-02 PROCEDURE — 99214 OFFICE O/P EST MOD 30 MIN: CPT | Performed by: LEGAL MEDICINE

## 2021-09-02 PROCEDURE — 3017F COLORECTAL CA SCREEN DOC REV: CPT | Performed by: LEGAL MEDICINE

## 2021-09-02 PROCEDURE — G8752 SYS BP LESS 140: HCPCS | Performed by: LEGAL MEDICINE

## 2021-09-02 PROCEDURE — G8754 DIAS BP LESS 90: HCPCS | Performed by: LEGAL MEDICINE

## 2021-09-02 PROCEDURE — G8427 DOCREV CUR MEDS BY ELIG CLIN: HCPCS | Performed by: LEGAL MEDICINE

## 2021-09-02 PROCEDURE — G8419 CALC BMI OUT NRM PARAM NOF/U: HCPCS | Performed by: LEGAL MEDICINE

## 2021-09-02 PROCEDURE — 3046F HEMOGLOBIN A1C LEVEL >9.0%: CPT | Performed by: LEGAL MEDICINE

## 2021-09-02 PROCEDURE — G8510 SCR DEP NEG, NO PLAN REQD: HCPCS | Performed by: LEGAL MEDICINE

## 2021-09-02 PROCEDURE — 82962 GLUCOSE BLOOD TEST: CPT | Performed by: LEGAL MEDICINE

## 2021-09-02 PROCEDURE — 2022F DILAT RTA XM EVC RTNOPTHY: CPT | Performed by: LEGAL MEDICINE

## 2021-09-02 RX ORDER — METFORMIN HYDROCHLORIDE 750 MG/1
TABLET, EXTENDED RELEASE ORAL
Qty: 30 TABLET | Refills: 0 | Status: SHIPPED | OUTPATIENT
Start: 2021-09-02 | End: 2021-09-27

## 2021-09-02 RX ORDER — LISINOPRIL 30 MG/1
30 TABLET ORAL DAILY
Qty: 90 TABLET | Refills: 1 | Status: SHIPPED | OUTPATIENT
Start: 2021-09-02 | End: 2022-02-14

## 2021-09-02 RX ORDER — HYDROCHLOROTHIAZIDE 12.5 MG/1
TABLET ORAL
Qty: 90 TABLET | Refills: 1 | Status: SHIPPED | OUTPATIENT
Start: 2021-09-02 | End: 2022-06-24 | Stop reason: SDUPTHER

## 2021-09-02 RX ORDER — GLIMEPIRIDE 2 MG/1
2 TABLET ORAL
Qty: 90 TABLET | Refills: 0 | Status: SHIPPED | OUTPATIENT
Start: 2021-09-02 | End: 2021-11-17

## 2021-09-02 NOTE — PROGRESS NOTES
Shan Rascon is a 60 y.o. male (: 1965) presenting to address:    Chief Complaint   Patient presents with    Medication Evaluation     follow up       Vitals:    21 0933   BP: 97/65   Pulse: 74   Resp: 18   Temp: 97.5 °F (36.4 °C)   TempSrc: Temporal   SpO2: 100%   Weight: 196 lb (88.9 kg)   Height: 6' 1\" (1.854 m)       Hearing/Vision:   No exam data present    Learning Assessment:     Learning Assessment 2015   PRIMARY LEARNER Patient   HIGHEST LEVEL OF EDUCATION - PRIMARY LEARNER  GRADUATED HIGH SCHOOL OR GED   BARRIERS PRIMARY LEARNER NONE   CO-LEARNER CAREGIVER No   PRIMARY LANGUAGE ENGLISH   LEARNER PREFERENCE PRIMARY OTHER (COMMENT)   ANSWERED BY patient   RELATIONSHIP SELF     Depression Screening:     3 most recent PHQ Screens 2021   Little interest or pleasure in doing things Not at all   Feeling down, depressed, irritable, or hopeless Not at all   Total Score PHQ 2 0     Fall Risk Assessment:     Fall Risk Assessment, last 12 mths 2021   Able to walk? Yes   Fall in past 12 months? 0   Do you feel unsteady? -   Are you worried about falling -   Is TUG test greater than 12 seconds? -   Is the gait abnormal? -   Number of falls in past 12 months -   Fall with injury? -     Abuse Screening:     Abuse Screening Questionnaire 2021   Do you ever feel afraid of your partner? N   Are you in a relationship with someone who physically or mentally threatens you? N   Is it safe for you to go home? Y     Coordination of Care Questionaire:   1. Have you been to the ER, urgent care clinic since your last visit? Hospitalized since your last visit? NO    2. Have you seen or consulted any other health care providers outside of the [address] [place] [address] since your last visit? Include any pap smears or colon screening. NO    Advanced Directive:   1. Do you have an Advanced Directive? NO    2. Would you like information on Advanced Directives?  NO

## 2021-09-02 NOTE — PROGRESS NOTES
Mane Lraose     Chief Complaint   Patient presents with    Medication Evaluation     follow up     Vitals:    09/02/21 0933   BP: 97/65   Pulse: 74   Resp: 18   Temp: 97.5 °F (36.4 °C)   TempSrc: Temporal   SpO2: 100%   Weight: 196 lb (88.9 kg)   Height: 6' 1\" (1.854 m)         HPI:Pateint is here for follow up he has uncontrolled diabetes     Past Medical History:   Diagnosis Date    Allergic rhinitis due to pollen    Bethena Lobstein Diverticulosis Jan 2016    Colonoscopy - Dr Daniel Mario hypertension     Helicobacter pylori gastritis 7/22/2015    Clarithromycin, Amoxil, and Omeprazole BID x 14 days prescribed    Hemorrhoid Jan 2016    Colonoscopy - Dr Juancho Pierrer Herniated disc     per pt multiple levels    History of lumbar fusion 1/8/2016    Influenza vaccination declined by patient - 12/26/17 12/26/2017    Noncompliance with treatment plan     Pancreatitis     PUD (peptic ulcer disease) Jan 2016    No H Pylori on biopsy, multiple small ulcers on EGD (Dr. William Segura)    Scleral icterus 7/9/2015     Past Surgical History:   Procedure Laterality Date    HX COLONOSCOPY N/A Jan 2016    Dr. William Segura - hemorrhoids and diverticulosis, 10 year interval    HX ENDOSCOPY N/A Jan 2016    Dr. William Segura - multiple small ulcers, neg H pylori    HX LUMBAR FUSION       Social History     Tobacco Use    Smoking status: Former Smoker     Types: Cigars    Smokeless tobacco: Never Used   Substance Use Topics    Alcohol use: Not Currently     Alcohol/week: 2.5 standard drinks     Types: 3 Standard drinks or equivalent per week     Comment: 5/2019 stopped drinking a few months ago and lost 30#s       Family History   Problem Relation Age of Onset    Diabetes Mother     Hypertension Father     Stroke Sister     Diabetes Sister        Review of Systems   Constitutional: Negative for chills, fever, malaise/fatigue and weight loss.    HENT: Negative for congestion, ear discharge, ear pain, hearing loss, nosebleeds, sinus pain and sore throat. Eyes: Negative for blurred vision, double vision and discharge. Respiratory: Negative for cough, sputum production, shortness of breath and wheezing. Cardiovascular: Negative for chest pain, palpitations, claudication and leg swelling. Gastrointestinal: Negative for abdominal pain, blood in stool, constipation, diarrhea, nausea and vomiting. Genitourinary: Negative for dysuria, frequency, hematuria and urgency. Musculoskeletal: Positive for back pain. Negative for myalgias and neck pain. Skin: Negative for itching and rash. Neurological: Positive for tingling and sensory change. Negative for dizziness, speech change, focal weakness, weakness and headaches. Psychiatric/Behavioral: Negative for depression, hallucinations, substance abuse and suicidal ideas. The patient is not nervous/anxious and does not have insomnia. He is sad mood        Physical Exam  Vitals and nursing note reviewed. Constitutional:       General: He is not in acute distress. Appearance: He is well-developed. He is not diaphoretic. HENT:      Head: Normocephalic and atraumatic. Eyes:      General: No scleral icterus. Right eye: No discharge. Left eye: No discharge. Neck:      Thyroid: No thyromegaly. Cardiovascular:      Rate and Rhythm: Normal rate and regular rhythm. Heart sounds: Normal heart sounds. Pulmonary:      Effort: Pulmonary effort is normal. No respiratory distress. Breath sounds: Normal breath sounds. No rales. Abdominal:      General: Bowel sounds are normal. There is no distension. Palpations: Abdomen is soft. There is no mass. Tenderness: There is no abdominal tenderness. There is no rebound. Musculoskeletal:         General: No tenderness or deformity. Normal range of motion. Cervical back: Normal range of motion and neck supple. Lymphadenopathy:      Cervical: No cervical adenopathy.    Skin:     General: Skin is warm and dry. Findings: No erythema or rash. Neurological:      Mental Status: He is alert and oriented to person, place, and time. Cranial Nerves: No cranial nerve deficit. Coordination: Coordination normal.   Psychiatric:         Thought Content: Thought content normal.         Judgment: Judgment normal.          Assessment and plan     Plan of care has been discussed with the patient, he agrees to the plan and verbalized understanding. All his questions were answered  More than 50% of the time spent in this visit was counseling the patient about  illness and treatment options         1. Essential hypertension  Well controlled on current medications   - hydroCHLOROthiazide (HYDRODIURIL) 12.5 mg tablet; TAKE 1 TABLET EVERY DAY  Dispense: 90 Tablet; Refill: 1  - lisinopriL (PRINIVIL, ZESTRIL) 30 mg tablet; Take 1 Tablet by mouth daily for 90 days. Dispense: 90 Tablet; Refill: 1    2. Uncontrolled type 2 diabetes mellitus with hyperglycemia ([facility] [address]    He declined to start on ANY injectable  treatment will add Amaryl 2 mg and he will adherent to low sugar intake he has been consuming sugary drink and soda       - metFORMIN ER (GLUCOPHAGE XR) 750 mg tablet; TAKE 1 TABLET EVERY DAY  Dispense: 30 Tablet; Refill: 0  - AMB POC GLUCOSE BLOOD, BY GLUCOSE MONITORING DEVICE  - REFERRAL TO OPHTHALMOLOGY  - glimepiride (AMARYL) 2 mg tablet; Take 1 Tablet by mouth every morning for 90 days. Dispense: 90 Tablet; Refill: 0    3. Mixed hyperlipidemia  He is adherent to rosuvastatin     Current Outpatient Medications   Medication Sig Dispense Refill    hydroCHLOROthiazide (HYDRODIURIL) 12.5 mg tablet TAKE 1 TABLET EVERY DAY 90 Tablet 1    metFORMIN ER (GLUCOPHAGE XR) 750 mg tablet TAKE 1 TABLET EVERY DAY 30 Tablet 0    lisinopriL (PRINIVIL, ZESTRIL) 30 mg tablet Take 1 Tablet by mouth daily for 90 days. 90 Tablet 1    glimepiride (AMARYL) 2 mg tablet Take 1 Tablet by mouth every morning for 90 days.  90 Tablet 0    amitriptyline (ELAVIL) 25 mg tablet Take 1 Tablet by mouth nightly. 90 Tablet 3    tiZANidine (ZANAFLEX) 4 mg tablet take 1 tablet by mouth every 12 hours if needed for muscle spasm AND BACK PAIN 90 Tablet 0    oxyCODONE-acetaminophen (PERCOCET) 5-325 mg per tablet TAKE 1 TABLET BY MOUTH EVERY 6 HOURS AS NEEDED FOR PAIN      rosuvastatin (CRESTOR) 20 mg tablet take 1 tablet by mouth at bedtime 90 Tablet 0    alcohol swabs (BD Single Use Swabs Regular) padm USE EVERY  Pad 3    Lancing Device with Lancets (Accu-Chek Multiclix Lancet) kit E11.65 - BG testing daily and PRN 1 Kit 0    lancets (Accu-Chek Fastclix Lancet Drum) misc Test BS daily    Dx code E11.65 100 Each 3    lancets (Accu-Chek Softclix Lancets) misc Use to check  BS daily   Dx code E11.65 100 Each 1    Blood Glucose Control High&Low (ACCU-CHEK TRISTA CONTROL SOLN) soln Use as directed 1 Bottle 0    Blood-Glucose Meter (ACCU-CHEK TRISTA CONNECT METER) [facility] [place] [place] E11.65 - BG testing daily and PRN 1 Each 0    glucose blood VI test strips (ACCU-CHEK TRISTA PLUS TEST STRP) strip Test BS daily and PRN   Dx code E11.65 100 Strip 0    acetaminophen (TYLENOL) 325 mg tablet Take 650 mg by mouth every four (4) hours as needed for Pain.  clotrimazole-betamethasone (LOTRISONE) topical cream apply to affected area twice a day 30 g 1    Omeprazole delayed release (PRILOSEC D/R) 20 mg tablet Take 1 Tab by mouth two (2) times a day. Indications: HELICOBACTER PYLORI GASTRITIS 28 Tab 0    ondansetron (ZOFRAN ODT) 4 mg disintegrating tablet Take 1 Tab by mouth every eight (8) hours as needed for Nausea.  40 Tab 1       Patient Active Problem List    Diagnosis Date Noted    History of peptic ulcer 01/23/2020    Postconcussion syndrome 01/23/2020    Hypertensive left ventricular hypertrophy, without heart failure 01/23/2020    Type 2 diabetes mellitus without complication, without long-term current use of insulin ([address] 01/23/2020    Influenza vaccination declined by patient 12/06/2018    Headache disorder 12/25/2017    Mixed hyperlipidemia 06/08/2013    Essential hypertension     Chronic bilateral low back pain without sciatica      Results for orders placed or performed in visit on 09/02/21   AMB POC GLUCOSE BLOOD, BY GLUCOSE MONITORING DEVICE   Result Value Ref Range    Glucose  MG/DL     Office Visit on 09/02/2021   Component Date Value Ref Range Status    Glucose POC 09/02/2021 441  MG/DL Final   Hospital Outpatient Visit on 08/26/2021   Component Date Value Ref Range Status    Hemoglobin A1c 08/26/2021 >14.0* 4.2 - 5.6 % Final    Comment: (NOTE)  HbA1C Interpretive Ranges  <5.7              Normal  5.7 - 6.4         Consider Prediabetes  >6.5              Consider Diabetes      Est. average glucose 08/26/2021 Cannot be calculated  mg/dL Final    Estimated average glucose results are not reported when the hemoglobin A1c is <3.8% or >14% since it has not been validated for values in these ranges.  LIPID PROFILE 08/26/2021        Final    Cholesterol, total 08/26/2021 93  <200 MG/DL Final    Triglyceride 08/26/2021 142  <150 MG/DL Final    Comment: The drugs N-acetylcysteine (NAC) and  Metamiszole have been found to cause falsely  low results in this chemical assay. Please  be sure to submit blood samples obtained  BEFORE administration of either of these  drugs to assure correct results.       HDL Cholesterol 08/26/2021 50  40 - 60 MG/DL Final    LDL, calculated 08/26/2021 14.6  0 - 100 MG/DL Final    VLDL, calculated 08/26/2021 28.4  MG/DL Final    CHOL/HDL Ratio 08/26/2021 1.9  0 - 5.0   Final    Sodium 08/26/2021 132* 136 - 145 mmol/L Final    Potassium 08/26/2021 3.8  3.5 - 5.5 mmol/L Final    Chloride 08/26/2021 97* 100 - 111 mmol/L Final    CO2 08/26/2021 27  21 - 32 mmol/L Final    Anion gap 08/26/2021 8  3.0 - 18 mmol/L Final    Glucose 08/26/2021 340* 74 - 99 mg/dL Final    BUN 08/26/2021 16  7.0 - 18 MG/DL Final    Creatinine 08/26/2021 1.15  0.6 - 1.3 MG/DL Final    BUN/Creatinine ratio 08/26/2021 14  12 - 20   Final    GFR est AA 08/26/2021 >60  >60 ml/min/1.73m2 Final    GFR est non-AA 08/26/2021 >60  >60 ml/min/1.73m2 Final    Comment: (NOTE)  Estimated GFR is calculated using the Modification of Diet in Renal   Disease (MDRD) Study equation, reported for both  Americans   (GFRAA) and non- Americans (GFRNA), and normalized to 1.73m2   body surface area. The physician must decide which value applies to   the patient. The MDRD study equation should only be used in   individuals age 25 or older. It has not been validated for the   following: pregnant women, patients with serious comorbid conditions,   or on certain medications, or persons with extremes of body size,   muscle mass, or nutritional status.  Calcium 08/26/2021 8.7  8.5 - 10.1 MG/DL Final    Bilirubin, total 08/26/2021 0.5  0.2 - 1.0 MG/DL Final    ALT (SGPT) 08/26/2021 34  16 - 61 U/L Final    AST (SGOT) 08/26/2021 17  10 - 38 U/L Final    Alk. phosphatase 08/26/2021 109  45 - 117 U/L Final    Protein, total 08/26/2021 7.2  6.4 - 8.2 g/dL Final    Albumin 08/26/2021 3.7  3.4 - 5.0 g/dL Final    Globulin 08/26/2021 3.5  2.0 - 4.0 g/dL Final    A-G Ratio 08/26/2021 1.1  0.8 - 1.7   Final          Follow-up and Dispositions    · Return in about 1 month (around 10/2/2021).

## 2021-09-04 RX ORDER — METFORMIN HYDROCHLORIDE 750 MG/1
TABLET, EXTENDED RELEASE ORAL
Qty: 90 TABLET | Refills: 1 | OUTPATIENT
Start: 2021-09-04

## 2021-09-04 RX ORDER — HYDROCHLOROTHIAZIDE 12.5 MG/1
TABLET ORAL
Qty: 90 TABLET | Refills: 1 | OUTPATIENT
Start: 2021-09-04

## 2021-09-04 RX ORDER — LISINOPRIL 30 MG/1
30 TABLET ORAL DAILY
Qty: 90 TABLET | Refills: 1 | OUTPATIENT
Start: 2021-09-04 | End: 2021-12-03

## 2021-09-08 ENCOUNTER — TELEPHONE (OUTPATIENT)
Dept: FAMILY MEDICINE CLINIC | Age: 56
End: 2021-09-08
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
## 2021-09-09 NOTE — TELEPHONE ENCOUNTER
Spoke to [facility], and she stated that patient need a referral for patient due to the insurance not covering his visit. His  is the one whom set up the appointment with Dr. Marion Evans office.  She understands if you don't want place the referral.

## 2021-09-10 ENCOUNTER — TELEPHONE (OUTPATIENT)
Dept: FAMILY MEDICINE CLINIC | Age: 56
End: 2021-09-10

## 2021-09-10 DIAGNOSIS — R29.898 WEAKNESS OF BOTH LEGS: ICD-10-CM

## 2021-09-10 DIAGNOSIS — M54.2 CERVICAL PAIN: ICD-10-CM

## 2021-09-10 DIAGNOSIS — M54.50 CHRONIC BILATERAL LOW BACK PAIN WITHOUT SCIATICA: Primary | ICD-10-CM

## 2021-09-10 DIAGNOSIS — M25.562 LEFT KNEE PAIN, UNSPECIFIED CHRONICITY: ICD-10-CM

## 2021-09-10 DIAGNOSIS — G89.29 CHRONIC BILATERAL LOW BACK PAIN WITHOUT SCIATICA: Primary | ICD-10-CM
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
## 2021-09-26 DIAGNOSIS — E11.65 UNCONTROLLED TYPE 2 DIABETES MELLITUS WITH HYPERGLYCEMIA (HCC): Chronic | ICD-10-CM

## 2021-09-27 RX ORDER — METFORMIN HYDROCHLORIDE 750 MG/1
TABLET, EXTENDED RELEASE ORAL
Qty: 30 TABLET | Refills: 0 | Status: SHIPPED | OUTPATIENT
Start: 2021-09-27 | End: 2021-10-06

## 2021-10-05 DIAGNOSIS — E11.65 UNCONTROLLED TYPE 2 DIABETES MELLITUS WITH HYPERGLYCEMIA (HCC): Chronic | ICD-10-CM

## 2021-10-06 RX ORDER — METFORMIN HYDROCHLORIDE 750 MG/1
TABLET, EXTENDED RELEASE ORAL
Qty: 90 TABLET | Refills: 0 | Status: SHIPPED | OUTPATIENT
Start: 2021-10-06 | End: 2021-12-21 | Stop reason: SDUPTHER

## 2021-10-08 ENCOUNTER — TELEPHONE (OUTPATIENT)
Dept: FAMILY MEDICINE CLINIC | Age: 56
End: 2021-10-08

## 2021-10-11 RX ORDER — TIZANIDINE 4 MG/1
TABLET ORAL
Qty: 60 TABLET | Refills: 1 | Status: SHIPPED | OUTPATIENT
Start: 2021-10-11 | End: 2022-01-03

## 2021-10-12 NOTE — TELEPHONE ENCOUNTER
Spoke w/ pt's wife she stated that he is seeing, @ [facility]  Dr Louise Aguayo,   [place], [address] [facility]   [phone]

## 2021-10-18 DIAGNOSIS — E78.2 MIXED HYPERLIPIDEMIA: Chronic | ICD-10-CM

## 2021-10-18 RX ORDER — ROSUVASTATIN CALCIUM 20 MG/1
TABLET, COATED ORAL
Qty: 90 TABLET | Refills: 0 | Status: SHIPPED | OUTPATIENT
Start: 2021-10-18 | End: 2022-01-03

## 2021-10-21 DIAGNOSIS — E11.65 UNCONTROLLED TYPE 2 DIABETES MELLITUS WITH HYPERGLYCEMIA (HCC): Chronic | ICD-10-CM

## 2021-10-21 RX ORDER — LANCING DEVICE/LANCETS
KIT MISCELLANEOUS
Qty: 1 KIT | Refills: 0 | Status: SHIPPED | OUTPATIENT
Start: 2021-10-21 | End: 2022-01-03

## 2021-10-21 RX ORDER — LANCETS
EACH MISCELLANEOUS
Qty: 100 EACH | Refills: 3 | Status: SHIPPED | OUTPATIENT
Start: 2021-10-21 | End: 2022-09-07

## 2021-10-21 NOTE — TELEPHONE ENCOUNTER
Last visit: 9/2/2021  Next visit:   Future Appointments   Date Time Provider Rosalind Jaquezi   11/5/2021  8:30 AM Chase Souza MD [facility]

## 2021-11-15 DIAGNOSIS — E11.65 UNCONTROLLED TYPE 2 DIABETES MELLITUS WITH HYPERGLYCEMIA (HCC): Chronic | ICD-10-CM

## 2021-11-17 RX ORDER — GLIMEPIRIDE 2 MG/1
TABLET ORAL
Qty: 90 TABLET | Refills: 0 | Status: SHIPPED | OUTPATIENT
Start: 2021-11-17 | End: 2021-12-22 | Stop reason: SDUPTHER

## 2021-12-09 ENCOUNTER — TELEPHONE (OUTPATIENT)
Dept: FAMILY MEDICINE CLINIC | Age: 56
End: 2021-12-09

## 2021-12-21 ENCOUNTER — OFFICE VISIT (OUTPATIENT)
Dept: FAMILY MEDICINE CLINIC | Age: 56
End: 2021-12-21
Payer: MEDICARE

## 2021-12-21 ENCOUNTER — APPOINTMENT (OUTPATIENT)
Dept: FAMILY MEDICINE CLINIC | Age: 56
End: 2021-12-21

## 2021-12-21 VITALS
WEIGHT: 222 LBS | RESPIRATION RATE: 15 BRPM | HEIGHT: 73 IN | DIASTOLIC BLOOD PRESSURE: 66 MMHG | OXYGEN SATURATION: 95 % | HEART RATE: 65 BPM | BODY MASS INDEX: 29.42 KG/M2 | SYSTOLIC BLOOD PRESSURE: 118 MMHG | TEMPERATURE: 97.2 F

## 2021-12-21 DIAGNOSIS — Z12.5 PROSTATE CANCER SCREENING: ICD-10-CM

## 2021-12-21 DIAGNOSIS — M54.50 CHRONIC BILATERAL LOW BACK PAIN WITHOUT SCIATICA: ICD-10-CM

## 2021-12-21 DIAGNOSIS — F07.81 POST CONCUSSIVE SYNDROME: ICD-10-CM

## 2021-12-21 DIAGNOSIS — E78.2 MIXED HYPERLIPIDEMIA: ICD-10-CM

## 2021-12-21 DIAGNOSIS — E11.65 UNCONTROLLED TYPE 2 DIABETES MELLITUS WITH HYPERGLYCEMIA (HCC): ICD-10-CM

## 2021-12-21 DIAGNOSIS — G44.52 NEW DAILY PERSISTENT HEADACHE: ICD-10-CM

## 2021-12-21 DIAGNOSIS — M62.08 DIASTASIS RECTI: ICD-10-CM

## 2021-12-21 DIAGNOSIS — E11.65 UNCONTROLLED TYPE 2 DIABETES MELLITUS WITH HYPERGLYCEMIA (HCC): Primary | ICD-10-CM

## 2021-12-21 DIAGNOSIS — G89.29 CHRONIC BILATERAL LOW BACK PAIN WITHOUT SCIATICA: ICD-10-CM

## 2021-12-21 LAB — HBA1C MFR BLD HPLC: 7.6 %

## 2021-12-21 PROCEDURE — G8752 SYS BP LESS 140: HCPCS | Performed by: LEGAL MEDICINE

## 2021-12-21 PROCEDURE — G8754 DIAS BP LESS 90: HCPCS | Performed by: LEGAL MEDICINE

## 2021-12-21 PROCEDURE — 2022F DILAT RTA XM EVC RTNOPTHY: CPT | Performed by: LEGAL MEDICINE

## 2021-12-21 PROCEDURE — 3017F COLORECTAL CA SCREEN DOC REV: CPT | Performed by: LEGAL MEDICINE

## 2021-12-21 PROCEDURE — G8427 DOCREV CUR MEDS BY ELIG CLIN: HCPCS | Performed by: LEGAL MEDICINE

## 2021-12-21 PROCEDURE — 3051F HG A1C>EQUAL 7.0%<8.0%: CPT | Performed by: LEGAL MEDICINE

## 2021-12-21 PROCEDURE — G8419 CALC BMI OUT NRM PARAM NOF/U: HCPCS | Performed by: LEGAL MEDICINE

## 2021-12-21 PROCEDURE — G8510 SCR DEP NEG, NO PLAN REQD: HCPCS | Performed by: LEGAL MEDICINE

## 2021-12-21 PROCEDURE — 99214 OFFICE O/P EST MOD 30 MIN: CPT | Performed by: LEGAL MEDICINE

## 2021-12-21 PROCEDURE — 83036 HEMOGLOBIN GLYCOSYLATED A1C: CPT | Performed by: LEGAL MEDICINE

## 2021-12-21 RX ORDER — METFORMIN HYDROCHLORIDE 750 MG/1
TABLET, EXTENDED RELEASE ORAL
Qty: 90 TABLET | Refills: 1 | Status: SHIPPED | OUTPATIENT
Start: 2021-12-21 | End: 2022-05-16 | Stop reason: DRUGHIGH

## 2021-12-21 NOTE — PROGRESS NOTES
Jasmina Hart is a 60 y.o. male (: 1965) presenting to address:    Chief Complaint   Patient presents with    Diabetes       Vitals:    21 1101   BP: 118/66   Pulse: 65   Resp: 15   Temp: 97.2 °F (36.2 °C)   TempSrc: Temporal   SpO2: 95%   Weight: 222 lb (100.7 kg)   Height: 6' 1\" (1.854 m)   PainSc:   0 - No pain       Hearing/Vision:   No exam data present    Learning Assessment:     Learning Assessment 2015   PRIMARY LEARNER Patient   HIGHEST LEVEL OF EDUCATION - PRIMARY LEARNER  GRADUATED HIGH SCHOOL OR GED   BARRIERS PRIMARY LEARNER NONE   CO-LEARNER CAREGIVER No   PRIMARY LANGUAGE ENGLISH   LEARNER PREFERENCE PRIMARY OTHER (COMMENT)   ANSWERED BY patient   RELATIONSHIP SELF     Depression Screening:     3 most recent PHQ Screens 2021   Little interest or pleasure in doing things Not at all   Feeling down, depressed, irritable, or hopeless Not at all   Total Score PHQ 2 0     Fall Risk Assessment:     Fall Risk Assessment, last 12 mths 2021   Able to walk? Yes   Fall in past 12 months? 0   Do you feel unsteady? -   Are you worried about falling -   Is TUG test greater than 12 seconds? -   Is the gait abnormal? -   Number of falls in past 12 months -   Fall with injury? -     Abuse Screening:     Abuse Screening Questionnaire 2021   Do you ever feel afraid of your partner? N   Are you in a relationship with someone who physically or mentally threatens you? N   Is it safe for you to go home?  Y     ADL Assessment:     ADL Assessment 2016   Feeding yourself No Help Needed   Getting from bed to chair No Help Needed   Getting dressed Help Needed   Bathing or showering No Help Needed   Walk across the room (includes cane/walker) No Help Needed   Using the telphone No Help Needed   Taking your medications No Help Needed   Preparing meals No Help Needed   Managing money (expenses/bills) No Help Needed   Moderately strenuous housework (laundry) Help Needed   Shopping for personal items (toiletries/medicines) Help Needed   Shopping for groceries Help Needed   Driving Help Needed   Climbing a flight of stairs No Help Needed   Getting to places beyond walking distances No Help Needed        Coordination of Care Questionaire:   1. \"Have you been to the ER, urgent care clinic since your last visit? Hospitalized since your last visit? \" No    2. \"Have you seen or consulted any other health care providers outside of the [address] since your last visit? \" [address]     3. For patients aged 39-70: Has the patient had a colonoscopy? Yes, HM satisfied with blue hyperlink     If the patient is female:    4. For patients aged 41-77: Has the patient had a mammogram within the past 2 years? NA based on age or sex    11. For patients aged 21-65: Has the patient had a pap smear? NA based on age or sex    Advanced Directive:   1. Do you have an Advanced Directive? YES    2. Would you like information on Advanced Directives? NO    Patient DECLINED flu vaccine.

## 2021-12-21 NOTE — PROGRESS NOTES
Denise Hicks     Chief Complaint   Patient presents with    Diabetes     Vitals:    12/21/21 1101   BP: 118/66   Pulse: 65   Resp: 15   Temp: 97.2 °F (36.2 °C)   TempSrc: Temporal   SpO2: 95%   Weight: 222 lb (100.7 kg)   Height: 6' 1\" (1.854 m)   PainSc:   0 - No pain         HPI:Mr. Colleen Asher  Is here for DM follow up , he has been doing better as far as diet , but he gained  26 lbs ??? He is not exercising, blood sugar reading has been stable it has not been above 130 or 40 as per his wife, he cut back on sugar and carbohydrates    HB a1c is 7.6 that is down from above 14 !!   Great job    Past Medical History:   Diagnosis Date    Allergic rhinitis due to pollen    [employer] Diverticulosis Jan 2016    Colonoscopy - Dr Leela Baeza hypertension     Helicobacter pylori gastritis 7/22/2015    Clarithromycin, Amoxil, and Omeprazole BID x 14 days prescribed    Hemorrhoid Jan 2016    Colonoscopy - Dr Giuseppe De Jesus Herniated disc     per pt multiple levels    History of lumbar fusion 1/8/2016    Influenza vaccination declined by patient - 12/26/17 12/26/2017    Noncompliance with treatment plan     Pancreatitis     PUD (peptic ulcer disease) Jan 2016    No H Pylori on biopsy, multiple small ulcers on EGD (Dr. Marc Blizzard)    Scleral icterus 7/9/2015     Past Surgical History:   Procedure Laterality Date    HX COLONOSCOPY N/A Jan 2016    Dr. Marc Blizzard - hemorrhoids and diverticulosis, 10 year interval    HX ENDOSCOPY N/A Jan 2016    Dr. Marc Blizzard - multiple small ulcers, neg H pylori    HX LUMBAR FUSION       Social History     Tobacco Use    Smoking status: Current Some Day Smoker     Types: Cigars    Smokeless tobacco: Never Used    Tobacco comment: Pt smokes Black & Milds sometimes   Substance Use Topics    Alcohol use: Not Currently     Alcohol/week: 2.5 standard drinks     Types: 3 Standard drinks or equivalent per week     Comment: 5/2019 stopped drinking a few months ago and lost 30#s       Family History   Problem Relation Age of Onset    Diabetes Mother     Hypertension Father     Stroke Sister     Diabetes Sister        Review of Systems   Constitutional: Negative for chills, fever, malaise/fatigue and weight loss. HENT: Negative for congestion, ear discharge, ear pain, hearing loss, nosebleeds, sinus pain and sore throat. Eyes: Negative for blurred vision, double vision and discharge. Respiratory: Negative for cough, hemoptysis, sputum production, shortness of breath and wheezing. Cardiovascular: Negative for chest pain, palpitations, claudication and leg swelling. Gastrointestinal: Negative for abdominal pain, blood in stool, constipation, diarrhea, melena, nausea and vomiting. Genitourinary: Negative for dysuria, flank pain, frequency, hematuria and urgency. Musculoskeletal: Positive for back pain, joint pain and myalgias. Negative for falls. Skin: Negative for itching and rash. Neurological: Negative for dizziness, tingling, sensory change, speech change, focal weakness, seizures, loss of consciousness, weakness and headaches. Psychiatric/Behavioral: Negative for depression, memory loss and suicidal ideas. The patient is not nervous/anxious and does not have insomnia. Physical Exam  Vitals and nursing note reviewed. Constitutional:       General: He is not in acute distress. Appearance: Normal appearance. He is well-developed. He is not toxic-appearing or diaphoretic. HENT:      Head: Normocephalic and atraumatic. Eyes:      General: No scleral icterus. Right eye: No discharge. Left eye: No discharge. Neck:      Thyroid: No thyromegaly. Cardiovascular:      Rate and Rhythm: Normal rate and regular rhythm. Heart sounds: Normal heart sounds. Pulmonary:      Effort: Pulmonary effort is normal. No respiratory distress. Breath sounds: Normal breath sounds. No rales.    Abdominal:      General: Bowel sounds are normal. There is no distension. Palpations: Abdomen is soft. There is no mass. Tenderness: There is no abdominal tenderness. There is no right CVA tenderness, left CVA tenderness, guarding or rebound. Hernia: No hernia is present. Comments: Diastasis of the rectus muscle   Musculoskeletal:         General: No tenderness or deformity. Normal range of motion. Cervical back: Normal range of motion and neck supple. Lymphadenopathy:      Cervical: No cervical adenopathy. Skin:     General: Skin is warm and dry. Findings: No erythema or rash. Neurological:      Mental Status: He is alert and oriented to person, place, and time. Cranial Nerves: No cranial nerve deficit. Coordination: Coordination normal.   Psychiatric:         Mood and Affect: Mood normal.         Thought Content: Thought content normal.         Judgment: Judgment normal.          Assessment and plan     Plan of care has been discussed with the patient, he agrees to the plan and verbalized understanding. All his questions were answered  More than 50% of the time spent in this visit was counseling the patient about  illness and treatment options         1. Uncontrolled type 2 diabetes mellitus with hyperglycemia (HCC)  Blood sugars well controlled now  Continue same medication follow-up in 3 months  - METABOLIC PANEL, COMPREHENSIVE; Future  - MICROALBUMIN, UR, RAND W/ MICROALB/CREAT RATIO; Future  - AMB POC HEMOGLOBIN A1C  - metFORMIN ER (GLUCOPHAGE XR) 750 mg tablet; TAKE 1 TABLET EVERY DAY  Dispense: 90 Tablet; Refill: 1    2. Mixed hyperlipidemia  He is adherent to statins    3. Post concussive syndrome  His headache has resolved    4. Chronic bilateral low back pain without sciatica  Headache has much improved and resolved on amitriptyline 25 mg at night    5. New daily persistent headache  Much improved almost resolved with amitriptyline    6. Prostate cancer screening    - PSA SCREENING (SCREENING); Future    7.  Diastasis recti  Patient advised to restrict carbohydrates exercise to strengthen abdominal muscles    Current Outpatient Medications   Medication Sig Dispense Refill    metFORMIN ER (GLUCOPHAGE XR) 750 mg tablet TAKE 1 TABLET EVERY DAY 90 Tablet 1    glimepiride (AMARYL) 2 mg tablet TAKE 1 TABLET EVERY MORNING 90 Tablet 0    lancets (Accu-Chek Fastclix Lancet Drum) misc Test BS daily    Dx code E11.65 100 Each 3    Lancing Device with Lancets (Accu-Chek Multiclix Lancet) kit E11.65 - BG testing daily and PRN 1 Kit 0    rosuvastatin (CRESTOR) 20 mg tablet TAKE 1 TABLET AT BEDTIME 90 Tablet 0    tiZANidine (ZANAFLEX) 4 mg tablet 1 tab every 12 hours as needed for muscle spasm 60 Tablet 1    hydroCHLOROthiazide (HYDRODIURIL) 12.5 mg tablet TAKE 1 TABLET EVERY DAY 90 Tablet 1    lisinopriL (PRINIVIL, ZESTRIL) 30 mg tablet Take 1 Tablet by mouth daily for 90 days. 90 Tablet 1    amitriptyline (ELAVIL) 25 mg tablet Take 1 Tablet by mouth nightly. 90 Tablet 3    oxyCODONE-acetaminophen (PERCOCET) 5-325 mg per tablet TAKE 1 TABLET BY MOUTH EVERY 6 HOURS AS NEEDED FOR PAIN      alcohol swabs (BD Single Use Swabs Regular) padm USE EVERY  Pad 3    lancets (Accu-Chek Softclix Lancets) misc Use to check  BS daily   Dx code E11.65 100 Each 1    Blood Glucose Control High&Low (ACCU-CHEK TRISTA CONTROL SOLN) soln Use as directed 1 Bottle 0    Blood-Glucose Meter (ACCU-CHEK TRISTA CONNECT METER) [facility] E11.65 - BG testing daily and PRN 1 Each 0    glucose blood VI test strips (ACCU-CHEK TRISTA PLUS TEST STRP) strip Test BS daily and PRN   Dx code E11.65 100 Strip 0    acetaminophen (TYLENOL) 325 mg tablet Take 650 mg by mouth every four (4) hours as needed for Pain.  clotrimazole-betamethasone (LOTRISONE) topical cream apply to affected area twice a day 30 g 1    Omeprazole delayed release (PRILOSEC D/R) 20 mg tablet Take 1 Tab by mouth two (2) times a day.  Indications: HELICOBACTER PYLORI GASTRITIS 28 Tab 0    ondansetron (ZOFRAN ODT) 4 mg disintegrating tablet Take 1 Tab by mouth every eight (8) hours as needed for Nausea. 40 Tab 1       Patient Active Problem List    Diagnosis Date Noted    Diastasis recti 12/21/2021    History of peptic ulcer 01/23/2020    Postconcussion syndrome 01/23/2020    Hypertensive left ventricular hypertrophy, without heart failure 01/23/2020    Type 2 diabetes mellitus without complication, without long-term current use of insulin ([facility] [address] 01/23/2020    Influenza vaccination declined by patient 12/06/2018    Headache disorder 12/25/2017    Mixed hyperlipidemia 06/08/2013    Essential hypertension     Chronic bilateral low back pain without sciatica      Results for orders placed or performed in visit on 12/21/21   AMB POC HEMOGLOBIN A1C   Result Value Ref Range    Hemoglobin A1c (POC) 7.6 %     Office Visit on 12/21/2021   Component Date Value Ref Range Status    Hemoglobin A1c (POC) 12/21/2021 7.6  % Final          Follow-up and Dispositions    · Return in about 3 months (around 3/21/2022) for for medicare wellness.

## 2021-12-22 DIAGNOSIS — E11.65 UNCONTROLLED TYPE 2 DIABETES MELLITUS WITH HYPERGLYCEMIA (HCC): Chronic | ICD-10-CM

## 2021-12-22 LAB
ALBUMIN SERPL-MCNC: 4.3 G/DL (ref 3.8–4.9)
ALBUMIN/GLOB SERPL: 1.7 {RATIO} (ref 1.2–2.2)
ALP SERPL-CCNC: 50 IU/L (ref 44–121)
ALT SERPL-CCNC: 12 IU/L (ref 0–44)
AST SERPL-CCNC: 14 IU/L (ref 0–40)
BILIRUB SERPL-MCNC: 0.3 MG/DL (ref 0–1.2)
BUN SERPL-MCNC: 10 MG/DL (ref 6–24)
BUN/CREAT SERPL: 8 (ref 9–20)
CALCIUM SERPL-MCNC: 9.1 MG/DL (ref 8.7–10.2)
CHLORIDE SERPL-SCNC: 101 MMOL/L (ref 96–106)
CO2 SERPL-SCNC: 26 MMOL/L (ref 20–29)
CREAT SERPL-MCNC: 1.18 MG/DL (ref 0.76–1.27)
GLOBULIN SER CALC-MCNC: 2.5 G/DL (ref 1.5–4.5)
GLUCOSE SERPL-MCNC: 134 MG/DL (ref 65–99)
POTASSIUM SERPL-SCNC: 3.9 MMOL/L (ref 3.5–5.2)
PROT SERPL-MCNC: 6.8 G/DL (ref 6–8.5)
PSA SERPL-MCNC: 2.3 NG/ML (ref 0–4)
SODIUM SERPL-SCNC: 138 MMOL/L (ref 134–144)
SPECIMEN STATUS REPORT, ROLRST: NORMAL

## 2021-12-22 RX ORDER — GLIMEPIRIDE 2 MG/1
2 TABLET ORAL DAILY
Qty: 90 TABLET | Refills: 0 | Status: SHIPPED | OUTPATIENT
Start: 2021-12-22 | End: 2022-01-03

## 2021-12-22 NOTE — TELEPHONE ENCOUNTER
Received call from pt/spouse requesting refill of medication be sent to local pharmacy; they have not received the refill from the mail order pharmacy.

## 2021-12-31 DIAGNOSIS — E78.2 MIXED HYPERLIPIDEMIA: Chronic | ICD-10-CM

## 2022-01-02 DIAGNOSIS — E11.65 UNCONTROLLED TYPE 2 DIABETES MELLITUS WITH HYPERGLYCEMIA (HCC): Chronic | ICD-10-CM

## 2022-01-03 DIAGNOSIS — M54.41 ACUTE BILATERAL LOW BACK PAIN WITH BILATERAL SCIATICA: ICD-10-CM

## 2022-01-03 DIAGNOSIS — R29.898 WEAKNESS OF BOTH LEGS: ICD-10-CM

## 2022-01-03 DIAGNOSIS — M54.42 ACUTE BILATERAL LOW BACK PAIN WITH BILATERAL SCIATICA: ICD-10-CM

## 2022-01-03 RX ORDER — TIZANIDINE 4 MG/1
TABLET ORAL
Qty: 60 TABLET | Refills: 1 | Status: SHIPPED | OUTPATIENT
Start: 2022-01-03 | End: 2022-03-20

## 2022-01-03 RX ORDER — ROSUVASTATIN CALCIUM 20 MG/1
TABLET, COATED ORAL
Qty: 90 TABLET | Refills: 3 | Status: SHIPPED | OUTPATIENT
Start: 2022-01-03

## 2022-01-03 RX ORDER — LANCING DEVICE/LANCETS
KIT MISCELLANEOUS
Qty: 1 KIT | Refills: 0 | Status: SHIPPED | OUTPATIENT
Start: 2022-01-03 | End: 2022-05-16

## 2022-01-03 RX ORDER — GLIMEPIRIDE 2 MG/1
TABLET ORAL
Qty: 90 TABLET | Refills: 0 | Status: SHIPPED | OUTPATIENT
Start: 2022-01-03 | End: 2022-04-07

## 2022-01-13 NOTE — TELEPHONE ENCOUNTER
Last Office visit:  12/21/2021    Last Filled: 1/29/2020; Qty 100 Strips w/ 0 refills     Follow up visit:    Future Appointments   Date Time Provider Rosalind Baca   3/8/2022 11:00 AM Rodrigo Lowery MD [facility]

## 2022-01-16 RX ORDER — BLOOD SUGAR DIAGNOSTIC
STRIP MISCELLANEOUS
Qty: 100 STRIP | Refills: 0 | Status: SHIPPED | OUTPATIENT
Start: 2022-01-16 | End: 2022-01-19 | Stop reason: SDUPTHER

## 2022-01-19 DIAGNOSIS — E11.65 UNCONTROLLED TYPE 2 DIABETES MELLITUS WITH HYPERGLYCEMIA (HCC): Primary | ICD-10-CM

## 2022-01-19 RX ORDER — ISOPROPYL ALCOHOL 70 ML/100ML
SWAB TOPICAL
Qty: 100 PAD | Refills: 11 | Status: SHIPPED | OUTPATIENT
Start: 2022-01-19

## 2022-01-19 RX ORDER — BLOOD SUGAR DIAGNOSTIC
STRIP MISCELLANEOUS
Qty: 100 STRIP | Refills: 11 | Status: SHIPPED | OUTPATIENT
Start: 2022-01-19

## 2022-01-19 NOTE — TELEPHONE ENCOUNTER
Judi Pan from the [place] [address] called in regards to get a refill for patient he stated that the patient needs Alcohol swabs as well as the Glucose blood VI test strips .  The patient wanted them to be sent via [facility]

## 2022-02-14 DIAGNOSIS — I10 ESSENTIAL HYPERTENSION: Chronic | ICD-10-CM

## 2022-02-14 RX ORDER — LISINOPRIL 30 MG/1
TABLET ORAL
Qty: 90 TABLET | Refills: 1 | Status: SHIPPED | OUTPATIENT
Start: 2022-02-14 | End: 2022-07-11

## 2022-02-22 ENCOUNTER — PATIENT OUTREACH (OUTPATIENT)
Dept: CASE MANAGEMENT | Age: 57
End: 2022-02-22

## 2022-02-22 NOTE — PROGRESS NOTES
Transitions of Care Call  Call within 2 business days of discharge: Yes     Patient: José Miguel Wright Patient : 1965 MRN: [id number]     Was this an external facility discharge? Yes, 22-22 Discharge Facility: [facility]: Syncope and collapse    Challenges to be reviewed by the provider   Additional needs identified to be addressed with provider no  none           Encounter was not routed to provider for escalation. Method of communication with provider: chart routing. Contacted patient for post hospital assessment. Patient voiced he was out eating and verbalized he will return call later. Verified patient has CTN contact info.     Constanza Navarro RN

## 2022-02-22 NOTE — PROGRESS NOTES
Returned call to patient. No answer. Left HIPPA compliant message and contact info. Will attempt to reach at a later time.

## 2022-02-23 ENCOUNTER — PATIENT OUTREACH (OUTPATIENT)
Dept: CASE MANAGEMENT | Age: 57
End: 2022-02-23

## 2022-02-23 NOTE — PROGRESS NOTES
Transitions of Care Call  Call within 2 business days of discharge: Yes     Patient: Saint Solan Patient : 1965 MRN: [id number]     Was this an external facility discharge? Yes, 22-22 [place] [facility]: Syncope and collapse     Challenges to be reviewed by the provider   Additional needs identified to be addressed with provider: yes   medications- Patient voiced he does like finger sticks. [facility] Please order if appropriate for patient. Encounter was not routed to provider for escalation. Method of communication with provider: chart routing. Discussed COVID-19 related testing which was not done at this time. Test results were not done. Patient informed of results, if available? N/A. Current Symptoms:no new symptoms and no worsening symptoms    Reviewed New or Changed Meds: yes; patient made aware to monitor BP and if SBP consistently > 135 to resume Lisinopril and HCTZ    Do you have what you need at home?  Durable Medical Equipment ordered at discharge: None   Home Health/Outpatient orders at discharge: none    Pulse oximeter? no     Educated patient on portion control, reading labels and the effect of exercise on BS. Suggested making small changes such as switching to reduced sugar orange juice, eating 1 pancake vs 3 pancakes, and measuring cereal based on label vs filling up the bowl. Patient voiced understanding. Patient education provided: Reviewed appropriate site of care based on symptoms and resources available to patient including: PCP, Specialist and CTN. Follow up appointment scheduled within 7 days of discharge: yes.  If no appointment scheduled, scheduling offered: no.  Future Appointments   Date Time Provider Rosalind Baca   2022  1:00 PM Helene Osgood, MD [facility]   3/8/2022 11:00 AM Helene Osgood, MD [facility]       Interventions: Scheduled appointment with Specialist-Advised to [facility] to scheduled follow up appt and Obtained and reviewed discharge summary and/or continuity of care documents  CTN reviewed discharge instructions, medical action plan and red flags with patient/wife who verbalized understanding. Provided contact information for future needs. Plan for follow-up call in 7-10 days based on severity of symptoms and risk factors.   Plan for next call: self management-incorporating 30 mins of exercise 3 times weekly, reducing carb and sugar intake and portion control , follow up appointment-follow up in scheduling of endocrinology appt with  [facility] and medication management-taking synthroid     Beto Rucker RN

## 2022-02-28 ENCOUNTER — PATIENT OUTREACH (OUTPATIENT)
Dept: CASE MANAGEMENT | Age: 57
End: 2022-02-28

## 2022-02-28 NOTE — PROGRESS NOTES
Contacted patient to follow up on missed appt today with PCP. Patient verbalized he forgot and is at the hospital with his wife who had surgery today.  Made patient aware he does have an appt scheduled for 3/8/22 at 11 AM.

## 2022-03-02 ENCOUNTER — PATIENT OUTREACH (OUTPATIENT)
Dept: CASE MANAGEMENT | Age: 57
End: 2022-03-02

## 2022-03-02 NOTE — PROGRESS NOTES
Follow Up Call    Challenges to be reviewed by the provider   Additional needs identified to be addressed with provider: no  none           Encounter was not routed to provider for escalation. Method of communication with provider: none. Contacted the patient by telephone to follow up after hospital visit. Patient voiced he has been doing well. Reported SBP has not been > 135 so he has not resume HCTZ or Lisinopril. Patient verbalized he is on his way to see wife who is recovering from surgery. Denied any needs/concerns/questions at this time. Reminded patient of PCP appt on 3/8. Status: improved  Interventions to address identified needs: none    [facility] follow up appointment(s):   Future Appointments   Date Time Provider Rosalind Baca   3/8/2022 11:00 AM Shakeel Avitia MD [facility]     [facility] follow up appointment(s): none   Follow up appointment completed? no.     Provided contact information for future needs. Plan for follow-up call in 7-10 days based on severity of symptoms and risk factors.   Plan for next call: symptom management-follow up on BP readings and diabetes management     Yunior Singh RN

## 2022-03-09 ENCOUNTER — PATIENT OUTREACH (OUTPATIENT)
Dept: CASE MANAGEMENT | Age: 57
End: 2022-03-09

## 2022-03-09 NOTE — PROGRESS NOTES
Follow Up Call    Challenges to be reviewed by the provider   Additional needs identified to be addressed with provider: no  none           Encounter was not routed to provider for escalation. Method of communication with provider: none. Contacted the patient by telephone to follow up after hospital visit. No answer. Left HIPPA compliant message. Name, role and contact information provided. Requested return call. Contacted Davon Mercer, wife. Wife voiced she is in the hospital. She verbalized she will have patient return call. Will attempt to contact at a later time.

## 2022-03-17 ENCOUNTER — TELEPHONE (OUTPATIENT)
Dept: FAMILY MEDICINE CLINIC | Age: 57
End: 2022-03-17

## 2022-03-17 DIAGNOSIS — E11.65 UNCONTROLLED TYPE 2 DIABETES MELLITUS WITH HYPERGLYCEMIA (HCC): Primary | ICD-10-CM

## 2022-03-17 NOTE — TELEPHONE ENCOUNTER
mor from [address] [place] [address] stated patient has upcoming appointment with them called requesting a referral be sent over. [id number]   office   Seeing Dr. Kady Vera  No future appointments.   Last seen 12/21/21

## 2022-03-18 PROBLEM — Z28.21 INFLUENZA VACCINATION DECLINED BY PATIENT: Status: ACTIVE | Noted: 2018-12-06

## 2022-03-19 PROBLEM — I11.9 HYPERTENSIVE LEFT VENTRICULAR HYPERTROPHY, WITHOUT HEART FAILURE: Status: ACTIVE | Noted: 2020-01-23

## 2022-03-19 PROBLEM — F07.81 POSTCONCUSSION SYNDROME: Status: ACTIVE | Noted: 2020-01-23

## 2022-03-19 PROBLEM — R51.9 HEADACHE DISORDER: Status: ACTIVE | Noted: 2017-12-25

## 2022-03-19 PROBLEM — M62.08 DIASTASIS RECTI: Status: ACTIVE | Noted: 2021-12-21

## 2022-03-19 PROBLEM — E11.9 TYPE 2 DIABETES MELLITUS WITHOUT COMPLICATION, WITHOUT LONG-TERM CURRENT USE OF INSULIN (HCC): Status: ACTIVE | Noted: 2020-01-23

## 2022-03-19 PROBLEM — Z87.11 HISTORY OF PEPTIC ULCER: Status: ACTIVE | Noted: 2020-01-23

## 2022-03-20 RX ORDER — TIZANIDINE 4 MG/1
TABLET ORAL
Qty: 60 TABLET | Refills: 1 | Status: SHIPPED | OUTPATIENT
Start: 2022-03-20 | End: 2022-07-23

## 2022-03-22 ENCOUNTER — PATIENT OUTREACH (OUTPATIENT)
Dept: CASE MANAGEMENT | Age: 57
End: 2022-03-22

## 2022-03-24 ENCOUNTER — PATIENT OUTREACH (OUTPATIENT)
Dept: CASE MANAGEMENT | Age: 57
End: 2022-03-24

## 2022-03-24 NOTE — PROGRESS NOTES
Patient has graduated from the Transitions of Care Coordination  program on 3/24/22. Patient/family has the ability to self-manage at this time Care management goals have been completed. Patient was not referred to the [facility] team for further management. Patient has Care Transition Nurse's contact information for any further questions, concerns, or needs.   Patients upcoming visits:    Future Appointments   Date Time Provider Rosalind Baca   4/28/2022  8:15 AM Clovis Simms MD [facility]
Statement Selected

## 2022-04-07 DIAGNOSIS — E11.65 UNCONTROLLED TYPE 2 DIABETES MELLITUS WITH HYPERGLYCEMIA (HCC): Chronic | ICD-10-CM

## 2022-04-07 RX ORDER — GLIMEPIRIDE 2 MG/1
TABLET ORAL
Qty: 90 TABLET | Refills: 0 | Status: SHIPPED | OUTPATIENT
Start: 2022-04-07 | End: 2022-05-16

## 2022-05-16 ENCOUNTER — TELEPHONE (OUTPATIENT)
Dept: FAMILY MEDICINE CLINIC | Age: 57
End: 2022-05-16

## 2022-05-16 ENCOUNTER — OFFICE VISIT (OUTPATIENT)
Dept: FAMILY MEDICINE CLINIC | Age: 57
End: 2022-05-16
Payer: MEDICARE

## 2022-05-16 VITALS
DIASTOLIC BLOOD PRESSURE: 82 MMHG | WEIGHT: 220.2 LBS | BODY MASS INDEX: 29.18 KG/M2 | HEART RATE: 77 BPM | SYSTOLIC BLOOD PRESSURE: 120 MMHG | HEIGHT: 73 IN | TEMPERATURE: 96.8 F | OXYGEN SATURATION: 99 %

## 2022-05-16 DIAGNOSIS — I10 ESSENTIAL HYPERTENSION: Primary | ICD-10-CM

## 2022-05-16 DIAGNOSIS — E87.6 HYPOKALEMIA: ICD-10-CM

## 2022-05-16 DIAGNOSIS — E11.65 UNCONTROLLED TYPE 2 DIABETES MELLITUS WITH HYPERGLYCEMIA (HCC): ICD-10-CM

## 2022-05-16 LAB
GLUCOSE POC: 164 MG/DL
HBA1C MFR BLD HPLC: 8 %

## 2022-05-16 PROCEDURE — G8752 SYS BP LESS 140: HCPCS | Performed by: LEGAL MEDICINE

## 2022-05-16 PROCEDURE — 3017F COLORECTAL CA SCREEN DOC REV: CPT | Performed by: LEGAL MEDICINE

## 2022-05-16 PROCEDURE — 83036 HEMOGLOBIN GLYCOSYLATED A1C: CPT | Performed by: LEGAL MEDICINE

## 2022-05-16 PROCEDURE — 99214 OFFICE O/P EST MOD 30 MIN: CPT | Performed by: LEGAL MEDICINE

## 2022-05-16 PROCEDURE — G8754 DIAS BP LESS 90: HCPCS | Performed by: LEGAL MEDICINE

## 2022-05-16 PROCEDURE — 82962 GLUCOSE BLOOD TEST: CPT | Performed by: LEGAL MEDICINE

## 2022-05-16 PROCEDURE — G8427 DOCREV CUR MEDS BY ELIG CLIN: HCPCS | Performed by: LEGAL MEDICINE

## 2022-05-16 PROCEDURE — G8510 SCR DEP NEG, NO PLAN REQD: HCPCS | Performed by: LEGAL MEDICINE

## 2022-05-16 PROCEDURE — 2022F DILAT RTA XM EVC RTNOPTHY: CPT | Performed by: LEGAL MEDICINE

## 2022-05-16 PROCEDURE — 3052F HG A1C>EQUAL 8.0%<EQUAL 9.0%: CPT | Performed by: LEGAL MEDICINE

## 2022-05-16 PROCEDURE — G8419 CALC BMI OUT NRM PARAM NOF/U: HCPCS | Performed by: LEGAL MEDICINE

## 2022-05-16 RX ORDER — METFORMIN HYDROCHLORIDE 1000 MG/1
1000 TABLET ORAL 2 TIMES DAILY WITH MEALS
Qty: 180 TABLET | Refills: 1 | Status: SHIPPED | OUTPATIENT
Start: 2022-05-16 | End: 2022-05-16 | Stop reason: DRUGHIGH

## 2022-05-16 NOTE — PROGRESS NOTES
Ashley Jimenez     Chief Complaint   Patient presents with    Diabetes     Vitals:    05/16/22 1138   BP: 120/82   Pulse: 77   Temp: 96.8 °F (36 °C)   SpO2: 99%   Weight: 220 lb 3.2 oz (99.9 kg)   Height: 6' 1\" (1.854 m)   PainSc:   0 - No pain         HPI:Nallely is here with his wife for follow-up due to episode of syncope, he was taken to the emergency room first episode was in February 2022, and he went again on April 1, 2022  Latest labs has been reviewed he did have a hypokalemia    Otherwise all his results are normal there was alcohol in his blood 0.08  He felt sweaty and weak and dizzy before passing out    No documented hypoglycemia  But he does take metformin and glyburide in the morning without eating !!       Blood sugar readings last 2 week fasting 145- 120           Past Medical History:   Diagnosis Date    Allergic rhinitis due to pollen    Vallie Roller Diverticulosis Jan 2016    Colonoscopy - Dr Pratik Hui hypertension     Helicobacter pylori gastritis 7/22/2015    Clarithromycin, Amoxil, and Omeprazole BID x 14 days prescribed    Hemorrhoid Jan 2016    Colonoscopy - Dr Rana Fothergill Herniated disc     per pt multiple levels    History of lumbar fusion 1/8/2016    Influenza vaccination declined by patient - 12/26/17 12/26/2017    Noncompliance with treatment plan     Pancreatitis     PUD (peptic ulcer disease) Jan 2016    No H Pylori on biopsy, multiple small ulcers on EGD (Dr. Dao [place])    Scleral icterus 7/9/2015     Past Surgical History:   Procedure Laterality Date    HX COLONOSCOPY N/A Jan 2016    Dr. Dao [place] - hemorrhoids and diverticulosis, 10 year interval    HX ENDOSCOPY N/A Jan 2016    Dr. Dao [place] - multiple small ulcers, neg H pylori    HX LUMBAR FUSION       Social History     Tobacco Use    Smoking status: Current Some Day Smoker     Types: Cigars    Smokeless tobacco: Never Used    Tobacco comment: Pt smokes Black & Milds sometimes   Substance Use Topics    Alcohol use: Not Currently     Alcohol/week: 2.5 standard drinks     Types: 3 Standard drinks or equivalent per week     Comment: 5/2019 stopped drinking a few months ago and lost 30#s       Family History   Problem Relation Age of Onset    Diabetes Mother     Hypertension Father     Stroke Sister     Diabetes Sister        Review of Systems   Constitutional: Negative for chills, fever, malaise/fatigue and weight loss. HENT: Negative for congestion, ear discharge, ear pain, hearing loss, nosebleeds, sinus pain and sore throat. Eyes: Negative for blurred vision, double vision and discharge. Respiratory: Negative for cough, hemoptysis, sputum production, shortness of breath and wheezing. Cardiovascular: Negative for chest pain, palpitations, claudication and leg swelling. Gastrointestinal: Negative for abdominal pain, blood in stool, constipation, diarrhea, melena, nausea and vomiting. Genitourinary: Negative for dysuria, flank pain, frequency, hematuria and urgency. Musculoskeletal: Negative for back pain, falls, joint pain, myalgias and neck pain. Skin: Negative for itching and rash. Neurological: Negative for dizziness, tingling, sensory change, speech change, focal weakness, weakness and headaches. Psychiatric/Behavioral: Negative for depression and suicidal ideas. Physical Exam  Vitals and nursing note reviewed. Constitutional:       General: He is not in acute distress. Appearance: He is well-developed. He is not diaphoretic. HENT:      Head: Normocephalic and atraumatic. Eyes:      General: No scleral icterus. Right eye: No discharge. Left eye: No discharge. Neck:      Thyroid: No thyromegaly. Cardiovascular:      Rate and Rhythm: Normal rate and regular rhythm. Heart sounds: Normal heart sounds. Pulmonary:      Effort: Pulmonary effort is normal. No respiratory distress. Breath sounds: Normal breath sounds. No rales.    Abdominal:      General: Bowel sounds are normal. There is no distension. Palpations: Abdomen is soft. There is no mass. Tenderness: There is no abdominal tenderness. There is no guarding or rebound. Musculoskeletal:         General: No tenderness or deformity. Normal range of motion. Cervical back: Normal range of motion and neck supple. Lymphadenopathy:      Cervical: No cervical adenopathy. Skin:     General: Skin is warm and dry. Findings: No erythema or rash. Neurological:      Mental Status: He is alert and oriented to person, place, and time. Cranial Nerves: No cranial nerve deficit. Motor: No weakness. Coordination: Coordination normal.      Gait: Gait normal.   Psychiatric:         Mood and Affect: Mood normal.         Thought Content: Thought content normal.         Judgment: Judgment normal.          Assessment and plan     Plan of care has been discussed with the patient, he agrees to the plan and verbalized understanding. All his questions were answered  More than 50% of the time spent in this visit was counseling the patient about  illness and treatment options         1. Uncontrolled type 2 diabetes mellitus with hyperglycemia ([facility] [address]  He has been adherent to medications blood glucose was 164  And hemoglobin A1c is 8.0    Patient is adherent to metformin and glipizide  Due to episodes of sweating and syncope will stop glipizide and replace it with Sitagliptin    - AMB POC HEMOGLOBIN A1C  - SITagliptin-metFORMIN (JANUMET) 50-1,000 mg per tablet; Take 1 Tablet by mouth two (2) times daily (with meals) for 90 days. Dispense: 180 Tablet; Refill: 0  - CBC W/O DIFF; Future  - AMB POC GLUCOSE BLOOD, BY GLUCOSE MONITORING DEVICE    2. Essential hypertension  Blood pressures well controlled on current medications  - METABOLIC PANEL, COMPREHENSIVE; Future  - CBC W/O DIFF; Future    3. Hypokalemia    - METABOLIC PANEL, COMPREHENSIVE;  Future    Current Outpatient Medications   Medication Sig Dispense Refill    SITagliptin-metFORMIN (JANUMET) 50-1,000 mg per tablet Take 1 Tablet by mouth two (2) times daily (with meals) for 90 days. 180 Tablet 0    tiZANidine (ZANAFLEX) 4 mg tablet take 1 tablet by mouth every 12 hours if needed for muscle spasm 60 Tablet 1    lisinopriL (PRINIVIL, ZESTRIL) 30 mg tablet TAKE 1 TABLET EVERY DAY 90 Tablet 1    alcohol swabs (BD Single Use Swabs Regular) padm USE EVERY  Pad 11    glucose blood VI test strips (Accu-Chek Colette Plus test strp) strip Test BS daily and PRN   Dx code E11.65 100 Strip 11    rosuvastatin (CRESTOR) 20 mg tablet TAKE 1 TABLET AT BEDTIME 90 Tablet 3    lancets (Accu-Chek Fastclix Lancet Drum) misc Test BS daily    Dx code E11.65 100 Each 3    hydroCHLOROthiazide (HYDRODIURIL) 12.5 mg tablet TAKE 1 TABLET EVERY DAY 90 Tablet 1    amitriptyline (ELAVIL) 25 mg tablet Take 1 Tablet by mouth nightly. 90 Tablet 3    lancets (Accu-Chek Softclix Lancets) misc Use to check  BS daily   Dx code E11.65 100 Each 1    Blood Glucose Control High&Low (ACCU-CHEK COLETTE CONTROL SOLN) soln Use as directed 1 Bottle 0    Blood-Glucose Meter (ACCU-CHEK COLETTE CONNECT METER) [facility] [place]11.65 - BG testing daily and PRN 1 Each 0    acetaminophen (TYLENOL) 325 mg tablet Take 650 mg by mouth every four (4) hours as needed for Pain.  clotrimazole-betamethasone (LOTRISONE) topical cream apply to affected area twice a day 30 g 1    ondansetron (ZOFRAN ODT) 4 mg disintegrating tablet Take 1 Tab by mouth every eight (8) hours as needed for Nausea.  36 Tab 1    Lancing Device with Lancets (Accu-Chek FastClix Lancing Dev) kit USE TO TEST BLOOD SUGAR EVERY DAY AND AS NEEDED 1 Kit 0       Patient Active Problem List    Diagnosis Date Noted    Diastasis recti 12/21/2021    History of peptic ulcer 01/23/2020    Postconcussion syndrome 01/23/2020    Hypertensive left ventricular hypertrophy, without heart failure 01/23/2020    Type 2 diabetes mellitus without complication, without long-term current use of insulin ([facility] [address] 01/23/2020    Influenza vaccination declined by patient 12/06/2018    Headache disorder 12/25/2017    Mixed hyperlipidemia 06/08/2013    Essential hypertension     Chronic bilateral low back pain without sciatica      Results for orders placed or performed in visit on 05/16/22   AMB POC HEMOGLOBIN A1C   Result Value Ref Range    Hemoglobin A1c (POC) 8.0 %   AMB POC GLUCOSE BLOOD, BY GLUCOSE MONITORING DEVICE   Result Value Ref Range    Glucose  MG/DL     Office Visit on 05/16/2022   Component Date Value Ref Range Status    Hemoglobin A1c (POC) 05/16/2022 8.0  % Final    Glucose POC 05/16/2022 164  MG/DL Final          Follow-up and Dispositions    · Return in about 3 months (around 8/16/2022).

## 2022-05-16 NOTE — TELEPHONE ENCOUNTER
----- Message from Harleen Jones sent at 5/16/2022 10:05 AM EDT -----  Subject: Message to Provider    QUESTIONS  Information for Provider? Patient will be there for his appt this morning   at 11:45 am.  ---------------------------------------------------------------------------  --------------  CALL BACK INFO  What is the best way for the office to contact you? OK to leave message on   voicemail  Preferred Call Back Phone Number?  [phone]  ---------------------------------------------------------------------------  --------------  SCRIPT ANSWERS  undefined

## 2022-06-23 ENCOUNTER — PATIENT MESSAGE (OUTPATIENT)
Dept: FAMILY MEDICINE CLINIC | Age: 57
End: 2022-06-23

## 2022-06-23 DIAGNOSIS — G44.52 NEW DAILY PERSISTENT HEADACHE: ICD-10-CM

## 2022-06-23 DIAGNOSIS — I10 ESSENTIAL HYPERTENSION: Chronic | ICD-10-CM

## 2022-06-23 RX ORDER — AMITRIPTYLINE HYDROCHLORIDE 25 MG/1
TABLET, FILM COATED ORAL
Qty: 90 TABLET | Refills: 3 | Status: SHIPPED | OUTPATIENT
Start: 2022-06-23

## 2022-06-24 NOTE — TELEPHONE ENCOUNTER
From: Nikky Richter  To: Maria G Bruce MD  Sent: 6/23/2022 10:33 PM EDT  Subject: Medication refill     Need refill for hydroCHLOROthiazide 12.5 mg Tabs. Im completely out. Thank you.

## 2022-06-24 NOTE — TELEPHONE ENCOUNTER
Last visit: 5/16/22  Next visit: No future appointments. Last filled: 9/2/21; hctz 12.5 mg tab; qty 90 w/1 refill    Patient is completely out of this medication.

## 2022-06-26 RX ORDER — HYDROCHLOROTHIAZIDE 12.5 MG/1
TABLET ORAL
Qty: 90 TABLET | Refills: 1 | Status: SHIPPED | OUTPATIENT
Start: 2022-06-26

## 2022-07-10 DIAGNOSIS — I10 ESSENTIAL HYPERTENSION: Chronic | ICD-10-CM

## 2022-07-11 RX ORDER — LISINOPRIL 30 MG/1
TABLET ORAL
Qty: 90 TABLET | Refills: 1 | Status: SHIPPED | OUTPATIENT
Start: 2022-07-11

## 2022-07-23 DIAGNOSIS — M54.50 CHRONIC BILATERAL LOW BACK PAIN WITHOUT SCIATICA: ICD-10-CM

## 2022-07-23 DIAGNOSIS — M54.2 CERVICAL PAIN: Primary | ICD-10-CM

## 2022-07-23 DIAGNOSIS — G89.29 CHRONIC BILATERAL LOW BACK PAIN WITHOUT SCIATICA: ICD-10-CM

## 2022-07-23 RX ORDER — TIZANIDINE 4 MG/1
TABLET ORAL
Qty: 60 TABLET | Refills: 0 | Status: SHIPPED | OUTPATIENT
Start: 2022-07-23 | End: 2022-09-12

## 2022-09-07 RX ORDER — LANCETS
EACH MISCELLANEOUS
Qty: 102 EACH | Refills: 5 | Status: SHIPPED | OUTPATIENT
Start: 2022-09-07

## 2022-09-12 DIAGNOSIS — M54.2 CERVICAL PAIN: ICD-10-CM

## 2022-09-12 DIAGNOSIS — M54.50 CHRONIC BILATERAL LOW BACK PAIN WITHOUT SCIATICA: ICD-10-CM

## 2022-09-12 DIAGNOSIS — G89.29 CHRONIC BILATERAL LOW BACK PAIN WITHOUT SCIATICA: ICD-10-CM

## 2022-09-12 RX ORDER — TIZANIDINE 4 MG/1
TABLET ORAL
Qty: 60 TABLET | Refills: 0 | Status: SHIPPED | OUTPATIENT
Start: 2022-09-12 | End: 2022-10-30

## 2022-09-19 ENCOUNTER — TELEPHONE (OUTPATIENT)
Dept: FAMILY MEDICINE CLINIC | Age: 57
End: 2022-09-19

## 2022-09-19 DIAGNOSIS — E11.9 TYPE 2 DIABETES MELLITUS WITHOUT COMPLICATION, WITHOUT LONG-TERM CURRENT USE OF INSULIN (HCC): ICD-10-CM

## 2022-09-19 DIAGNOSIS — I10 ESSENTIAL HYPERTENSION: Primary | ICD-10-CM

## 2022-09-19 DIAGNOSIS — E78.2 MIXED HYPERLIPIDEMIA: ICD-10-CM

## 2022-09-19 NOTE — TELEPHONE ENCOUNTER
----- Message from Kriss Song sent at 9/16/2022  2:42 PM EDT -----  Subject: Referral Request    Reason for referral request? Pt would like to have a referral sent to   [facility]   Provider patient wants to be referred to(if known):     Provider Phone Number(if known):[phone]    Additional Information for Provider?   ---------------------------------------------------------------------------  --------------  [id number]    [phone]; OK to leave message on voicemail  ---------------------------------------------------------------------------  --------------

## 2022-09-20 NOTE — TELEPHONE ENCOUNTER
Spoke w/pt's spouse and she wants to know are you aware that pt has been to ER twice and was advised to get a cardiology referral?

## 2022-09-20 NOTE — TELEPHONE ENCOUNTER
He is due for diabetes follow up , please schedule appointment will address his concern regarding cardiology referral at next visit

## 2022-10-13 ENCOUNTER — OFFICE VISIT (OUTPATIENT)
Dept: FAMILY MEDICINE CLINIC | Age: 57
End: 2022-10-13
Payer: MEDICARE

## 2022-10-13 ENCOUNTER — APPOINTMENT (OUTPATIENT)
Dept: FAMILY MEDICINE CLINIC | Age: 57
End: 2022-10-13

## 2022-10-13 VITALS
HEIGHT: 73 IN | SYSTOLIC BLOOD PRESSURE: 98 MMHG | WEIGHT: 217 LBS | DIASTOLIC BLOOD PRESSURE: 66 MMHG | HEART RATE: 66 BPM | RESPIRATION RATE: 16 BRPM | TEMPERATURE: 98.3 F | OXYGEN SATURATION: 98 % | BODY MASS INDEX: 28.76 KG/M2

## 2022-10-13 DIAGNOSIS — Z87.898 H/O SYNCOPE: ICD-10-CM

## 2022-10-13 DIAGNOSIS — G89.29 CHRONIC BILATERAL LOW BACK PAIN WITHOUT SCIATICA: ICD-10-CM

## 2022-10-13 DIAGNOSIS — E11.65 TYPE 2 DIABETES MELLITUS WITH HYPERGLYCEMIA, WITHOUT LONG-TERM CURRENT USE OF INSULIN (HCC): ICD-10-CM

## 2022-10-13 DIAGNOSIS — R56.9 SEIZURE-LIKE ACTIVITY (HCC): ICD-10-CM

## 2022-10-13 DIAGNOSIS — E11.9 TYPE 2 DIABETES MELLITUS WITHOUT COMPLICATION, WITHOUT LONG-TERM CURRENT USE OF INSULIN (HCC): ICD-10-CM

## 2022-10-13 DIAGNOSIS — Z09 HOSPITAL DISCHARGE FOLLOW-UP: Primary | ICD-10-CM

## 2022-10-13 DIAGNOSIS — E11.65 UNCONTROLLED TYPE 2 DIABETES MELLITUS WITH HYPERGLYCEMIA (HCC): ICD-10-CM

## 2022-10-13 DIAGNOSIS — I10 ESSENTIAL HYPERTENSION: ICD-10-CM

## 2022-10-13 DIAGNOSIS — E87.6 HYPOKALEMIA: ICD-10-CM

## 2022-10-13 DIAGNOSIS — M54.50 CHRONIC BILATERAL LOW BACK PAIN WITHOUT SCIATICA: ICD-10-CM

## 2022-10-13 DIAGNOSIS — I11.9 HYPERTENSIVE LEFT VENTRICULAR HYPERTROPHY, WITHOUT HEART FAILURE: ICD-10-CM

## 2022-10-13 PROCEDURE — G8510 SCR DEP NEG, NO PLAN REQD: HCPCS | Performed by: LEGAL MEDICINE

## 2022-10-13 PROCEDURE — 3052F HG A1C>EQUAL 8.0%<EQUAL 9.0%: CPT | Performed by: LEGAL MEDICINE

## 2022-10-13 PROCEDURE — 99214 OFFICE O/P EST MOD 30 MIN: CPT | Performed by: LEGAL MEDICINE

## 2022-10-13 PROCEDURE — G8427 DOCREV CUR MEDS BY ELIG CLIN: HCPCS | Performed by: LEGAL MEDICINE

## 2022-10-13 PROCEDURE — 3017F COLORECTAL CA SCREEN DOC REV: CPT | Performed by: LEGAL MEDICINE

## 2022-10-13 PROCEDURE — G8754 DIAS BP LESS 90: HCPCS | Performed by: LEGAL MEDICINE

## 2022-10-13 PROCEDURE — 2022F DILAT RTA XM EVC RTNOPTHY: CPT | Performed by: LEGAL MEDICINE

## 2022-10-13 PROCEDURE — G8752 SYS BP LESS 140: HCPCS | Performed by: LEGAL MEDICINE

## 2022-10-13 PROCEDURE — G8419 CALC BMI OUT NRM PARAM NOF/U: HCPCS | Performed by: LEGAL MEDICINE

## 2022-10-13 RX ORDER — LORATADINE 10 MG/1
10 TABLET ORAL DAILY
COMMUNITY
Start: 2022-06-09

## 2022-10-13 RX ORDER — FLUTICASONE PROPIONATE 50 MCG
1 SPRAY, SUSPENSION (ML) NASAL DAILY
COMMUNITY
Start: 2022-06-09

## 2022-10-13 RX ORDER — METFORMIN HYDROCHLORIDE 750 MG/1
750 TABLET, EXTENDED RELEASE ORAL 2 TIMES DAILY
COMMUNITY
Start: 2022-10-04

## 2022-10-13 RX ORDER — CHLORHEXIDINE GLUCONATE 1.2 MG/ML
RINSE ORAL
COMMUNITY
Start: 2022-09-19

## 2022-10-13 RX ORDER — PENICILLIN V POTASSIUM 500 MG/1
TABLET, FILM COATED ORAL
COMMUNITY
Start: 2022-09-19

## 2022-10-13 NOTE — PROGRESS NOTES
Иван Alfredo     Chief Complaint   Patient presents with    Hospital Follow Up     Vitals:    10/13/22 0936   BP: 98/66   Pulse: 66   Resp: 16   Temp: 98.3 °F (36.8 °C)   TempSrc: Temporal   SpO2: 98%   Weight: 217 lb (98.4 kg)   Height: 6' 1\" (1.854 m)   PainSc:   9   PainLoc: Back         HPI:Mr. Buddy Singh is here for hospital follow up  he ws seen inER on10/8 at [facility]   Admitted by his wife    ER notes and results has been reviewed   He was started on keppra 500 mg 2 times daily , he was referred to neurology from the ER  He may need another referral due to insurance  And this is a summary for the hospital stay    Patient had unrevealing telemetry monitoring. Echo showed some wall thickening, Grade 1 DD, EF 60%. This morning the patient feels well- asymptomatic. Has not had any further events. Further history- patient tells me that yesterday what happened is he was the passenger in a vehicle and the  noted he wasn't responding. States he was told he was staring off in space for 3-4 minutes, urinated on himself. He didn't fall or actually lose consciousness. He and his wife tell me he first had an episode like this in February which lasted longer and again he was incontinent. He's had some briefer episodes similarly where he stares. Never tonic-clonic but usually confused for a period after. They have noticed these episodes tend to happen when the patient hasn't eaten or slept well. Currently they have had CT head twice, MRI pituitary (adenoma? On CT), many EKGs, NST 9/2022 neg for ischemia, 2-D echo x 2 showing findings consistent with long standing hypertension. He is scheduled to see cardiology this week to set up a cardiac holter monitor. He is seeing his endocrinologist who thinks his fluctuating blood sugars could be causing this. I discussed with the patient and his wife that the episodes could be parital complex siezures.   They have not seen neurology or been referred. I discussed the episodes with Dr. Ivet Boyd on call for neurology and she agrees based on history this is possible. Recommended starting Keppra 500mg BID and having patient follow-up in the office for work-up and to see how he is responding. I discussed with patient and his wife. Rx written for 30 day supply further refills per neurology or PCP. I advised the patient he should not be driving. He is given a work note for yesterday --> 10th. Discussed with Dr. Constanza Metcalf who agreed discharge plan. He evaluated the patient prior to discharge.     Past Medical History:   Diagnosis Date    Allergic rhinitis due to pollen     Diverticulosis Jan 2016    Colonoscopy - Dr Lizeth Chen hypertension     Helicobacter pylori gastritis 7/22/2015    Clarithromycin, Amoxil, and Omeprazole BID x 14 days prescribed    Hemorrhoid Jan 2016    Colonoscopy - Dr Pelon Soni    Herniated disc     per pt multiple levels    History of lumbar fusion 1/8/2016    Influenza vaccination declined by patient - 12/26/17 12/26/2017    Noncompliance with treatment plan     Pancreatitis     PUD (peptic ulcer disease) Jan 2016    No H Pylori on biopsy, multiple small ulcers on EGD (Dr. Doyle Caputo)    Scleral icterus 7/9/2015     Past Surgical History:   Procedure Laterality Date    HX COLONOSCOPY N/A Jan 2016    Dr. Doyle Caputo - hemorrhoids and diverticulosis, 10 year interval    HX ENDOSCOPY N/A Jan 2016    Dr. Doyle Caputo - multiple small ulcers, neg H pylori    HX LUMBAR FUSION       Social History     Tobacco Use    Smoking status: Some Days     Types: Cigars    Smokeless tobacco: Never    Tobacco comments:     Pt smokes Black & Milds sometimes   Substance Use Topics    Alcohol use: Not Currently     Alcohol/week: 2.5 standard drinks     Types: 3 Standard drinks or equivalent per week     Comment: 5/2019 stopped drinking a few months ago and lost 30#s       Family History   Problem Relation Age of Onset    Diabetes Mother     Hypertension Father     Stroke Sister     Diabetes Sister        Review of Systems   Constitutional:  Negative for chills, fever, malaise/fatigue and weight loss. HENT:  Negative for congestion, ear discharge, ear pain, hearing loss, nosebleeds, sinus pain and sore throat. Eyes:  Negative for blurred vision, double vision and discharge. Respiratory:  Negative for cough, hemoptysis, sputum production, shortness of breath, wheezing and stridor. Cardiovascular:  Negative for chest pain, palpitations, claudication, leg swelling and PND. Gastrointestinal:  Negative for abdominal pain, blood in stool, constipation, diarrhea, melena, nausea and vomiting. Genitourinary:  Negative for dysuria, flank pain, frequency, hematuria and urgency. Musculoskeletal:  Positive for back pain and myalgias. Negative for falls, joint pain and neck pain. Skin:  Negative for itching and rash. Neurological:  Negative for dizziness, tingling, sensory change, speech change, focal weakness, weakness and headaches. Psychiatric/Behavioral:  Negative for depression, hallucinations, memory loss, substance abuse and suicidal ideas. The patient is not nervous/anxious and does not have insomnia. Physical Exam  Vitals and nursing note reviewed. Constitutional:       General: He is not in acute distress. Appearance: Normal appearance. He is well-developed. He is not ill-appearing, toxic-appearing or diaphoretic. HENT:      Head: Normocephalic and atraumatic. Eyes:      General: No scleral icterus. Right eye: No discharge. Left eye: No discharge. Conjunctiva/sclera: Conjunctivae normal.      Pupils: Pupils are equal, round, and reactive to light. Neck:      Thyroid: No thyromegaly. Cardiovascular:      Rate and Rhythm: Normal rate and regular rhythm. Heart sounds: Normal heart sounds. Pulmonary:      Effort: Pulmonary effort is normal. No respiratory distress.       Breath sounds: Normal breath sounds. No rales. Abdominal:      General: Bowel sounds are normal. There is no distension. Palpations: Abdomen is soft. There is no mass. Tenderness: There is no abdominal tenderness. There is no right CVA tenderness, guarding or rebound. Musculoskeletal:         General: No tenderness or deformity. Normal range of motion. Cervical back: Normal range of motion. No rigidity or tenderness. Right lower leg: No edema. Left lower leg: No edema. Lymphadenopathy:      Cervical: No cervical adenopathy. Skin:     General: Skin is warm and dry. Findings: No erythema or rash. Neurological:      Mental Status: He is alert and oriented to person, place, and time. Cranial Nerves: No cranial nerve deficit. Sensory: No sensory deficit. Motor: No weakness. Coordination: Coordination normal.      Gait: Gait normal.      Deep Tendon Reflexes: Reflexes normal.   Psychiatric:         Mood and Affect: Mood normal.         Behavior: Behavior normal.         Thought Content: Thought content normal.         Judgment: Judgment normal.        Assessment and plan     Plan of care has been discussed with the patient, he agrees to the plan and verbalized understanding. All his questions were answered  More than 50% of the time spent in this visit was counseling the patient about  illness and treatment options         1. H/O syncope  Patient is awaiting to see cardiologist for Holter monitor, as well as to see neurologist for possible EEG  - REFERRAL TO NEUROLOGY    2. Seizure-like activity ([address]  Patient has episode of recurrent seizures currently started on Keppra 500 mg twice a day  - REFERRAL TO NEUROLOGY    3. Essential hypertension  Blood pressures well controlled on current medications  - LIPID PANEL; Future    4.  Type 2 diabetes mellitus without complication, without long-term current use of insulin ([address]  86 is seeing endocrinologist 6 on 6 continue on fasting today take take some she is increasing symptoms actually better for him that he will TBP  Taking 1  You have exciting weekly like 22 kicks in the day when taking histopathology of the chickens which is leftover cake and the mom is still nice to  5. Hypertensive left ventricular hypertrophy, without heart failure      6. Chronic bilateral low back pain without sciatica  Patient is on disability for chronic back pain he has been taking Percocet that was given in the emergency room  I recommend that he seek pain management or he can be started on gabapentin, but in the past he only used to take Percocet does help him with his pain  7. Type 2 diabetes mellitus with hyperglycemia, without long-term current use of insulin ([facility])    - LIPID PANEL; Future  - HEMOGLOBIN A1C W/O EAG; Future  Current Outpatient Medications   Medication Sig Dispense Refill    chlorhexidine (PERIDEX) 0.12 % solution RINSE MOUTH WITH 15 MLS (1 CAPFUL) twice a day for 5 days      fluticasone propionate (FLONASE) 50 mcg/actuation nasal spray 1 Spray daily. loratadine (CLARITIN) 10 mg tablet Take 10 mg by mouth daily. penicillin v potassium (VEETID) 500 mg tablet take 1 tablet by mouth four times a day for 7 days      oxyCODONE-acetaminophen (PERCOCET 10)  mg per tablet Take  by mouth every six (6) hours as needed for Pain.      levETIRAcetam (Keppra) 500 mg tablet Take 1 Tablet by mouth two (2) times a day.  60 Tablet 0    tiZANidine (ZANAFLEX) 4 mg tablet take 1 tablet by mouth every 12 hours if needed muscle spasm 60 Tablet 0    Accu-Chek Fastclix Lancet Drum misc TEST BLOOD SUGAR EVERY  Each 5    lisinopriL (PRINIVIL, ZESTRIL) 30 mg tablet TAKE 1 TABLET EVERY DAY 90 Tablet 1    hydroCHLOROthiazide (HYDRODIURIL) 12.5 mg tablet TAKE 1 TABLET EVERY DAY 90 Tablet 1    amitriptyline (ELAVIL) 25 mg tablet TAKE 1 TABLET EVERY NIGHT 90 Tablet 3    alcohol swabs (BD Single Use Swabs Regular) padm USE EVERY  Pad 11 glucose blood VI test strips (Accu-Chek Colette Plus test strp) strip Test BS daily and PRN   Dx code E11.65 100 Strip 11    rosuvastatin (CRESTOR) 20 mg tablet TAKE 1 TABLET AT BEDTIME 90 Tablet 3    Blood Glucose Control High&Low (ACCU-CHEK COLETTE CONTROL SOLN) soln Use as directed 1 Bottle 0    Blood-Glucose Meter (ACCU-CHEK COLETTE CONNECT METER) misc E11.65 - BG testing daily and PRN 1 Each 0    acetaminophen (TYLENOL) 325 mg tablet Take 650 mg by mouth every four (4) hours as needed for Pain. clotrimazole-betamethasone (LOTRISONE) topical cream apply to affected area twice a day 30 g 1    ondansetron (ZOFRAN ODT) 4 mg disintegrating tablet Take 1 Tab by mouth every eight (8) hours as needed for Nausea. 40 Tab 1    metFORMIN ER (GLUCOPHAGE XR) 750 mg tablet Take 750 mg by mouth two (2) times a day.          Patient Active Problem List    Diagnosis Date Noted    Diastasis recti 12/21/2021    History of peptic ulcer 01/23/2020    Postconcussion syndrome 01/23/2020    Hypertensive left ventricular hypertrophy, without heart failure 01/23/2020    Type 2 diabetes mellitus without complication, without long-term current use of insulin ([facility] 01/23/2020    Influenza vaccination declined by patient 12/06/2018    Headache disorder 12/25/2017    Mixed hyperlipidemia 06/08/2013    Essential hypertension     Chronic bilateral low back pain without sciatica      Results for orders placed or performed during the hospital encounter of 10/08/22   CBC WITH AUTOMATED DIFF   Result Value Ref Range    WBC 11.9 (H) 4.0 - 11.0 1000/mm3    RBC 4.66 3.80 - 5.70 M/uL    HGB 13.4 12.4 - 17.2 gm/dl    HCT 41.2 37.0 - 50.0 %    MCV 88.4 80.0 - 98.0 fL    MCH 28.8 23.0 - 34.6 pg    MCHC 32.5 30.0 - 36.0 gm/dl    PLATELET 566 361 - 071 1000/mm3    MPV 9.8 6.0 - 10.0 fL    RDW-SD 46.8 (H) 35.1 - 43.9      NRBC 0 0 - 0      IMMATURE GRANULOCYTES 0.3 0.0 - 3.0 %    NEUTROPHILS 75.1 (H) 34 - 64 %    LYMPHOCYTES 14.3 (L) 28 - 48 %    MONOCYTES 9.1 1 - 13 %    EOSINOPHILS 0.9 0 - 5 %    BASOPHILS 0.3 0 - 3 %   METABOLIC PANEL, BASIC   Result Value Ref Range    Potassium 3.6 3.5 - 5.1 mEq/L    Chloride 101 98 - 107 mEq/L    Sodium 135 (L) 136 - 145 mEq/L    CO2 26 20 - 31 mEq/L    Glucose 269 (H) 74 - 106 mg/dl    BUN 7 (L) 9 - 23 mg/dl    Creatinine 1.19 0.70 - 1.30 mg/dl    GFR est AA >60      GFR est non-AA >60      Calcium 9.3 8.7 - 10.4 mg/dl    Anion gap 9 5 - 15 mmol/L   TROPONIN-HIGH SENSITIVITY   Result Value Ref Range    Troponin-High Sensitivity 43 0 - 53 ng/L   TROPONIN-HIGH SENSITIVITY   Result Value Ref Range    Troponin-High Sensitivity 41 0 - 53 ng/L   GLUCOSE, POC   Result Value Ref Range    Glucose (POC) 197 (H) 65 - 105 mg/dL   GLUCOSE, POC   Result Value Ref Range    Glucose (POC) 159 (H) 65 - 105 mg/dL   GLUCOSE, POC   Result Value Ref Range    Glucose (POC) 225 (H) 65 - 105 mg/dL   GLUCOSE, POC   Result Value Ref Range    Glucose (POC) 149 (H) 65 - 105 mg/dL   EKG, 12 LEAD, INITIAL   Result Value Ref Range    Ventricular Rate 81 BPM    Atrial Rate 81 BPM    P-R Interval 178 ms    QRS Duration 82 ms    Q-T Interval 416 ms    QTC Calculation (Bezet) 483 ms    Calculated P Axis 52 degrees    Calculated R Axis 28 degrees    Calculated T Axis 79 degrees    Diagnosis       Normal sinus rhythm  Minimal voltage criteria for LVH, may be normal variant ( Sokolow-Benjamin )  Nonspecific ST and T wave abnormality  Prolonged QT  Abnormal ECG  No previous ECGs available  Confirmed by Essence Arciniega M.D., Chyna Rowan ([id number]) on 10/9/2022 9:57:52 AM       Admission on 10/08/2022, Discharged on 10/09/2022   Component Date Value Ref Range Status    Ventricular Rate 10/08/2022 81  BPM Final    Atrial Rate 10/08/2022 81  BPM Final    P-R Interval 10/08/2022 178  ms Final    QRS Duration 10/08/2022 82  ms Final    Q-T Interval 10/08/2022 416  ms Final    QTC Calculation (Bezet) 10/08/2022 483  ms Final    Calculated P Axis 10/08/2022 52  degrees Final    Calculated R Axis 10/08/2022 28  degrees Final    Calculated T Axis 10/08/2022 79  degrees Final    Diagnosis 10/08/2022    Final                    Value:Normal sinus rhythm  Minimal voltage criteria for LVH, may be normal variant ( Sokolow-Benjamin )  Nonspecific ST and T wave abnormality  Prolonged QT  Abnormal ECG  No previous ECGs available  Confirmed by Clarissa Sherwood M.D., Adele Huitron (52) on 10/9/2022 9:57:52 AM      WBC 10/08/2022 11.9 (A)  4.0 - 11.0 1000/mm3 Final    RBC 10/08/2022 4.66  3.80 - 5.70 M/uL Final    HGB 10/08/2022 13.4  12.4 - 17.2 gm/dl Final    HCT 10/08/2022 41.2  37.0 - 50.0 % Final    MCV 10/08/2022 88.4  80.0 - 98.0 fL Final    MCH 10/08/2022 28.8  23.0 - 34.6 pg Final    MCHC 10/08/2022 32.5  30.0 - 36.0 gm/dl Final    PLATELET 62/71/6886 016  140 - 450 1000/mm3 Final    MPV 10/08/2022 9.8  6.0 - 10.0 fL Final    RDW-SD 10/08/2022 46.8 (A)  35.1 - 43.9   Final    NRBC 10/08/2022 0  0 - 0   Final    IMMATURE GRANULOCYTES 10/08/2022 0.3  0.0 - 3.0 % Final    Comment: IG - Immature granulocytes (promyelocytes + myelocytes + metamyelocytes), their presence  indicates a left shift. An IG > 3% may predict positive blood cultures with 98% specificity.  (P<0.04) and 92% Positive Predictive Value (Robert-Priyanka)1. Increased immature granulocytes  assist with the detection of infection and/or inflammation and may be present at an early stage  and are more sensitive and specific than band counts.         NEUTROPHILS 10/08/2022 75.1 (A)  34 - 64 % Final    LYMPHOCYTES 10/08/2022 14.3 (A)  28 - 48 % Final    MONOCYTES 10/08/2022 9.1  1 - 13 % Final    EOSINOPHILS 10/08/2022 0.9  0 - 5 % Final    BASOPHILS 10/08/2022 0.3  0 - 3 % Final    Potassium 10/08/2022 3.6  3.5 - 5.1 mEq/L Final    Chloride 10/08/2022 101  98 - 107 mEq/L Final    Sodium 10/08/2022 135 (A)  136 - 145 mEq/L Final    CO2 10/08/2022 26  20 - 31 mEq/L Final    Glucose 10/08/2022 269 (A)  74 - 106 mg/dl Final    BUN 10/08/2022 7 (A)  9 - 23 mg/dl Final    Creatinine 10/08/2022 1.19  0.70 - 1.30 mg/dl Final    GFR est AA 10/08/2022 >60    Final    Comment: THE [facility] STATES THAT THE MDRD STUDY EQUATION SHOULD ONLY BE USED ON  INDIVIDUALS 18 OR OLDER. THE REPORT ALSO NOTES THAT THE MDRD STUDY EQUATION HAS NOT BEEN  VALIDATED FOR USE WITH THE ELDERLY (OVER 79YEARS OF AGE), PREGNANT WOMEN, PATIENTS WITH SERIOUS  COMORBID CONDITIONS, OR PERSONS WITH EXTREMES OF BODY SIZE, MUSCLE MASS, OR NUTRITIONAL STATUS. APPLICATION OF THE EQUATION TO THESE PATIENT GROUPS MAY LEAD TO ERRORS IN GFR ESTIMATION. GFR  ESTIMATING EQUATIONS HAVE POORER AGREEMENT WITH MEASURED GFR FOR ILL HOSPITALIZED PATIENTS AND  FOR PEOPLE WITH NEAR NORMAL KIDNEY FUNCTION THAN FOR SUBJECTS IN THE MDRD STUDY. VALIDATION  STUDIES ARE IN PROGRESS TO EVALUATE THE MDRD STUDY EQUATION FOR ADDITIONAL ETHNIC GROUPS, THE  ELDERLY, VARIOUS DISEASE CONDITIONS, AND PEOPLE WITH NORMAL KIDNEY FUNCTION. GFRA----REFERS TO   GFRO---REFERS TO OTHER RACES    REFERENCES AVAILABLE UPON REQUEST. GFR est non-AA 10/08/2022 >60    Final    Calcium 10/08/2022 9.3  8.7 - 10.4 mg/dl Final    Anion gap 10/08/2022 9  5 - 15 mmol/L Final    Glucose (POC) 10/08/2022 197 (A)  65 - 105 mg/dL Final    Comment: Notified Nurse  : [id number]      Troponin-High Sensitivity 10/08/2022 43  0 - 53 ng/L Final    Comment: Up to 50% of normal patients may have low levels of detectable troponin (within reference range)  circulating in the blood. Mild elevations in troponin that are above the reference range may  present with other cardiac and non-cardiac disease states. Two or three serial tests are  recommended to evaluate changes in circulating troponin over time. Note: The reference range is based on the 99th percentile of combined male and female  population.       Troponin-High Sensitivity 10/08/2022 41  0 - 53 ng/L Final    Comment: Up to 50% of normal patients may have low levels of detectable troponin (within reference range)  circulating in the blood. Mild elevations in troponin that are above the reference range may  present with other cardiac and non-cardiac disease states. Two or three serial tests are  recommended to evaluate changes in circulating troponin over time. Note: The reference range is based on the 99th percentile of combined male and female  population. Glucose (POC) 10/08/2022 159 (A)  65 - 105 mg/dL Final    Comment: Notified Nurse  : [id number]      Glucose (POC) 10/09/2022 225 (A)  65 - 105 mg/dL Final    Comment: Notified Nurse  Notified MD  : [id number]      Glucose (POC) 10/09/2022 149 (A)  65 - 105 mg/dL Final    Comment: Notified Nurse  : [id number]            Follow-up and Dispositions    Return in about 3 months (around 1/13/2023).

## 2022-10-13 NOTE — PROGRESS NOTES
Anh Mensah is a 59 y.o. male (: 1965) presenting to address:    Chief Complaint   Patient presents with    Hospital Follow Up       Vitals:    10/13/22 0936   BP: 98/66   Pulse: 66   Resp: 16   Temp: 98.3 °F (36.8 °C)   TempSrc: Temporal   SpO2: 98%   Weight: 217 lb (98.4 kg)   Height: 6' 1\" (1.854 m)   PainSc:   9   PainLoc: Back       Hearing/Vision:   No results found. Learning Assessment:     Learning Assessment 2015   PRIMARY LEARNER Patient   HIGHEST LEVEL OF EDUCATION - PRIMARY LEARNER  GRADUATED HIGH SCHOOL OR GED   BARRIERS PRIMARY LEARNER NONE   CO-LEARNER CAREGIVER No   PRIMARY LANGUAGE ENGLISH   LEARNER PREFERENCE PRIMARY OTHER (COMMENT)   ANSWERED BY patient   RELATIONSHIP SELF     Depression Screening:     3 most recent PHQ Screens 10/13/2022   Little interest or pleasure in doing things Not at all   Feeling down, depressed, irritable, or hopeless Not at all   Total Score PHQ 2 0     Fall Risk Assessment:     Fall Risk Assessment, last 12 mths 2021   Able to walk? Yes   Fall in past 12 months? 0   Do you feel unsteady? 0   Are you worried about falling 0   Is TUG test greater than 12 seconds? -   Is the gait abnormal? -   Number of falls in past 12 months -   Fall with injury? -     Abuse Screening:     Abuse Screening Questionnaire 2021   Do you ever feel afraid of your partner? N   Are you in a relationship with someone who physically or mentally threatens you? N   Is it safe for you to go home?  Y     ADL Assessment:     ADL Assessment 2016   Feeding yourself No Help Needed   Getting from bed to chair No Help Needed   Getting dressed Help Needed   Bathing or showering No Help Needed   Walk across the room (includes cane/walker) No Help Needed   Using the telphone No Help Needed   Taking your medications No Help Needed   Preparing meals No Help Needed   Managing money (expenses/bills) No Help Needed   Moderately strenuous housework (laundry) Help Needed   Shopping for personal items (toiletries/medicines) Help Needed   Shopping for groceries Help Needed   Driving Help Needed   Climbing a flight of stairs No Help Needed   Getting to places beyond walking distances No Help Needed        Coordination of Care Questionaire:   1. \"Have you been to the ER, urgent care clinic since your last visit? Hospitalized since your last visit? \"  [address] for syncope episode    2. \"Have you seen or consulted any other health care providers outside of the [address] [place] [address] [place] since your last visit? \"  Endocrine follow up from hospital      3. For patients aged 39-70: Has the patient had a colonoscopy / FIT/ Cologuard? Yes - no Care Gap present    If the patient is female:    4. For patients aged 41-77: Has the patient had a mammogram within the past 2 years? NA - based on age or sex  See top three    5. For patients aged 21-65: Has the patient had a pap smear? NA - based on age or sex    Advanced Directive:   1. Do you have an Advanced Directive? YES    2. Would you like information on Advanced Directives?  NO

## 2022-10-14 LAB
ALBUMIN SERPL-MCNC: 4.6 G/DL (ref 3.8–4.9)
ALBUMIN/GLOB SERPL: 2.2 {RATIO} (ref 1.2–2.2)
ALP SERPL-CCNC: 69 IU/L (ref 44–121)
ALT SERPL-CCNC: 16 IU/L (ref 0–44)
AST SERPL-CCNC: 14 IU/L (ref 0–40)
BILIRUB SERPL-MCNC: 0.4 MG/DL (ref 0–1.2)
BUN SERPL-MCNC: 10 MG/DL (ref 6–24)
BUN/CREAT SERPL: 10 (ref 9–20)
CALCIUM SERPL-MCNC: 9.2 MG/DL (ref 8.7–10.2)
CHLORIDE SERPL-SCNC: 102 MMOL/L (ref 96–106)
CHOLEST SERPL-MCNC: 84 MG/DL (ref 100–199)
CO2 SERPL-SCNC: 21 MMOL/L (ref 20–29)
CREAT SERPL-MCNC: 1.04 MG/DL (ref 0.76–1.27)
EGFR: 83 ML/MIN/1.73
ERYTHROCYTE [DISTWIDTH] IN BLOOD BY AUTOMATED COUNT: 13.2 % (ref 11.6–15.4)
GLOBULIN SER CALC-MCNC: 2.1 G/DL (ref 1.5–4.5)
GLUCOSE SERPL-MCNC: 182 MG/DL (ref 70–99)
HBA1C MFR BLD: 8.4 % (ref 4.8–5.6)
HCT VFR BLD AUTO: 41.7 % (ref 37.5–51)
HDLC SERPL-MCNC: 31 MG/DL
HGB BLD-MCNC: 13.5 G/DL (ref 13–17.7)
IMP & REVIEW OF LAB RESULTS: NORMAL
LDLC SERPL CALC-MCNC: 33 MG/DL (ref 0–99)
MCH RBC QN AUTO: 28.6 PG (ref 26.6–33)
MCHC RBC AUTO-ENTMCNC: 32.4 G/DL (ref 31.5–35.7)
MCV RBC AUTO: 88 FL (ref 79–97)
PLATELET # BLD AUTO: 283 X10E3/UL (ref 150–450)
POTASSIUM SERPL-SCNC: 4.1 MMOL/L (ref 3.5–5.2)
PROT SERPL-MCNC: 6.7 G/DL (ref 6–8.5)
RBC # BLD AUTO: 4.72 X10E6/UL (ref 4.14–5.8)
SODIUM SERPL-SCNC: 139 MMOL/L (ref 134–144)
TRIGL SERPL-MCNC: 103 MG/DL (ref 0–149)
VLDLC SERPL CALC-MCNC: 20 MG/DL (ref 5–40)
WBC # BLD AUTO: 7.1 X10E3/UL (ref 3.4–10.8)

## 2022-10-15 NOTE — PROGRESS NOTES
HB a1c is 8.4  Normal cholesterol total and LDL   Low HDL good cholesterol that can increase with consuming healthy fast and exercise  Otherwise normal results     Please send results to endo on file

## 2022-10-28 DIAGNOSIS — M54.2 CERVICAL PAIN: ICD-10-CM

## 2022-10-28 DIAGNOSIS — G89.29 CHRONIC BILATERAL LOW BACK PAIN WITHOUT SCIATICA: ICD-10-CM

## 2022-10-28 DIAGNOSIS — M54.50 CHRONIC BILATERAL LOW BACK PAIN WITHOUT SCIATICA: ICD-10-CM

## 2022-10-30 RX ORDER — TIZANIDINE 4 MG/1
TABLET ORAL
Qty: 60 TABLET | Refills: 0 | Status: SHIPPED | OUTPATIENT
Start: 2022-10-30 | End: 2022-12-01 | Stop reason: SDUPTHER

## 2022-11-17 DIAGNOSIS — E78.2 MIXED HYPERLIPIDEMIA: Chronic | ICD-10-CM

## 2022-11-17 DIAGNOSIS — I10 ESSENTIAL HYPERTENSION: Chronic | ICD-10-CM

## 2022-11-17 RX ORDER — ROSUVASTATIN CALCIUM 20 MG/1
TABLET, COATED ORAL
Qty: 90 TABLET | Refills: 3 | Status: SHIPPED | OUTPATIENT
Start: 2022-11-17

## 2022-11-17 RX ORDER — HYDROCHLOROTHIAZIDE 12.5 MG/1
TABLET ORAL
Qty: 90 TABLET | Refills: 1 | Status: SHIPPED | OUTPATIENT
Start: 2022-11-17

## 2022-11-29 ENCOUNTER — PATIENT OUTREACH (OUTPATIENT)
Dept: CASE MANAGEMENT | Age: 57
End: 2022-11-29

## 2022-11-29 RX ORDER — METHYLPREDNISOLONE 4 MG/1
1 TABLET ORAL AS DIRECTED
COMMUNITY
Start: 2022-11-24

## 2022-11-29 RX ORDER — LIDOCAINE 50 MG/G
1 PATCH TOPICAL EVERY 12 HOURS
COMMUNITY
Start: 2022-11-24

## 2022-11-29 NOTE — PROGRESS NOTES
.Complex Case Management      Date/Time:  2022 2:14 PM    Method of communication with patient:phone    [facility] ([facility]) contacted the  patient and wife Davon Mercer ,who is on [address] ( )  by telephone to perform Ambulatory Care Coordination. Verified name and  (PHI) with family as identifiers. Provided introduction to self, and explanation of the Ambulatory Care Manager's role. Reviewed most recent clinic visit w/ family who verbalized understanding. Family given an opportunity to ask questions. Top Challenges reviewed with the Provider   Patient seen in [facility] ED x 2 for low back pains with Sciatica  &   Patient was given Medrol pack but declined to use it but ACM suggested that he use it to get some relief . Side effect will elevate his blood sugar, since he is DM2. Wife will [facility] with [facility] to see if she can still pick it up. Wife looks at patients Medication profile on line and do do not see the order for the the Lidoderm 5 % patches 30 ct. Asked [facility] to ask Dr Damian Meneses to order  Patient needs refills for Tizanidine 4 mg tabs as they never received the one from 10/30/22 that was ordered by Dr Damian Meneses. Please send to the [facility] aide pharmacy  Patient also needs refill for Levetiracetam 500 mg twice a day. Please send to the [facility] pharmacy  Patient forgot to make a 3 month f/u appointment. Do he needs to make it earlier since being in the ED x 2? If so can Tana Juarez call him. The family agrees to contact the PCP office or the [facility] for questions related to their healthcare. Provided contact information for future reference.     Disease Specific:   N/A    Home Health Active: No    DME Active: No    Barriers to care? lack of knowledge about disease Patient do not want to take Steroids    Advance Care Planning:   Does patient have an Advance Directive:  not on file; education provided     Medication(s):   Medication reconciliation was performed with  wife and patient  , who verbalizes understanding of administration of home medications. There were no barriers to obtaining medications identified at this time. Referral to Pharm D needed: no     Current Outpatient Medications   Medication Sig    lidocaine (LIDODERM) 5 % 1 Patch by TransDERmal route every twelve (12) hours. methylPREDNISolone (MEDROL DOSEPACK) 4 mg tablet Take 1 Tablet by mouth as directed. hydroCHLOROthiazide (HYDRODIURIL) 12.5 mg tablet TAKE 1 TABLET EVERY DAY    rosuvastatin (CRESTOR) 20 mg tablet TAKE 1 TABLET AT BEDTIME    metFORMIN ER (GLUCOPHAGE XR) 750 mg tablet Take 750 mg by mouth two (2) times a day. levETIRAcetam (Keppra) 500 mg tablet Take 1 Tablet by mouth two (2) times a day. Accu-Chek Fastclix Lancet Drum misc TEST BLOOD SUGAR EVERY DAY    lisinopriL (PRINIVIL, ZESTRIL) 30 mg tablet TAKE 1 TABLET EVERY DAY    amitriptyline (ELAVIL) 25 mg tablet TAKE 1 TABLET EVERY NIGHT    alcohol swabs (BD Single Use Swabs Regular) padm USE EVERY DAY    glucose blood VI test strips (Accu-Chek Colette Plus test strp) strip Test BS daily and PRN   Dx code E11.65    Blood Glucose Control High&Low (ACCU-CHEK COLETTE CONTROL SOLN) soln Use as directed    Blood-Glucose Meter (ACCU-CHEK COLETTE CONNECT METER) [facility] [place] E11.65 - BG testing daily and PRN    acetaminophen (TYLENOL) 325 mg tablet Take 650 mg by mouth every four (4) hours as needed for Pain. clotrimazole-betamethasone (LOTRISONE) topical cream apply to affected area twice a day    ondansetron (ZOFRAN ODT) 4 mg disintegrating tablet Take 1 Tab by mouth every eight (8) hours as needed for Nausea.     tiZANidine (ZANAFLEX) 4 mg tablet take 1 tablet by mouth every 12 hours if needed FOR muscle spasm (Patient not taking: Reported on 11/29/2022)    chlorhexidine (PERIDEX) 0.12 % solution RINSE MOUTH WITH 15 MLS (1 CAPFUL) twice a day for 5 days (Patient not taking: Reported on 11/29/2022)    fluticasone propionate (FLONASE) 50 mcg/actuation nasal spray 1 Spray daily. (Patient not taking: Reported on 11/29/2022)    loratadine (CLARITIN) 10 mg tablet Take 10 mg by mouth daily. (Patient not taking: Reported on 11/29/2022)    penicillin v potassium (VEETID) 500 mg tablet take 1 tablet by mouth four times a day for 7 days (Patient not taking: Reported on 11/29/2022)     No current facility-administered medications for this visit. [facility] follow up appointment(s): No future appointments. Non-[facility] follow up appointment(s): Yes Dr Lawrence Hunter ,Endocrinology 12/9/22     Goals Addressed                   This Visit's Progress       Diabetes     Reduce risk of comorbid complications related to diabetes (renal disease, heart disease, amputation, and retinopathy). Patient will take medications Metformin 750 twice daily  Patient will attend Endocrinology appointments  Patient will maintain ADA ,carb no concentrated sweets diet  Patient will strive to get HGA1c down < 7.0 ( Current 8.7)         Skills necessary to properly manage their diabetes, including use of devices and symptom monitoring tools. Patient will check FSB as directed Has f/u with Endocrinology 1/9/23         General     Attends follow up appointments on schedule        Patient will attend all providers f/u and scheduled appointments  PCP  Cardiology  Endocrinology        Takes all medications as ordered        Patient will take all medications as prescribed         Post ED     Coordinate Pain Management Plan for Patient. Patient will attend PT as scheduled 12/6/22 S/P Low back pain with Sciatica  Take Tylenol extra strength as directed  Use Lidoderm 5% patches on 12 hours off 12 hours  Medrol Yasir as prescribed. Establish PCP relationships and regularly scheduled appointments.         PAtient will contact PCP for direction to go to ED or Urgent care       Reduce ED Utilization        Patient will follow up with post ED protocol:  PT, Pain management measures  ACM will monitor x 30 days

## 2022-11-30 ENCOUNTER — PATIENT OUTREACH (OUTPATIENT)
Dept: CASE MANAGEMENT | Age: 57
End: 2022-11-30

## 2022-11-30 NOTE — PROGRESS NOTES
.Complex Case Management      Date/Time:  2022 1:26 PM    Method of communication with patient:phone    [facility] ([facility]) contacted the caregiver ,wife Marlen Patterson on [place] by telephone to perform Ambulatory Care Coordination. Verified name and  (PHI) with caregiver as identifiers. Provided introduction to self, and explanation of the Ambulatory Care Manager's role. Reviewed most recent clinic visit w/ caregiver who verbalized understanding. Caregiver given an opportunity to ask questions. Top Challenges reviewed with the Provider   Wife given Office visit for 22 @ 1:30 with PCP. Voicing she was getting ready to take him back to the ED, but since it is a matter of 24 hours she feels he can hold out with the Tylenol Arthritic strength and heat. The caregiver agrees to contact the PCP office or the [facility] for questions related to their healthcare. Provided contact information for future reference. Barriers to care? Pain management    Advance Care Planning:   Does patient have an Advance Directive:  not on file     Medication(s):   Medication reconciliation was not performed with caregiver, who verbalizes understanding of administration of home medications. Current Outpatient Medications   Medication Sig    lidocaine (LIDODERM) 5 % 1 Patch by TransDERmal route every twelve (12) hours. methylPREDNISolone (MEDROL DOSEPACK) 4 mg tablet Take 1 Tablet by mouth as directed.     hydroCHLOROthiazide (HYDRODIURIL) 12.5 mg tablet TAKE 1 TABLET EVERY DAY    rosuvastatin (CRESTOR) 20 mg tablet TAKE 1 TABLET AT BEDTIME    tiZANidine (ZANAFLEX) 4 mg tablet take 1 tablet by mouth every 12 hours if needed FOR muscle spasm (Patient not taking: Reported on 2022)    chlorhexidine (PERIDEX) 0.12 % solution RINSE MOUTH WITH 15 MLS (1 CAPFUL) twice a day for 5 days (Patient not taking: Reported on 2022)    fluticasone propionate (FLONASE) 50 mcg/actuation nasal spray 1 Spray daily. (Patient not taking: Reported on 11/29/2022)    loratadine (CLARITIN) 10 mg tablet Take 10 mg by mouth daily. (Patient not taking: Reported on 11/29/2022)    penicillin v potassium (VEETID) 500 mg tablet take 1 tablet by mouth four times a day for 7 days (Patient not taking: Reported on 11/29/2022)    metFORMIN ER (GLUCOPHAGE XR) 750 mg tablet Take 750 mg by mouth two (2) times a day. levETIRAcetam (Keppra) 500 mg tablet Take 1 Tablet by mouth two (2) times a day. Accu-Chek Fastclix Lancet Drum misc TEST BLOOD SUGAR EVERY DAY    lisinopriL (PRINIVIL, ZESTRIL) 30 mg tablet TAKE 1 TABLET EVERY DAY    amitriptyline (ELAVIL) 25 mg tablet TAKE 1 TABLET EVERY NIGHT    alcohol swabs (BD Single Use Swabs Regular) padm USE EVERY DAY    glucose blood VI test strips (Accu-Chek Colette Plus test strp) strip Test BS daily and PRN   Dx code E11.65    Blood Glucose Control High&Low (ACCU-CHEK COLETTE CONTROL SOLN) soln Use as directed    Blood-Glucose Meter (ACCU-CHEK COLETTE CONNECT METER) misc E11.65 - BG testing daily and PRN    acetaminophen (TYLENOL) 325 mg tablet Take 650 mg by mouth every four (4) hours as needed for Pain. clotrimazole-betamethasone (LOTRISONE) topical cream apply to affected area twice a day    ondansetron (ZOFRAN ODT) 4 mg disintegrating tablet Take 1 Tab by mouth every eight (8) hours as needed for Nausea. No current facility-administered medications for this visit.        [facility] follow up appointment(s):   Future Appointments   Date Time Provider Rosalind Jaquezi   12/1/2022  1:45 PM Helene Osgood, MD [facility]        Non-[facility] follow up appointment(s): No     Goals Addressed    None

## 2022-11-30 NOTE — PROGRESS NOTES
After speaking w/NCM Johnny Friend, pt is scheduled on:   Future Appointments   Date Time Provider Rosalind Phuong   12/1/2022  1:45 PM Dainelle Thomason MD [facility]

## 2022-12-01 ENCOUNTER — OFFICE VISIT (OUTPATIENT)
Dept: FAMILY MEDICINE CLINIC | Age: 57
End: 2022-12-01
Payer: MEDICARE

## 2022-12-01 VITALS
HEIGHT: 73 IN | DIASTOLIC BLOOD PRESSURE: 76 MMHG | WEIGHT: 215.6 LBS | TEMPERATURE: 97.7 F | OXYGEN SATURATION: 98 % | RESPIRATION RATE: 16 BRPM | HEART RATE: 85 BPM | SYSTOLIC BLOOD PRESSURE: 110 MMHG | BODY MASS INDEX: 28.57 KG/M2

## 2022-12-01 DIAGNOSIS — M54.50 CHRONIC BILATERAL LOW BACK PAIN WITHOUT SCIATICA: ICD-10-CM

## 2022-12-01 DIAGNOSIS — M47.26 OSTEOARTHRITIS OF SPINE WITH RADICULOPATHY, LUMBAR REGION: ICD-10-CM

## 2022-12-01 DIAGNOSIS — R35.0 FREQUENCY OF MICTURITION: Primary | ICD-10-CM

## 2022-12-01 DIAGNOSIS — M54.2 CERVICAL PAIN: ICD-10-CM

## 2022-12-01 DIAGNOSIS — I10 ESSENTIAL HYPERTENSION: Chronic | ICD-10-CM

## 2022-12-01 DIAGNOSIS — G89.29 CHRONIC BILATERAL LOW BACK PAIN WITHOUT SCIATICA: ICD-10-CM

## 2022-12-01 DIAGNOSIS — M51.36 DDD (DEGENERATIVE DISC DISEASE), LUMBAR: ICD-10-CM

## 2022-12-01 LAB
BILIRUB UR QL STRIP: ABNORMAL
GLUCOSE UR-MCNC: NEGATIVE MG/DL
KETONES P FAST UR STRIP-MCNC: ABNORMAL MG/DL
PH UR STRIP: 5.5 [PH] (ref 4.6–8)
PROT UR QL STRIP: ABNORMAL
SP GR UR STRIP: 1.03 (ref 1–1.03)
UA UROBILINOGEN AMB POC: ABNORMAL (ref 0.2–1)
URINALYSIS CLARITY POC: ABNORMAL
URINALYSIS COLOR POC: ABNORMAL
URINE BLOOD POC: ABNORMAL
URINE LEUKOCYTES POC: NEGATIVE
URINE NITRITES POC: NEGATIVE

## 2022-12-01 PROCEDURE — 81003 URINALYSIS AUTO W/O SCOPE: CPT | Performed by: LEGAL MEDICINE

## 2022-12-01 PROCEDURE — 3078F DIAST BP <80 MM HG: CPT | Performed by: LEGAL MEDICINE

## 2022-12-01 PROCEDURE — 3074F SYST BP LT 130 MM HG: CPT | Performed by: LEGAL MEDICINE

## 2022-12-01 PROCEDURE — G8752 SYS BP LESS 140: HCPCS | Performed by: LEGAL MEDICINE

## 2022-12-01 PROCEDURE — G8510 SCR DEP NEG, NO PLAN REQD: HCPCS | Performed by: LEGAL MEDICINE

## 2022-12-01 PROCEDURE — 99214 OFFICE O/P EST MOD 30 MIN: CPT | Performed by: LEGAL MEDICINE

## 2022-12-01 PROCEDURE — G8754 DIAS BP LESS 90: HCPCS | Performed by: LEGAL MEDICINE

## 2022-12-01 PROCEDURE — 3017F COLORECTAL CA SCREEN DOC REV: CPT | Performed by: LEGAL MEDICINE

## 2022-12-01 PROCEDURE — G8419 CALC BMI OUT NRM PARAM NOF/U: HCPCS | Performed by: LEGAL MEDICINE

## 2022-12-01 PROCEDURE — G8427 DOCREV CUR MEDS BY ELIG CLIN: HCPCS | Performed by: LEGAL MEDICINE

## 2022-12-01 RX ORDER — TIZANIDINE 4 MG/1
TABLET ORAL
Qty: 60 TABLET | Refills: 0 | Status: SHIPPED | OUTPATIENT
Start: 2022-12-01

## 2022-12-01 RX ORDER — DEXTROMETHORPHAN HYDROBROMIDE, GUAIFENESIN 5; 100 MG/5ML; MG/5ML
1300 LIQUID ORAL
Qty: 100 TABLET | Refills: 0 | Status: SHIPPED | OUTPATIENT
Start: 2022-12-01 | End: 2022-12-31

## 2022-12-01 RX ORDER — LISINOPRIL 30 MG/1
TABLET ORAL
Qty: 90 TABLET | Refills: 1 | Status: SHIPPED | OUTPATIENT
Start: 2022-12-01

## 2022-12-01 NOTE — PROGRESS NOTES
Lisandra Fox is a 59 y.o. male (: 1965) presenting to address:    Chief Complaint   Patient presents with    ED Follow-up     [facility] ED for pain back, left side, radiating down left leg       Vitals:    22 1341   BP: 110/76   Pulse: 85   Resp: 16   Temp: 97.7 °F (36.5 °C)   TempSrc: Temporal   SpO2: 98%   Weight: 215 lb 9.6 oz (97.8 kg)   Height: 6' 1\" (1.854 m)   PainSc:  10 - Worst pain ever   PainLoc: Leg       Hearing/Vision:   No results found. Learning Assessment:     Learning Assessment 2015   PRIMARY LEARNER Patient   HIGHEST LEVEL OF EDUCATION - PRIMARY LEARNER  GRADUATED HIGH SCHOOL OR GED   BARRIERS PRIMARY LEARNER NONE   CO-LEARNER CAREGIVER No   PRIMARY LANGUAGE ENGLISH   LEARNER PREFERENCE PRIMARY OTHER (COMMENT)   ANSWERED BY patient   RELATIONSHIP SELF     Depression Screening:     3 most recent PHQ Screens 2022   Little interest or pleasure in doing things Not at all   Feeling down, depressed, irritable, or hopeless Not at all   Total Score PHQ 2 0     Fall Risk Assessment:     Fall Risk Assessment, last 12 mths 10/13/2022   Able to walk? Yes   Fall in past 12 months? 0   Do you feel unsteady? 0   Are you worried about falling -   Is TUG test greater than 12 seconds? -   Is the gait abnormal? -   Number of falls in past 12 months -   Fall with injury? -     Abuse Screening:     Abuse Screening Questionnaire 10/13/2022   Do you ever feel afraid of your partner? N   Are you in a relationship with someone who physically or mentally threatens you? N   Is it safe for you to go home?  Y     ADL Assessment:     ADL Assessment 2016   Feeding yourself No Help Needed   Getting from bed to chair No Help Needed   Getting dressed Help Needed   Bathing or showering No Help Needed   Walk across the room (includes cane/walker) No Help Needed   Using the telphone No Help Needed   Taking your medications No Help Needed   Preparing meals No Help Needed   Managing money (expenses/bills) No Help Needed   Moderately strenuous housework (laundry) Help Needed   Shopping for personal items (toiletries/medicines) Help Needed   Shopping for groceries Help Needed   Driving Help Needed   Climbing a flight of stairs No Help Needed   Getting to places beyond walking distances No Help Needed        Coordination of Care Questionaire:   1. \"Have you been to the ER, urgent care clinic since your last visit? Hospitalized since your last visit? \"  [facility] ER for sciatica pain    2. \"Have you seen or consulted any other health care providers outside of the [address] [place] [address] since your last visit? \"  neurology      3. For patients aged 39-70: Has the patient had a colonoscopy / FIT/ Cologuard? Yes - no Care Gap present    If the patient is female:    4. For patients aged 41-77: Has the patient had a mammogram within the past 2 years? NA - based on age or sex  See top three    5. For patients aged 21-65: Has the patient had a pap smear? NA - based on age or sex    Advanced Directive:   1. Do you have an Advanced Directive? YES    2. Would you like information on Advanced Directives?  NO

## 2022-12-01 NOTE — PROGRESS NOTES
Vamshi Bravo     Chief Complaint   Patient presents with    ED Follow-up     [facility] ED for pain back, left side, radiating down left leg     Vitals:    12/01/22 1341   BP: 110/76   Pulse: 85   Resp: 16   Temp: 97.7 °F (36.5 °C)   TempSrc: Temporal   SpO2: 98%   Weight: 215 lb 9.6 oz (97.8 kg)   Height: 6' 1\" (1.854 m)   PainSc:  10 - Worst pain ever   PainLoc: Leg         HPI:Hspital follow up with his wife after 2 ER visits at [facility]  11/25 , 11/24 for low back pian with radiation to the left leg    Still in pain he is getting his ER RX on lidocaine patch and medrol dose pack today   ER encounters has been reviewed     Past Medical History:   Diagnosis Date    Allergic rhinitis due to pollen     Diverticulosis Jan 2016    Colonoscopy - Dr Corky Chen hypertension     Helicobacter pylori gastritis 7/22/2015    Clarithromycin, Amoxil, and Omeprazole BID x 14 days prescribed    Hemorrhoid Jan 2016    Colonoscopy - Dr Benitez Valencia    Herniated disc     per pt multiple levels    History of lumbar fusion 1/8/2016    Influenza vaccination declined by patient - 12/26/17 12/26/2017    Noncompliance with treatment plan     Pancreatitis     PUD (peptic ulcer disease) Jan 2016    No H Pylori on biopsy, multiple small ulcers on EGD (Dr. Kenyon Hoang)    Scleral icterus 7/9/2015     Past Surgical History:   Procedure Laterality Date    HX COLONOSCOPY N/A Jan 2016    Dr. Kenyon Hoang - hemorrhoids and diverticulosis, 10 year interval    HX ENDOSCOPY N/A Jan 2016    Dr. Kenyon Hoang - multiple small ulcers, neg H pylori    HX LUMBAR FUSION       Social History     Tobacco Use    Smoking status: Some Days     Types: Cigars    Smokeless tobacco: Never    Tobacco comments:     Pt smokes Black & Milds sometimes   Substance Use Topics    Alcohol use: Not Currently     Alcohol/week: 2.5 standard drinks     Types: 3 Standard drinks or equivalent per week     Comment: 5/2019 stopped drinking a few months ago and lost 30#s       Family History Problem Relation Age of Onset    Diabetes Mother     Hypertension Father     Stroke Sister     Diabetes Sister        Review of Systems   Constitutional:  Negative for chills, fever, malaise/fatigue and weight loss. HENT:  Negative for congestion, ear discharge, ear pain, hearing loss and nosebleeds. Eyes:  Negative for blurred vision, double vision and discharge. Respiratory:  Negative for cough. Cardiovascular:  Negative for chest pain, palpitations, claudication and leg swelling. Gastrointestinal:  Negative for abdominal pain, constipation, diarrhea, nausea and vomiting. Genitourinary:  Positive for dysuria and frequency. Negative for flank pain, hematuria and urgency. Musculoskeletal:  Positive for back pain. Negative for falls, joint pain and myalgias. Skin:  Negative for itching and rash. Neurological:  Positive for tingling and sensory change. Negative for dizziness, speech change, focal weakness, weakness and headaches. Psychiatric/Behavioral:  Negative for depression and suicidal ideas. Physical Exam  Vitals and nursing note reviewed. Constitutional:       General: He is not in acute distress. Appearance: Normal appearance. He is well-developed. He is not ill-appearing, toxic-appearing or diaphoretic. HENT:      Head: Normocephalic and atraumatic. Eyes:      General: No scleral icterus. Right eye: No discharge. Left eye: No discharge. Conjunctiva/sclera: Conjunctivae normal.      Pupils: Pupils are equal, round, and reactive to light. Neck:      Thyroid: No thyromegaly. Cardiovascular:      Rate and Rhythm: Normal rate and regular rhythm. Heart sounds: Normal heart sounds. Pulmonary:      Effort: Pulmonary effort is normal. No respiratory distress. Breath sounds: Normal breath sounds. No rales. Abdominal:      General: Bowel sounds are normal. There is no distension. Palpations: Abdomen is soft. There is no mass. Tenderness: There is no abdominal tenderness. There is no rebound. Musculoskeletal:         General: Tenderness present. No deformity. Normal range of motion. Cervical back: Normal range of motion. No rigidity or tenderness. Right lower leg: No edema. Left lower leg: No edema. Comments: Low back tenderness   Lymphadenopathy:      Cervical: No cervical adenopathy. Skin:     General: Skin is warm and dry. Findings: No erythema or rash. Neurological:      Mental Status: He is alert and oriented to person, place, and time. Cranial Nerves: No cranial nerve deficit. Coordination: Coordination normal.      Gait: Gait normal.   Psychiatric:         Mood and Affect: Mood normal.         Behavior: Behavior normal.         Thought Content: Thought content normal.         Judgment: Judgment normal.        Assessment and plan     Plan of care has been discussed with the patient, he agrees to the plan and verbalized understanding. All his questions were answered  More than 50% of the time spent in this visit was counseling the patient about  illness and treatment options         1. Cervical pain    - tiZANidine (ZANAFLEX) 4 mg tablet; take 1 tablet by mouth every 12 hours if needed FOR muscle spasm  Dispense: 60 Tablet; Refill: 0    2. Chronic bilateral low back pain without sciatica  He will be starting physical therapy     - tiZANidine (ZANAFLEX) 4 mg tablet; take 1 tablet by mouth every 12 hours if needed FOR muscle spasm  Dispense: 60 Tablet; Refill: 0  - REFERRAL TO ORTHOPEDICS    3. Essential hypertension  BP is well controlled  on lisinopril   - lisinopriL (PRINIVIL, ZESTRIL) 30 mg tablet; TAKE 1 TABLET EVERY DAY  Dispense: 90 Tablet; Refill: 1    4. Frequency of micturition  Urinalysis showed blood and protein and bilirubin to send it for culture   - AMB POC URINALYSIS DIP STICK AUTO W/O MICRO  - CULTURE, URINE; Future    5.  DDD (degenerative disc disease), lumbar    - acetaminophen (TYLENOL) 650 mg TbER; Take 2 Tablets by mouth two (2) times daily as needed for Pain for up to 30 days. Dispense: 100 Tablet; Refill: 0    6. Osteoarthritis of spine with radiculopathy, lumbar region    - acetaminophen (TYLENOL) 650 mg TbER; Take 2 Tablets by mouth two (2) times daily as needed for Pain for up to 30 days. Dispense: 100 Tablet; Refill: 0  - REFERRAL TO ORTHOPEDICS  Current Outpatient Medications   Medication Sig Dispense Refill    tiZANidine (ZANAFLEX) 4 mg tablet take 1 tablet by mouth every 12 hours if needed FOR muscle spasm 60 Tablet 0    lisinopriL (PRINIVIL, ZESTRIL) 30 mg tablet TAKE 1 TABLET EVERY DAY 90 Tablet 1    acetaminophen (TYLENOL) 650 mg TbER Take 2 Tablets by mouth two (2) times daily as needed for Pain for up to 30 days. 100 Tablet 0    lidocaine (LIDODERM) 5 % 1 Patch by TransDERmal route every twelve (12) hours. methylPREDNISolone (MEDROL DOSEPACK) 4 mg tablet Take 1 Tablet by mouth as directed. hydroCHLOROthiazide (HYDRODIURIL) 12.5 mg tablet TAKE 1 TABLET EVERY DAY 90 Tablet 1    rosuvastatin (CRESTOR) 20 mg tablet TAKE 1 TABLET AT BEDTIME 90 Tablet 3    chlorhexidine (PERIDEX) 0.12 % solution       fluticasone propionate (FLONASE) 50 mcg/actuation nasal spray 1 Spray daily. loratadine (CLARITIN) 10 mg tablet Take 10 mg by mouth daily. metFORMIN ER (GLUCOPHAGE XR) 750 mg tablet Take 750 mg by mouth two (2) times a day. levETIRAcetam (Keppra) 500 mg tablet Take 1 Tablet by mouth two (2) times a day.  60 Tablet 0    Accu-Chek Fastclix Lancet Drum misc TEST BLOOD SUGAR EVERY  Each 5    amitriptyline (ELAVIL) 25 mg tablet TAKE 1 TABLET EVERY NIGHT 90 Tablet 3    alcohol swabs (BD Single Use Swabs Regular) padm USE EVERY  Pad 11    glucose blood VI test strips (Accu-Chek Colette Plus test strp) strip Test BS daily and PRN   Dx code E11.65 100 Strip 11    Blood Glucose Control High&Low (ACCU-CHEK COLETTE CONTROL SOLN) soln Use as directed 1 Bottle 0    Blood-Glucose Meter (ACCU-CHEK TRISTA CONNECT METER) [facility] E11.65 - BG testing daily and PRN 1 Each 0    clotrimazole-betamethasone (LOTRISONE) topical cream apply to affected area twice a day 30 g 1    ondansetron (ZOFRAN ODT) 4 mg disintegrating tablet Take 1 Tab by mouth every eight (8) hours as needed for Nausea.  40 Tab 1    penicillin v potassium (VEETID) 500 mg tablet take 1 tablet by mouth four times a day for 7 days (Patient not taking: No sig reported)         Patient Active Problem List    Diagnosis Date Noted    Diastasis recti 12/21/2021    History of peptic ulcer 01/23/2020    Postconcussion syndrome 01/23/2020    Hypertensive left ventricular hypertrophy, without heart failure 01/23/2020    Type 2 diabetes mellitus without complication, without long-term current use of insulin ([facility] [address] 01/23/2020    Influenza vaccination declined by patient 12/06/2018    Headache disorder 12/25/2017    Mixed hyperlipidemia 06/08/2013    Essential hypertension     Chronic bilateral low back pain without sciatica      Results for orders placed or performed in visit on 12/01/22   AMB POC URINALYSIS DIP STICK AUTO W/O MICRO   Result Value Ref Range    Color (UA POC) Dark Yellow     Clarity (UA POC) Cloudy     Glucose (UA POC) Negative Negative    Bilirubin (UA POC) 1+ Negative    Ketones (UA POC) Trace Negative    Specific gravity (UA POC) 1.030 1.001 - 1.035    Blood (UA POC) 1+ Negative    pH (UA POC) 5.5 4.6 - 8.0    Protein (UA POC) 1+ Negative    Urobilinogen (UA POC) 0.2 mg/dL 0.2 - 1    Nitrites (UA POC) Negative Negative    Leukocyte esterase (UA POC) Negative Negative     Office Visit on 12/01/2022   Component Date Value Ref Range Status    Color (UA POC) 12/01/2022 Dark Yellow   Final    Clarity (UA POC) 12/01/2022 Cloudy   Final    Glucose (UA POC) 12/01/2022 Negative  Negative Final    Bilirubin (UA POC) 12/01/2022 1+  Negative Final    Ketones (UA POC) 12/01/2022 Trace  Negative Final Specific gravity (UA POC) 12/01/2022 1.030  1.001 - 1.035 Final    Blood (UA POC) 12/01/2022 1+  Negative Final    pH (UA POC) 12/01/2022 5.5  4.6 - 8.0 Final    Protein (UA POC) 12/01/2022 1+  Negative Final    Urobilinogen (UA POC) 12/01/2022 0.2 mg/dL  0.2 - 1 Final    Nitrites (UA POC) 12/01/2022 Negative  Negative Final    Leukocyte esterase (UA POC) 12/01/2022 Negative  Negative Final   Appointment on 10/13/2022   Component Date Value Ref Range Status    Hemoglobin A1c 10/13/2022 8.4 (A)  4.8 - 5.6 % Final    Comment:          Prediabetes: 5.7 - 6.4           Diabetes: >6.4           Glycemic control for adults with diabetes: <7.0      Cholesterol, total 10/13/2022 84 (A)  100 - 199 mg/dL Final    Triglyceride 10/13/2022 103  0 - 149 mg/dL Final    HDL Cholesterol 10/13/2022 31 (A)  >39 mg/dL Final    VLDL, calculated 10/13/2022 20  5 - 40 mg/dL Final    LDL, calculated 10/13/2022 33  0 - 99 mg/dL Final    WBC 10/13/2022 7.1  3.4 - 10.8 x10E3/uL Final    RBC 10/13/2022 4.72  4.14 - 5.80 x10E6/uL Final    HGB 10/13/2022 13.5  13.0 - 17.7 g/dL Final    HCT 10/13/2022 41.7  37.5 - 51.0 % Final    MCV 10/13/2022 88  79 - 97 fL Final    MCH 10/13/2022 28.6  26.6 - 33.0 pg Final    MCHC 10/13/2022 32.4  31.5 - 35.7 g/dL Final    RDW 10/13/2022 13.2  11.6 - 15.4 % Final    PLATELET 82/83/1005 905  150 - 450 x10E3/uL Final    Glucose 10/13/2022 182 (A)  70 - 99 mg/dL Final                  **Please note reference interval change**    BUN 10/13/2022 10  6 - 24 mg/dL Final    Creatinine 10/13/2022 1.04  0.76 - 1.27 mg/dL Final    eGFR 10/13/2022 83  >59 mL/min/1.73 Final    BUN/Creatinine ratio 10/13/2022 10  9 - 20 Final    Sodium 10/13/2022 139  134 - 144 mmol/L Final    Potassium 10/13/2022 4.1  3.5 - 5.2 mmol/L Final    Chloride 10/13/2022 102  96 - 106 mmol/L Final    CO2 10/13/2022 21  20 - 29 mmol/L Final    Calcium 10/13/2022 9.2  8.7 - 10.2 mg/dL Final    Protein, total 10/13/2022 6.7  6.0 - 8.5 g/dL Final Albumin 10/13/2022 4.6  3.8 - 4.9 g/dL Final    GLOBULIN, TOTAL 10/13/2022 2.1  1.5 - 4.5 g/dL Final    A-G Ratio 10/13/2022 2.2  1.2 - 2.2 Final    Bilirubin, total 10/13/2022 0.4  0.0 - 1.2 mg/dL Final    Alk. phosphatase 10/13/2022 69  44 - 121 IU/L Final    AST (SGOT) 10/13/2022 14  0 - 40 IU/L Final    ALT (SGPT) 10/13/2022 16  0 - 44 IU/L Final    INTERPRETATION 10/13/2022 Note   Final    Supplemental report is available. Admission on 10/08/2022, Discharged on 10/09/2022   Component Date Value Ref Range Status    Ventricular Rate 10/08/2022 81  BPM Final    Atrial Rate 10/08/2022 81  BPM Final    P-R Interval 10/08/2022 178  ms Final    QRS Duration 10/08/2022 82  ms Final    Q-T Interval 10/08/2022 416  ms Final    QTC Calculation (Bezet) 10/08/2022 483  ms Final    Calculated P Axis 10/08/2022 52  degrees Final    Calculated R Axis 10/08/2022 28  degrees Final    Calculated T Axis 10/08/2022 79  degrees Final    Diagnosis 10/08/2022    Final                    Value:Normal sinus rhythm  Minimal voltage criteria for LVH, may be normal variant ( Sokolow-Benjamin )  Nonspecific ST and T wave abnormality  Prolonged QT  Abnormal ECG  No previous ECGs available  Confirmed by Remy Ferro M.D., Danilo ([id number]) on 10/9/2022 9:57:52 AM      WBC 10/08/2022 11.9 (A)  4.0 - 11.0 1000/mm3 Final    RBC 10/08/2022 4.66  3.80 - 5.70 M/uL Final    HGB 10/08/2022 13.4  12.4 - 17.2 gm/dl Final    HCT 10/08/2022 41.2  37.0 - 50.0 % Final    MCV 10/08/2022 88.4  80.0 - 98.0 fL Final    MCH 10/08/2022 28.8  23.0 - 34.6 pg Final    MCHC 10/08/2022 32.5  30.0 - 36.0 gm/dl Final    PLATELET 84/15/6349 011  140 - 450 1000/mm3 Final    MPV 10/08/2022 9.8  6.0 - 10.0 fL Final    RDW-SD 10/08/2022 46.8 (A)  35.1 - 43.9   Final    NRBC 10/08/2022 0  0 - 0   Final    IMMATURE GRANULOCYTES 10/08/2022 0.3  0.0 - 3.0 % Final    Comment: IG - Immature granulocytes (promyelocytes + myelocytes + metamyelocytes), their presence  indicates a left shift.  An IG > 3% may predict positive blood cultures with 98% specificity.  (P<0.04) and 92% Positive Predictive Value (Robert-Priyanka)1. Increased immature granulocytes  assist with the detection of infection and/or inflammation and may be present at an early stage  and are more sensitive and specific than band counts. NEUTROPHILS 10/08/2022 75.1 (A)  34 - 64 % Final    LYMPHOCYTES 10/08/2022 14.3 (A)  28 - 48 % Final    MONOCYTES 10/08/2022 9.1  1 - 13 % Final    EOSINOPHILS 10/08/2022 0.9  0 - 5 % Final    BASOPHILS 10/08/2022 0.3  0 - 3 % Final    Potassium 10/08/2022 3.6  3.5 - 5.1 mEq/L Final    Chloride 10/08/2022 101  98 - 107 mEq/L Final    Sodium 10/08/2022 135 (A)  136 - 145 mEq/L Final    CO2 10/08/2022 26  20 - 31 mEq/L Final    Glucose 10/08/2022 269 (A)  74 - 106 mg/dl Final    BUN 10/08/2022 7 (A)  9 - 23 mg/dl Final    Creatinine 10/08/2022 1.19  0.70 - 1.30 mg/dl Final    GFR est AA 10/08/2022 >60    Final    Comment: THE [facility] [employer] STATES THAT THE MDRD STUDY EQUATION SHOULD ONLY BE USED ON  INDIVIDUALS 18 OR OLDER. THE REPORT ALSO NOTES THAT THE MDRD STUDY EQUATION HAS NOT BEEN  VALIDATED FOR USE WITH THE ELDERLY (OVER 79YEARS OF AGE), PREGNANT WOMEN, PATIENTS WITH SERIOUS  COMORBID CONDITIONS, OR PERSONS WITH EXTREMES OF BODY SIZE, MUSCLE MASS, OR NUTRITIONAL STATUS. APPLICATION OF THE EQUATION TO THESE PATIENT GROUPS MAY LEAD TO ERRORS IN GFR ESTIMATION. GFR  ESTIMATING EQUATIONS HAVE POORER AGREEMENT WITH MEASURED GFR FOR ILL HOSPITALIZED PATIENTS AND  FOR PEOPLE WITH NEAR NORMAL KIDNEY FUNCTION THAN FOR SUBJECTS IN THE MDRD STUDY. VALIDATION  STUDIES ARE IN PROGRESS TO EVALUATE THE MDRD STUDY EQUATION FOR ADDITIONAL ETHNIC GROUPS, THE  ELDERLY, VARIOUS DISEASE CONDITIONS, AND PEOPLE WITH NORMAL KIDNEY FUNCTION. GFRA----REFERS TO   GFRO---REFERS TO OTHER RACES    REFERENCES AVAILABLE UPON REQUEST.         GFR est non-AA 10/08/2022 >60    Final    Calcium 10/08/2022 9.3  8.7 - 10.4 mg/dl Final    Anion gap 10/08/2022 9  5 - 15 mmol/L Final    Glucose (POC) 10/08/2022 197 (A)  65 - 105 mg/dL Final    Comment: Notified Nurse  : [id number]      Troponin-High Sensitivity 10/08/2022 43  0 - 53 ng/L Final    Comment: Up to 50% of normal patients may have low levels of detectable troponin (within reference range)  circulating in the blood. Mild elevations in troponin that are above the reference range may  present with other cardiac and non-cardiac disease states. Two or three serial tests are  recommended to evaluate changes in circulating troponin over time. Note: The reference range is based on the 99th percentile of combined male and female  population. Troponin-High Sensitivity 10/08/2022 41  0 - 53 ng/L Final    Comment: Up to 50% of normal patients may have low levels of detectable troponin (within reference range)  circulating in the blood. Mild elevations in troponin that are above the reference range may  present with other cardiac and non-cardiac disease states. Two or three serial tests are  recommended to evaluate changes in circulating troponin over time. Note: The reference range is based on the 99th percentile of combined male and female  population.       Glucose (POC) 10/08/2022 159 (A)  65 - 105 mg/dL Final    Comment: Notified Nurse  : [id number]      Glucose (POC) 10/09/2022 225 (A)  65 - 105 mg/dL Final    Comment: Notified Nurse  Notified MD  : [id number]      Glucose (POC) 10/09/2022 149 (A)  65 - 105 mg/dL Final    Comment: Notified Nurse  : [id number]

## 2022-12-02 NOTE — PROGRESS NOTES
Informed pt urine was sent out for culture to evaluate for infection; pt will be contacted w/results; pt voiced understanding.

## 2022-12-07 ENCOUNTER — PATIENT OUTREACH (OUTPATIENT)
Dept: CASE MANAGEMENT | Age: 57
End: 2022-12-07

## 2022-12-07 NOTE — PROGRESS NOTES
.Complex Case Management      Date/Time:  2022 2:06 PM    Method of communication with patient:phone    [facility] ([facility]) contacted the patient by telephone to perform Ambulatory Care Coordination. Verified name and  (PHI) with patient as identifiers. Provided introduction to self, and explanation of the Ambulatory Care Manager's role. Reviewed most recent clinic visit w/ patient who verbalized understanding. Patient given an opportunity to ask questions. Top Challenges reviewed with the Provider   Still have some discomfort but not as much. Physical therapy is scheduled for 22     The patient agrees to contact the PCP office or the [facility] for questions related to their healthcare. Provided contact information for future reference. Disease Specific:   N/A    Home Health Active: No    DME Active: No    Barriers to care? lack of knowledge about disease, level of motivation, support system, transportation, utilization of services    Advance Care Planning:   Does patient have an Advance Directive:  not on file; education provided     Medication(s):   Medication reconciliation was not performed with patient, as it was done last week. Referral to Pharm D needed: no     Current Outpatient Medications   Medication Sig    tiZANidine (ZANAFLEX) 4 mg tablet take 1 tablet by mouth every 12 hours if needed FOR muscle spasm    lisinopriL (PRINIVIL, ZESTRIL) 30 mg tablet TAKE 1 TABLET EVERY DAY    acetaminophen (TYLENOL) 650 mg TbER Take 2 Tablets by mouth two (2) times daily as needed for Pain for up to 30 days. lidocaine (LIDODERM) 5 % 1 Patch by TransDERmal route every twelve (12) hours. methylPREDNISolone (MEDROL DOSEPACK) 4 mg tablet Take 1 Tablet by mouth as directed.     hydroCHLOROthiazide (HYDRODIURIL) 12.5 mg tablet TAKE 1 TABLET EVERY DAY    rosuvastatin (CRESTOR) 20 mg tablet TAKE 1 TABLET AT BEDTIME    chlorhexidine (PERIDEX) 0.12 % solution     fluticasone propionate (FLONASE) 50 mcg/actuation nasal spray 1 Spray daily. loratadine (CLARITIN) 10 mg tablet Take 10 mg by mouth daily. penicillin v potassium (VEETID) 500 mg tablet take 1 tablet by mouth four times a day for 7 days (Patient not taking: No sig reported)    metFORMIN ER (GLUCOPHAGE XR) 750 mg tablet Take 750 mg by mouth two (2) times a day. levETIRAcetam (Keppra) 500 mg tablet Take 1 Tablet by mouth two (2) times a day. Accu-Chek Fastclix Lancet Drum misc TEST BLOOD SUGAR EVERY DAY    amitriptyline (ELAVIL) 25 mg tablet TAKE 1 TABLET EVERY NIGHT    alcohol swabs (BD Single Use Swabs Regular) padm USE EVERY DAY    glucose blood VI test strips (Accu-Chek Colette Plus test strp) strip Test BS daily and PRN   Dx code E11.65    Blood Glucose Control High&Low (ACCU-CHEK COLETTE CONTROL SOLN) soln Use as directed    Blood-Glucose Meter (ACCU-CHEK COLETTE CONNECT METER) [facility] - BG testing daily and PRN    clotrimazole-betamethasone (LOTRISONE) topical cream apply to affected area twice a day    ondansetron (ZOFRAN ODT) 4 mg disintegrating tablet Take 1 Tab by mouth every eight (8) hours as needed for Nausea. No current facility-administered medications for this visit. [facility] follow up appointment(s): No future appointments.      Non-[facility] follow up appointment(s): No

## 2022-12-09 NOTE — PROGRESS NOTES
LM for pt, urine culture positive for infection, ABX sent to pharmacy; also sent this message to pt via Twitch.

## 2022-12-14 ENCOUNTER — PATIENT OUTREACH (OUTPATIENT)
Dept: CASE MANAGEMENT | Age: 57
End: 2022-12-14

## 2022-12-14 NOTE — PROGRESS NOTES
.Complex Case Management      Date/Time:  12/14/2022 12:28 PM    Method of communication with patient:phone    [address] [facility] ([facility]) contacted the patient by telephone to perform Ambulatory Care Coordination. M left HIPPA compliant message for return call. ACM noted>>  Patient seen again in [facility] ED 12/8 for right lower back pains radiating down leg. Orders for patient as followed, Continue with Tylenol 500 mg 2 tabs 3 x a day x 10 days . Oxycodone 5 mg every 6 hours for pain not controlled by tylenol (16 tabs ) . Follow up with PCP ,follow up with Back specialist and back exercise given to patient . My chart message from PCP 12/9 for patient to  antibiotics for UTI. Top Challenges reviewed with the Provider   ACM noted>>  Patient seen again in [facility] ED 12/8 for right lower back pains radiating down leg. Orders for patient as followed, Continue with Tylenol 500 mg 2 tabs 3 x a day x 10 days . Oxycodone 5 mg every 6 hours for pain not controlled by tylenol (16 tabs ) . Follow up with PCP ,follow up with Back specialist and back exercise given to patient          Current Outpatient Medications   Medication Sig    amoxicillin-clavulanate (AUGMENTIN) 875-125 mg per tablet Take 1 Tablet by mouth every twelve (12) hours for 10 days. tiZANidine (ZANAFLEX) 4 mg tablet take 1 tablet by mouth every 12 hours if needed FOR muscle spasm    lisinopriL (PRINIVIL, ZESTRIL) 30 mg tablet TAKE 1 TABLET EVERY DAY    acetaminophen (TYLENOL) 650 mg TbER Take 2 Tablets by mouth two (2) times daily as needed for Pain for up to 30 days. lidocaine (LIDODERM) 5 % 1 Patch by TransDERmal route every twelve (12) hours. methylPREDNISolone (MEDROL DOSEPACK) 4 mg tablet Take 1 Tablet by mouth as directed.     hydroCHLOROthiazide (HYDRODIURIL) 12.5 mg tablet TAKE 1 TABLET EVERY DAY    rosuvastatin (CRESTOR) 20 mg tablet TAKE 1 TABLET AT BEDTIME    chlorhexidine (PERIDEX) 0.12 % solution     fluticasone propionate (FLONASE) 50 mcg/actuation nasal spray 1 Spray daily. loratadine (CLARITIN) 10 mg tablet Take 10 mg by mouth daily. metFORMIN ER (GLUCOPHAGE XR) 750 mg tablet Take 750 mg by mouth two (2) times a day. levETIRAcetam (Keppra) 500 mg tablet Take 1 Tablet by mouth two (2) times a day. Accu-Chek Fastclix Lancet Drum misc TEST BLOOD SUGAR EVERY DAY    amitriptyline (ELAVIL) 25 mg tablet TAKE 1 TABLET EVERY NIGHT    alcohol swabs (BD Single Use Swabs Regular) padm USE EVERY DAY    glucose blood VI test strips (Accu-Chek Colette Plus test strp) strip Test BS daily and PRN   Dx code E11.65    Blood Glucose Control High&Low (ACCU-CHEK COLETTE CONTROL SOLN) soln Use as directed    Blood-Glucose Meter (ACCU-CHEK COLETTE CONNECT METER) misc E11.65 - BG testing daily and PRN    clotrimazole-betamethasone (LOTRISONE) topical cream apply to affected area twice a day    ondansetron (ZOFRAN ODT) 4 mg disintegrating tablet Take 1 Tab by mouth every eight (8) hours as needed for Nausea. No current facility-administered medications for this visit. [facility] follow up appointment(s): No future appointments.      Non-[facility] follow up appointment(s): No     Goals Addressed    None

## 2022-12-14 NOTE — PROGRESS NOTES
.Complex Case Management      Date/Time:  2022 3:33 PM    Method of communication with patient:phone    [facility] ([facility]) contacted the  patient and wife as they were on speaker phone   by telephone to perform Ambulatory Care Coordination. Verified name and  (PHI) with  Patient and wife  as identifiers. Provided introduction to self, and explanation of the Ambulatory Care Manager's role. Reviewed most recent ED  visit w/  both  who verbalized understanding. Patient given an opportunity to ask questions. Top Challenges reviewed with the Provider   Force fluids   Complete all of antibiotics  Follow up with Back specialist as per ED. The patient agrees to contact the PCP office or the [address] [place] [address] for questions related to their healthcare. Provided contact information for future reference. Disease Specific:   N/A    Home Health Active: No    DME Active: No    Barriers to care? Pain    Advance Care Planning:   Does patient have an Advance Directive:  not on file; education provided     Medication(s):   Medication reconciliation was performed with patient, who verbalizes understanding of administration of home medications. There were no barriers to obtaining medications identified at this time. Referral to Pharm D needed: no     Current Outpatient Medications   Medication Sig    amoxicillin-clavulanate (AUGMENTIN) 875-125 mg per tablet Take 1 Tablet by mouth every twelve (12) hours for 10 days. tiZANidine (ZANAFLEX) 4 mg tablet take 1 tablet by mouth every 12 hours if needed FOR muscle spasm    lisinopriL (PRINIVIL, ZESTRIL) 30 mg tablet TAKE 1 TABLET EVERY DAY    acetaminophen (TYLENOL) 650 mg TbER Take 2 Tablets by mouth two (2) times daily as needed for Pain for up to 30 days. lidocaine (LIDODERM) 5 % 1 Patch by TransDERmal route every twelve (12) hours. methylPREDNISolone (MEDROL DOSEPACK) 4 mg tablet Take 1 Tablet by mouth as directed.     hydroCHLOROthiazide (HYDRODIURIL) 12.5 mg tablet TAKE 1 TABLET EVERY DAY    rosuvastatin (CRESTOR) 20 mg tablet TAKE 1 TABLET AT BEDTIME    chlorhexidine (PERIDEX) 0.12 % solution     fluticasone propionate (FLONASE) 50 mcg/actuation nasal spray 1 Spray daily. loratadine (CLARITIN) 10 mg tablet Take 10 mg by mouth daily. metFORMIN ER (GLUCOPHAGE XR) 750 mg tablet Take 750 mg by mouth two (2) times a day. levETIRAcetam (Keppra) 500 mg tablet Take 1 Tablet by mouth two (2) times a day. Accu-Chek Fastclix Lancet Drum misc TEST BLOOD SUGAR EVERY DAY    amitriptyline (ELAVIL) 25 mg tablet TAKE 1 TABLET EVERY NIGHT    alcohol swabs (BD Single Use Swabs Regular) padm USE EVERY DAY    glucose blood VI test strips (Accu-Chek Colette Plus test strp) strip Test BS daily and PRN   Dx code E11.65    Blood Glucose Control High&Low (ACCU-CHEK COLETTE CONTROL SOLN) soln Use as directed    Blood-Glucose Meter (ACCU-CHEK COLETTE CONNECT METER) misc E11.65 - BG testing daily and PRN    clotrimazole-betamethasone (LOTRISONE) topical cream apply to affected area twice a day    ondansetron (ZOFRAN ODT) 4 mg disintegrating tablet Take 1 Tab by mouth every eight (8) hours as needed for Nausea. No current facility-administered medications for this visit. [facility] follow up appointment(s): No future appointments.      Non-[facility] follow up appointment(s): No     Goals Addressed    None

## 2022-12-19 ENCOUNTER — PATIENT MESSAGE (OUTPATIENT)
Dept: ORTHOPEDIC SURGERY | Age: 57
End: 2022-12-19

## 2022-12-19 ENCOUNTER — PATIENT MESSAGE (OUTPATIENT)
Dept: FAMILY MEDICINE CLINIC | Age: 57
End: 2022-12-19

## 2022-12-20 ENCOUNTER — TELEPHONE (OUTPATIENT)
Dept: FAMILY MEDICINE CLINIC | Age: 57
End: 2022-12-20

## 2022-12-20 DIAGNOSIS — R56.9 SEIZURE-LIKE ACTIVITY (HCC): Primary | ICD-10-CM

## 2022-12-20 RX ORDER — LEVETIRACETAM 500 MG/1
500 TABLET ORAL 2 TIMES DAILY
Qty: 60 TABLET | Refills: 0 | Status: SHIPPED | OUTPATIENT
Start: 2022-12-20

## 2022-12-20 NOTE — TELEPHONE ENCOUNTER
From: Lisandra Fox  To: Lowell Diaz MD  Sent: 12/19/2022 11:13 PM EST  Subject: Urine Test Result    Hello. Can you please explain to me the below test result? Thank you. CHARBEL Pereira     Your Value  Proteus mirabilis  Cefazolin < =4 ug/mL

## 2022-12-20 NOTE — TELEPHONE ENCOUNTER
PT's wife called stating that pt is out of his anti seizure medication, levETIRAcetam (Keppra) 500 mg. States that she cannot get a hold of the office and that \"they\" just sit there and don't answer the phones. Pt was previously provided this medication from his neurologist, Dr Herson Fish. PT's wife would like to know if there is alternative that could help until pt gets medication. Please advise.

## 2022-12-21 NOTE — TELEPHONE ENCOUNTER
Urine is positive for infection antibiotic will be sent to the pharmacy     Proteus is a bacteria type     The rest of the report shows what antibiotics that suit the infection

## 2022-12-28 ENCOUNTER — PATIENT MESSAGE (OUTPATIENT)
Dept: FAMILY MEDICINE CLINIC | Age: 57
End: 2022-12-28

## 2022-12-28 ENCOUNTER — PATIENT OUTREACH (OUTPATIENT)
Dept: CASE MANAGEMENT | Age: 57
End: 2022-12-28

## 2022-12-28 NOTE — TELEPHONE ENCOUNTER
From: Brian Dallas  To: Lorne Guajardo MD  Sent: 12/28/2022 3:25 PM EST  Subject: Pain Treatment    Hello. During an emergency room visit on 12/09/2022 I was given Gabapentin 100mg at 3 times a day to alleviate the Sciatica that I suffer with. The medicine is working, however I ran out of the prescription given. I am requesting a refill of this medication to continue helping to relieve the Sciatica.  Thank you - Nita Mccollum

## 2022-12-28 NOTE — PROGRESS NOTES
.Complex Case Management      Date/Time:  2022 2:51 PM    Method of communication with patient:    [address] [place] [address]) contacted the patient by telephone to perform Ambulatory Care Coordination. Verified name and  (PHI) with patient as identifiers. Provided introduction to self, and explanation of the Ambulatory Care Manager's role. Reviewed most recent clinic visit w/ patient who verbalized understanding. Patient given an opportunity to ask questions. Top Challenges reviewed with the Provider   Some good days and some bad with back pain  No ED visits in 2 weeks. The patient agrees to contact the PCP office or the [address] [place] [address] for questions related to their healthcare. Provided contact information for future reference. Disease Specific:   N/A    Home Health Active: No    DME Active: No    Barriers to care? None    Advance Care Planning:   Does patient have an Advance Directive:  not on file; education provided     Medication(s):   Medication reconciliation was performed with patient, who verbalizes understanding of administration of home medications. There were no barriers to obtaining medications identified at this time. Referral to Pharm D needed: no     Current Outpatient Medications   Medication Sig    levETIRAcetam (Keppra) 500 mg tablet Take 1 Tablet by mouth two (2) times a day. tiZANidine (ZANAFLEX) 4 mg tablet take 1 tablet by mouth every 12 hours if needed FOR muscle spasm    lisinopriL (PRINIVIL, ZESTRIL) 30 mg tablet TAKE 1 TABLET EVERY DAY    acetaminophen (TYLENOL) 650 mg TbER Take 2 Tablets by mouth two (2) times daily as needed for Pain for up to 30 days. lidocaine (LIDODERM) 5 % 1 Patch by TransDERmal route every twelve (12) hours. methylPREDNISolone (MEDROL DOSEPACK) 4 mg tablet Take 1 Tablet by mouth as directed.     hydroCHLOROthiazide (HYDRODIURIL) 12.5 mg tablet TAKE 1 TABLET EVERY DAY    rosuvastatin (CRESTOR) 20 mg tablet TAKE 1 TABLET AT BEDTIME    chlorhexidine (PERIDEX) 0.12 % solution     fluticasone propionate (FLONASE) 50 mcg/actuation nasal spray 1 Spray daily. loratadine (CLARITIN) 10 mg tablet Take 10 mg by mouth daily. metFORMIN ER (GLUCOPHAGE XR) 750 mg tablet Take 750 mg by mouth two (2) times a day. Accu-Chek Fastclix Lancet Drum misc TEST BLOOD SUGAR EVERY DAY    amitriptyline (ELAVIL) 25 mg tablet TAKE 1 TABLET EVERY NIGHT    alcohol swabs (BD Single Use Swabs Regular) padm USE EVERY DAY    glucose blood VI test strips (Accu-Chek Colette Plus test strp) strip Test BS daily and PRN   Dx code E11.65    Blood Glucose Control High&Low (ACCU-CHEK COLETTE CONTROL SOLN) soln Use as directed    Blood-Glucose Meter (ACCU-CHEK COLETTE CONNECT METER) mis E11.65 - BG testing daily and PRN    clotrimazole-betamethasone (LOTRISONE) topical cream apply to affected area twice a day    ondansetron (ZOFRAN ODT) 4 mg disintegrating tablet Take 1 Tab by mouth every eight (8) hours as needed for Nausea. No current facility-administered medications for this visit.        [facility] follow up appointment(s):   Future Appointments   Date Time Provider Rosalind Phuong   1/26/2023 11:00 AM Ankit Zimmerman MD [facility] AMB        Non-[facility] follow up appointment(s): Yes Dr Nadia Waite , Endocrine     Goals Addressed    None

## 2022-12-29 NOTE — TELEPHONE ENCOUNTER
Gabapentin is a controlled medication that is a specific protocol need to be followed please to schedule appointment

## 2023-01-04 ENCOUNTER — PATIENT MESSAGE (OUTPATIENT)
Dept: FAMILY MEDICINE CLINIC | Age: 58
End: 2023-01-04

## 2023-01-04 NOTE — TELEPHONE ENCOUNTER
----- Message from Zohreh Ag sent at 1/4/2023  9:10 AM EST -----  Subject: Message to Provider    QUESTIONS  Information for Provider? Patient cannot make it today. He wants to cancel   his appointment [facility] today.   ---------------------------------------------------------------------------  --------------  [address]  [phone]; OK to leave message on voicemail  ---------------------------------------------------------------------------  --------------  SCRIPT ANSWERS  Relationship to Patient?  Self

## 2023-01-07 DIAGNOSIS — M51.36 DDD (DEGENERATIVE DISC DISEASE), LUMBAR: ICD-10-CM

## 2023-01-07 DIAGNOSIS — M47.26 OSTEOARTHRITIS OF SPINE WITH RADICULOPATHY, LUMBAR REGION: ICD-10-CM

## 2023-01-08 RX ORDER — GUAIFENESIN 100 MG/5ML
LIQUID (ML) ORAL
Qty: 100 TABLET | Refills: 0 | Status: SHIPPED | OUTPATIENT
Start: 2023-01-08

## 2023-01-09 ENCOUNTER — PATIENT OUTREACH (OUTPATIENT)
Dept: CASE MANAGEMENT | Age: 58
End: 2023-01-09

## 2023-01-09 PROBLEM — E78.5 HLD (HYPERLIPIDEMIA): Status: ACTIVE | Noted: 2022-02-20

## 2023-01-09 PROBLEM — I10 BENIGN ESSENTIAL HTN: Status: ACTIVE | Noted: 2022-02-20

## 2023-01-09 PROBLEM — R55 SYNCOPE AND COLLAPSE: Status: ACTIVE | Noted: 2022-02-20

## 2023-01-09 PROBLEM — F32.A DEPRESSION: Status: ACTIVE | Noted: 2022-02-20

## 2023-01-09 PROBLEM — E03.9 ACQUIRED HYPOTHYROIDISM: Status: ACTIVE | Noted: 2022-02-20

## 2023-01-09 PROBLEM — E11.9 DM2 (DIABETES MELLITUS, TYPE 2) (HCC): Status: ACTIVE | Noted: 2022-02-20

## 2023-01-09 PROBLEM — R53.81 MALAISE: Status: ACTIVE | Noted: 2022-09-13

## 2023-01-09 RX ORDER — OXYCODONE HYDROCHLORIDE 5 MG/1
1 TABLET ORAL
COMMUNITY
Start: 2022-12-09 | End: 2022-12-24

## 2023-01-09 RX ORDER — GABAPENTIN 100 MG/1
1 CAPSULE ORAL 3 TIMES DAILY
COMMUNITY
Start: 2022-12-09 | End: 2022-12-24

## 2023-01-09 NOTE — PROGRESS NOTES
.Complex Case Management      Date/Time:  2023 1:05 PM    Method of communication with patient:phone    [address] [facility] [place] [facility] [place] ([facility]) contacted the patient by telephone to perform Ambulatory Care Coordination. Verified name and  (PHI) with patient as identifiers. Provided introduction to self, and explanation of the Ambulatory Care Manager's role. Reviewed most recent clinic visit w/ patient who verbalized understanding. Patient given an opportunity to ask questions. Patient voicing Gabapentin really helps his back but he was told is it a control medication . [facility] verified that He would need to sign a pain contract. Patient asking for an appointment. [facility] contacted office and appointment is for [email or web address] to be there by 1:00. Patient verbalizing understanding. ACM encourage patient to make his f/u appointments prior to leaving the office. Top Challenges reviewed with the Provider   Patient has f/u medication assessment for Gabapentin eval     The patient agrees to contact the PCP office or the Ambulatory Care Manager for questions related to their healthcare. Provided contact information for future reference. Disease Specific:   N/A    Home Health Active: No    DME Active: No    Barriers to care? Oj    Advance Care Planning:   Does patient have an Advance Directive:  not on file; education provided     Medication(s):   Medication reconciliation was performed with patient, who verbalizes understanding of administration of home medications. There were no barriers to obtaining medications identified at this time. Referral to Pharm D needed: no     Current Outpatient Medications   Medication Sig    Arthritis Pain Relief 650 mg TbER TAKE 2 TABLETS TWICE DAILY AS NEEDED FOR PAIN UP TO 30 DAYS    levETIRAcetam (Keppra) 500 mg tablet Take 1 Tablet by mouth two (2) times a day.     tiZANidine (ZANAFLEX) 4 mg tablet take 1 tablet by mouth every 12 hours if needed FOR muscle spasm    lisinopriL (PRINIVIL, ZESTRIL) 30 mg tablet TAKE 1 TABLET EVERY DAY    lidocaine (LIDODERM) 5 % 1 Patch by TransDERmal route every twelve (12) hours. hydroCHLOROthiazide (HYDRODIURIL) 12.5 mg tablet TAKE 1 TABLET EVERY DAY    rosuvastatin (CRESTOR) 20 mg tablet TAKE 1 TABLET AT BEDTIME    chlorhexidine (PERIDEX) 0.12 % solution     fluticasone propionate (FLONASE) 50 mcg/actuation nasal spray 1 Spray daily. loratadine (CLARITIN) 10 mg tablet Take 10 mg by mouth daily. metFORMIN ER (GLUCOPHAGE XR) 750 mg tablet Take 750 mg by mouth two (2) times a day. Accu-Chek Fastclix Lancet Drum misc TEST BLOOD SUGAR EVERY DAY    amitriptyline (ELAVIL) 25 mg tablet TAKE 1 TABLET EVERY NIGHT    alcohol swabs (BD Single Use Swabs Regular) padm USE EVERY DAY    glucose blood VI test strips (Accu-Chek Colette Plus test strp) strip Test BS daily and PRN   Dx code E11.65    Blood Glucose Control High&Low (ACCU-CHEK COLETTE CONTROL SOLN) soln Use as directed    Blood-Glucose Meter (ACCU-CHEK COLETTE CONNECT METER) misc E11.65 - BG testing daily and PRN    clotrimazole-betamethasone (LOTRISONE) topical cream apply to affected area twice a day    ondansetron (ZOFRAN ODT) 4 mg disintegrating tablet Take 1 Tab by mouth every eight (8) hours as needed for Nausea. No current facility-administered medications for this visit. [facility] follow up appointment(s):   Future Appointments   Date Time Provider Rosalind Careyisti   1/25/2023  1:15 PM Ivan Hardy MD [facility] AMB   1/26/2023 11:00 AM Ankit Zimmerman MD [facility] AMB        Non-[facility] follow up appointment(s): No     Goals Addressed                   This Visit's Progress       Diabetes     Reduce risk of comorbid complications related to diabetes (renal disease, heart disease, amputation, and retinopathy).    On track     Patient will take medications Metformin 750 twice daily  Patient will attend Endocrinology appointments  Patient will maintain ADA ,carb no concentrated sweets diet  Patient will strive to get HGA1c down < 7.0 ( Current 8.7)  1/9/23  On going         Skills necessary to properly manage their diabetes, including use of devices and symptom monitoring tools. On track     Patient will check FSB as directed Has f/u with Endocrinology 1/9/23 Dr. Janna Read  1/9/23  A1c down to 8.4 (10/22)         General     Attends follow up appointments on schedule   On track     Patient will attend all providers f/u and scheduled appointments  PCP 12/1/22  Cardiology  Endocrinology 7/23/23  Ortho 1/23/23       Takes all medications as ordered   On track     Patient will take all medications as prescribed  1/9/23  Taking medications as prescribed using the My Chart for refills as needed. Post ED     Coordinate Pain Management Plan for Patient. On track     Patient will attend PT as scheduled 12/6/22 S/P Low back pain with Sciatica  Take Tylenol extra strength as directed  Use Lidoderm 5% patches on 12 hours off 12 hours  Medrol Yasir as prescribed. 1/9/23  Tolerating Arthritis 650 mg Tylenol for pain refill 1/8/23       Establish PCP relationships and regularly scheduled appointments.    On track     PAtient will contact PCP for direction to go to ED or Urgent care  1/9/23  Has kept PCP appointment requesting a f/u       COMPLETED: Reduce ED Utilization   On track     Patient will follow up with post ED protocol:  PT, Pain management measures  ACM will monitor x 30 days  1/9/23  No ED visit in the last 30 days

## 2023-01-16 DIAGNOSIS — R56.9 SEIZURE-LIKE ACTIVITY (HCC): ICD-10-CM

## 2023-01-16 RX ORDER — LEVETIRACETAM 500 MG/1
TABLET ORAL
Qty: 60 TABLET | Refills: 0 | Status: SHIPPED | OUTPATIENT
Start: 2023-01-16

## 2023-01-25 ENCOUNTER — APPOINTMENT (OUTPATIENT)
Dept: FAMILY MEDICINE CLINIC | Age: 58
End: 2023-01-25

## 2023-01-25 ENCOUNTER — TELEPHONE (OUTPATIENT)
Dept: FAMILY MEDICINE CLINIC | Age: 58
End: 2023-01-25

## 2023-01-25 ENCOUNTER — OFFICE VISIT (OUTPATIENT)
Dept: FAMILY MEDICINE CLINIC | Age: 58
End: 2023-01-25
Payer: MEDICARE

## 2023-01-25 VITALS
SYSTOLIC BLOOD PRESSURE: 130 MMHG | RESPIRATION RATE: 14 BRPM | HEART RATE: 70 BPM | DIASTOLIC BLOOD PRESSURE: 86 MMHG | WEIGHT: 217 LBS | TEMPERATURE: 97.9 F | OXYGEN SATURATION: 98 % | HEIGHT: 73 IN | BODY MASS INDEX: 28.76 KG/M2

## 2023-01-25 DIAGNOSIS — E11.65 TYPE 2 DIABETES MELLITUS WITH HYPERGLYCEMIA, WITHOUT LONG-TERM CURRENT USE OF INSULIN (HCC): ICD-10-CM

## 2023-01-25 DIAGNOSIS — Z12.5 PROSTATE CANCER SCREENING: ICD-10-CM

## 2023-01-25 DIAGNOSIS — G89.29 CHRONIC BILATERAL LOW BACK PAIN WITHOUT SCIATICA: Primary | ICD-10-CM

## 2023-01-25 DIAGNOSIS — G89.29 CHRONIC BILATERAL LOW BACK PAIN WITHOUT SCIATICA: ICD-10-CM

## 2023-01-25 DIAGNOSIS — M54.50 CHRONIC BILATERAL LOW BACK PAIN WITHOUT SCIATICA: ICD-10-CM

## 2023-01-25 DIAGNOSIS — R56.9 SEIZURE-LIKE ACTIVITY (HCC): ICD-10-CM

## 2023-01-25 DIAGNOSIS — M54.50 CHRONIC BILATERAL LOW BACK PAIN WITHOUT SCIATICA: Primary | ICD-10-CM

## 2023-01-25 PROCEDURE — 99214 OFFICE O/P EST MOD 30 MIN: CPT | Performed by: LEGAL MEDICINE

## 2023-01-25 PROCEDURE — G8427 DOCREV CUR MEDS BY ELIG CLIN: HCPCS | Performed by: LEGAL MEDICINE

## 2023-01-25 PROCEDURE — G9717 DOC PT DX DEP/BP F/U NT REQ: HCPCS | Performed by: LEGAL MEDICINE

## 2023-01-25 PROCEDURE — 3079F DIAST BP 80-89 MM HG: CPT | Performed by: LEGAL MEDICINE

## 2023-01-25 PROCEDURE — G8419 CALC BMI OUT NRM PARAM NOF/U: HCPCS | Performed by: LEGAL MEDICINE

## 2023-01-25 PROCEDURE — 3075F SYST BP GE 130 - 139MM HG: CPT | Performed by: LEGAL MEDICINE

## 2023-01-25 PROCEDURE — 3046F HEMOGLOBIN A1C LEVEL >9.0%: CPT | Performed by: LEGAL MEDICINE

## 2023-01-25 PROCEDURE — 3017F COLORECTAL CA SCREEN DOC REV: CPT | Performed by: LEGAL MEDICINE

## 2023-01-25 PROCEDURE — 2022F DILAT RTA XM EVC RTNOPTHY: CPT | Performed by: LEGAL MEDICINE

## 2023-01-25 RX ORDER — GABAPENTIN 100 MG/1
100 CAPSULE ORAL 3 TIMES DAILY
Qty: 90 CAPSULE | Refills: 2 | Status: SHIPPED | OUTPATIENT
Start: 2023-01-25 | End: 2023-01-29

## 2023-01-25 RX ORDER — GABAPENTIN 100 MG/1
CAPSULE ORAL 3 TIMES DAILY
COMMUNITY
End: 2023-01-25 | Stop reason: SDUPTHER

## 2023-01-25 NOTE — PROGRESS NOTES
Chata Meza is a 59 y.o. male (: 1965) presenting to address:    Chief Complaint   Patient presents with    Medication Evaluation       Vitals:    23 1317   BP: 130/86   Pulse: 70   Resp: 14   Temp: 97.9 °F (36.6 °C)   TempSrc: Temporal   SpO2: 98%   Weight: 217 lb (98.4 kg)   Height: 6' 1\" (1.854 m)   PainSc:   0 - No pain       Hearing/Vision:   No results found. Learning Assessment:     Learning Assessment 2015   PRIMARY LEARNER Patient   HIGHEST LEVEL OF EDUCATION - PRIMARY LEARNER  GRADUATED HIGH SCHOOL OR GED   BARRIERS PRIMARY LEARNER NONE   CO-LEARNER CAREGIVER No   PRIMARY LANGUAGE ENGLISH   LEARNER PREFERENCE PRIMARY OTHER (COMMENT)   ANSWERED BY patient   RELATIONSHIP SELF     Depression Screening:     3 most recent PHQ Screens 2023   Little interest or pleasure in doing things Not at all   Feeling down, depressed, irritable, or hopeless Not at all   Total Score PHQ 2 0     Fall Risk Assessment:     Fall Risk Assessment, last 12 mths 2022   Able to walk? Yes   Fall in past 12 months? 0   Do you feel unsteady? 0   Are you worried about falling 0   Is TUG test greater than 12 seconds? -   Is the gait abnormal? -   Number of falls in past 12 months -   Fall with injury? -     Abuse Screening:     Abuse Screening Questionnaire 2022   Do you ever feel afraid of your partner? N   Are you in a relationship with someone who physically or mentally threatens you? N   Is it safe for you to go home?  Y     ADL Assessment:     ADL Assessment 2016   Feeding yourself No Help Needed   Getting from bed to chair No Help Needed   Getting dressed Help Needed   Bathing or showering No Help Needed   Walk across the room (includes cane/walker) No Help Needed   Using the telphone No Help Needed   Taking your medications No Help Needed   Preparing meals No Help Needed   Managing money (expenses/bills) No Help Needed   Moderately strenuous housework (laundry) Help Needed   Shopping for personal items (toiletries/medicines) Help Needed   Shopping for groceries Help Needed   Driving Help Needed   Climbing a flight of stairs No Help Needed   Getting to places beyond walking distances No Help Needed        Coordination of Care Questionaire:   1. \"Have you been to the ER, urgent care clinic since your last visit? Hospitalized since your last visit? \" No    2. \"Have you seen or consulted any other health care providers outside of the [address] [place] [address] [place] since your last visit? \" No     3. For patients aged 39-70: Has the patient had a colonoscopy? No     If the patient is female:    4. For patients aged 41-77: Has the patient had a mammogram within the past 2 years? No    5. For patients aged 21-65: Has the patient had a pap smear? No    Advanced Directive:   1. Do you have an Advanced Directive? NO    2. Would you like information on Advanced Directives?  NO

## 2023-01-25 NOTE — PROGRESS NOTES
Acosta Michael     Chief Complaint   Patient presents with    Medication Evaluation     Vitals:    01/25/23 1317   BP: 130/86   Pulse: 70   Resp: 14   Temp: 97.9 °F (36.6 °C)   TempSrc: Temporal   SpO2: 98%   Weight: 217 lb (98.4 kg)   Height: 6' 1\" (1.854 m)   PainSc:   0 - No pain         HPI:Lo Traore is here accompanied by his wife for follow up and medication refill he has sue taking gabapentin and gabapentin for his back pain that has been helping a lot with the pain    He is following up with neurologist and has been taking Keppra and since he started [employer] he has not had any episodes of loss of consciousness or dizziness or seizure-like activities    Past Medical History:   Diagnosis Date    Allergic rhinitis due to pollen     Diverticulosis Jan 2016    Colonoscopy - Dr Kayli Angel hypertension     Helicobacter pylori gastritis 7/22/2015    Clarithromycin, Amoxil, and Omeprazole BID x 14 days prescribed    Hemorrhoid Jan 2016    Colonoscopy - Dr May Vila    Herniated disc     per pt multiple levels    History of lumbar fusion 1/8/2016    Influenza vaccination declined by patient - 12/26/17 12/26/2017    Noncompliance with treatment plan     Pancreatitis     PUD (peptic ulcer disease) Jan 2016    No H Pylori on biopsy, multiple small ulcers on EGD (Dr. Benjie Doty)    Scleral icterus 7/9/2015     Past Surgical History:   Procedure Laterality Date    HX COLONOSCOPY N/A Jan 2016    Dr. Benjie Doty - hemorrhoids and diverticulosis, 10 year interval    HX ENDOSCOPY N/A Jan 2016    Dr. Benjie Doty - multiple small ulcers, neg H pylori    HX LUMBAR FUSION       Social History     Tobacco Use    Smoking status: Some Days     Types: Cigars    Smokeless tobacco: Never    Tobacco comments:     Pt smokes Black & Milds sometimes   Substance Use Topics    Alcohol use: Not Currently     Alcohol/week: 2.5 standard drinks     Types: 3 Standard drinks or equivalent per week     Comment: 5/2019 stopped drinking a few months ago and lost 30#s       Family History   Problem Relation Age of Onset    Diabetes Mother     Hypertension Father     Stroke Sister     Diabetes Sister        Review of Systems   Constitutional:  Negative for chills, fever, malaise/fatigue and weight loss. HENT:  Negative for congestion, ear discharge, ear pain, hearing loss, nosebleeds, sinus pain and sore throat. Eyes:  Negative for blurred vision, double vision and discharge. Respiratory:  Negative for cough, hemoptysis, sputum production, shortness of breath, wheezing and stridor. Cardiovascular:  Negative for chest pain, palpitations, claudication and leg swelling. Gastrointestinal:  Negative for abdominal pain, constipation, diarrhea, nausea and vomiting. Genitourinary:  Negative for dysuria, frequency and urgency. Musculoskeletal:  Positive for back pain and myalgias. Negative for falls, joint pain and neck pain. Skin:  Negative for itching and rash. Neurological:  Positive for tingling. Negative for dizziness, sensory change, speech change, focal weakness, weakness and headaches. Psychiatric/Behavioral:  Negative for depression, hallucinations, substance abuse and suicidal ideas. The patient is not nervous/anxious. Physical Exam  Constitutional:       General: He is not in acute distress. Appearance: Normal appearance. He is well-developed. He is not ill-appearing, toxic-appearing or diaphoretic. HENT:      Head: Normocephalic and atraumatic. Eyes:      General: No scleral icterus. Right eye: No discharge. Left eye: No discharge. Conjunctiva/sclera: Conjunctivae normal.      Pupils: Pupils are equal, round, and reactive to light. Neck:      Thyroid: No thyromegaly. Cardiovascular:      Rate and Rhythm: Normal rate and regular rhythm. Heart sounds: Normal heart sounds. Pulmonary:      Effort: Pulmonary effort is normal. No respiratory distress. Breath sounds: Normal breath sounds.  No rales.   Abdominal:      General: Bowel sounds are normal. There is no distension. Palpations: Abdomen is soft. There is no mass. Tenderness: There is no abdominal tenderness. There is no rebound. Musculoskeletal:         General: No tenderness, deformity or signs of injury. Normal range of motion. Cervical back: Normal range of motion. No rigidity or tenderness. Right lower leg: No edema. Left lower leg: No edema. Lymphadenopathy:      Cervical: No cervical adenopathy. Skin:     General: Skin is warm and dry. Coloration: Skin is not jaundiced. Findings: No bruising, erythema, lesion or rash. Neurological:      Mental Status: He is alert and oriented to person, place, and time. Cranial Nerves: No cranial nerve deficit. Motor: No weakness. Coordination: Coordination normal.      Gait: Gait normal.   Psychiatric:         Mood and Affect: Mood normal.         Behavior: Behavior normal.         Thought Content: Thought content normal.         Judgment: Judgment normal.        Assessment and plan     Plan of care has been discussed with the patient, he agrees to the plan and verbalized understanding. All his questions were answered  More than 50% of the time spent in this visit was counseling the patient about  illness and treatment options         1. Type 2 diabetes mellitus with hyperglycemia, without long-term current use of insulin ([address]  He is following up with endocrinologist last hemoglobin A1c is 8.5 that is improvement from 14    2. Seizure-like activity ([address]  Stable on current medication and follow-up with neurologist    3. Chronic bilateral low back pain without sciatica  Patient has upcoming appointment with orthopedic surgery  - 11-DRUG SCREEN, URINE; Future  Refill gabapentin has been given to the patient he signed controlled substance agreement and gave a urine sample  Patient stating that he occasionally smokes marijuana  4.  Prostate cancer screening    - PSA SCREENING (SCREENING); Future    Current Outpatient Medications   Medication Sig Dispense Refill    gabapentin (NEURONTIN) 100 mg capsule Take  by mouth three (3) times daily. levETIRAcetam (KEPPRA) 500 mg tablet take 1 tablet by mouth twice a day 60 Tablet 0    Arthritis Pain Relief 650 mg TbER TAKE 2 TABLETS TWICE DAILY AS NEEDED FOR PAIN UP TO 30 DAYS 100 Tablet 0    tiZANidine (ZANAFLEX) 4 mg tablet take 1 tablet by mouth every 12 hours if needed FOR muscle spasm 60 Tablet 0    lisinopriL (PRINIVIL, ZESTRIL) 30 mg tablet TAKE 1 TABLET EVERY DAY 90 Tablet 1    lidocaine (LIDODERM) 5 % 1 Patch by TransDERmal route every twelve (12) hours. hydroCHLOROthiazide (HYDRODIURIL) 12.5 mg tablet TAKE 1 TABLET EVERY DAY 90 Tablet 1    rosuvastatin (CRESTOR) 20 mg tablet TAKE 1 TABLET AT BEDTIME 90 Tablet 3    chlorhexidine (PERIDEX) 0.12 % solution       fluticasone propionate (FLONASE) 50 mcg/actuation nasal spray 1 Spray daily. loratadine (CLARITIN) 10 mg tablet Take 10 mg by mouth daily. Accu-Chek Fastclix Lancet Drum misc TEST BLOOD SUGAR EVERY  Each 5    amitriptyline (ELAVIL) 25 mg tablet TAKE 1 TABLET EVERY NIGHT 90 Tablet 3    alcohol swabs (BD Single Use Swabs Regular) padm USE EVERY  Pad 11    glucose blood VI test strips (Accu-Chek Colette Plus test strp) strip Test BS daily and PRN   Dx code E11.65 100 Strip 11    Blood Glucose Control High&Low (ACCU-CHEK COLETTE CONTROL SOLN) soln Use as directed 1 Bottle 0    Blood-Glucose Meter (ACCU-CHEK COLETTE CONNECT METER) [facility] [place] - BG testing daily and PRN 1 Each 0    clotrimazole-betamethasone (LOTRISONE) topical cream apply to affected area twice a day 30 g 1    ondansetron (ZOFRAN ODT) 4 mg disintegrating tablet Take 1 Tab by mouth every eight (8) hours as needed for Nausea.  40 Tab 1       Patient Active Problem List    Diagnosis Date Noted    Type 2 diabetes mellitus with hyperglycemia, without long-term current use of insulin ([facility] 01/25/2023    Seizure-like activity ([facility] 01/25/2023    Malaise 09/13/2022    Depression 02/20/2022    Acquired hypothyroidism 02/20/2022    Syncope and collapse 02/20/2022    Benign essential HTN 02/20/2022    HLD (hyperlipidemia) 02/20/2022    DM2 (diabetes mellitus, type 2) ([facility] 02/20/2022    Diastasis recti 12/21/2021    History of peptic ulcer 01/23/2020    Postconcussion syndrome 01/23/2020    Hypertensive left ventricular hypertrophy, without heart failure 01/23/2020    Type 2 diabetes mellitus without complication, without long-term current use of insulin ([facility] [address] [facility] 01/23/2020    Influenza vaccination declined by patient 12/06/2018    Headache disorder 12/25/2017    Mixed hyperlipidemia 06/08/2013    Essential hypertension     Chronic bilateral low back pain without sciatica     Lightheadedness 09/20/2011     Results for orders placed or performed in visit on 12/01/22   CULTURE, URINE   Result Value Ref Range    Urine Culture, Routine Proteus mirabilis  Cefazolin <=4 ug/mL   (A)        Susceptibility    Proteus mirabilis -  (no method available)*     Amoxicillin/Clavulanic A  Susceptible ug/mL     Ampicillin ($)  Susceptible ug/mL     Cefepime ($$)  Susceptible ug/mL     Ceftriaxone ($)  Susceptible ug/mL     Cefuroxime ($)  Susceptible ug/mL     Ciprofloxacin ($)  Susceptible ug/mL     Ertapenem ($$$$)  Susceptible ug/mL     Gentamicin ($)  Susceptible ug/mL     Levofloxacin ($)  Susceptible ug/mL     Meropenem ($$)  Susceptible ug/mL     Nitrofurantoin  Resistant ug/mL     Piperacillin/Tazobac ($)  Susceptible ug/mL     Tetracycline  Resistant ug/mL     Tobramycin ($)  Susceptible ug/mL     Trimeth-Sulfamethoxa  Susceptible ug/mL     * Performed at:  [address] [place] [address]  [id number] Director: Agusto Giles MD, Phone:  [phone]   AMB POC URINALYSIS DIP STICK AUTO W/O MICRO   Result Value Ref Range    Color (UA POC) Dark Yellow     Clarity (UA POC) Cloudy Glucose (UA POC) Negative Negative    Bilirubin (UA POC) 1+ Negative    Ketones (UA POC) Trace Negative    Specific gravity (UA POC) 1.030 1.001 - 1.035    Blood (UA POC) 1+ Negative    pH (UA POC) 5.5 4.6 - 8.0    Protein (UA POC) 1+ Negative    Urobilinogen (UA POC) 0.2 mg/dL 0.2 - 1    Nitrites (UA POC) Negative Negative    Leukocyte esterase (UA POC) Negative Negative     Office Visit on 12/01/2022   Component Date Value Ref Range Status    Color (UA POC) 12/01/2022 Dark Yellow   Final    Clarity (UA POC) 12/01/2022 Cloudy   Final    Glucose (UA POC) 12/01/2022 Negative  Negative Final    Bilirubin (UA POC) 12/01/2022 1+  Negative Final    Ketones (UA POC) 12/01/2022 Trace  Negative Final    Specific gravity (UA POC) 12/01/2022 1.030  1.001 - 1.035 Final    Blood (UA POC) 12/01/2022 1+  Negative Final    pH (UA POC) 12/01/2022 5.5  4.6 - 8.0 Final    Protein (UA POC) 12/01/2022 1+  Negative Final    Urobilinogen (UA POC) 12/01/2022 0.2 mg/dL  0.2 - 1 Final    Nitrites (UA POC) 12/01/2022 Negative  Negative Final    Leukocyte esterase (UA POC) 12/01/2022 Negative  Negative Final    Urine Culture, Routine 12/01/2022  (A)   Final                    Value:Proteus mirabilis  Cefazolin <=4 ug/mL      Comment: Cefazolin with an AYLIN <=16 predicts susceptibility to the oral agents  cefaclor, cefdinir, cefpodoxime, cefprozil, cefuroxime, cephalexin,  and loracarbef when used for therapy of uncomplicated urinary tract  infections due to E. coli, Klebsiella pneumoniae, and Proteus  mirabilis. Multi-Drug Resistant Organism  25,000-50,000 colony forming units per mL            Follow-up and Dispositions    Return in about 3 months (around 4/25/2023) for for medicare wellness.

## 2023-01-25 NOTE — LETTER
CONTROLLED SUBSTANCE MEDICATION AGREEMENT  Patient Name: Freddie Alford  Patient YOB: 1965     I understand, that controlled substance medications may be used to help better manage my symptoms and to improve my ability to function at home, work and in social settings. However, I also understand that these medications do have risks, which have been discussed with me, including possible development of physical or psychological dependence. I understand that successful treatment requires mutual trust and honesty between me and my provider. I understand and agree that following this Medication Agreement is necessary in continuing my provider-patient relationship and the success of my treatment plan. Explanation from my Provider: Benefits and Goals of Controlled Substance Medications: There are two potential goals for your treatment: (1) decreased pain and suffering (2) improved daily life functions. There are many possible treatments for your chronic condition(s). Alternatives such as physical therapy, yoga, massage, home daily exercise, meditation, relaxation techniques, injections, chiropractic manipulations, surgery, cognitive therapy, hypnosis and many medications that are not habit-forming may be used. Use of controlled substance medications may be helpful, but they are unlikely to resolve all symptoms or restore all function. Explanation from my Provider: Risks of Controlled Substance Medications:   Opioid pain medications: These medications can lead to problems such as addiction/dependence, sedation, lightheadedness/dizziness, memory issues, falls, constipation, nausea, or vomiting. They may also impair the ability to drive or operate machinery. Additionally, these medications may lower testosterone levels, leading to loss of bone strength, stamina and sex drive.   They may cause problems with breathing, sleep apnea and reduced coughing, which is especially dangerous for patients with lung disease. Overdose or dangerous interactions with alcohol and other medications may occur, leading to death. Hyperalgesia may develop, which means patients receiving opioids for the treatment of pain may become more sensitive to certain painful stimuli, and in some cases, experience pain from ordinarily non-painful stimuli. Women between the ages of 14-53 who could become pregnant should carefully weigh the risks and benefits of opioids with their physicians, as these medications increase the risk of pregnancy complications, including miscarriage,  delivery and stillbirth. It is also possible for babies to be born addicted to opioids. Opioid dependence withdrawal symptoms may include; feelings of uneasiness, increased pain, irritability, belly pain, diarrhea, sweats and goose-flesh. Testosterone replacement therapy:  Potential side effects include increased risk of stroke and heart attack, blood clots, increased blood pressure, increased cholesterol, enlarged prostate, sleep apnea, irritability/aggression and other mood disorders, and decreased fertility. Denise Hicks (1965)             Page 1 of 4    Initials:_______    Benzodiazepines and non-benzodiazepine sleep medications: These medications can lead to problems such as addiction/dependence, sedation, fatigue, lightheadedness, dizziness, incoordination, falls, depression, hallucinations, and impaired judgment, memory and concentration. The ability to drive and operate machinery may also be affected. Abnormal sleep-related behaviors have been reported, including sleepwalking, driving, making telephone calls, eating, or having sex while not fully awake. These medications can suppress breathing and worsen sleep apnea, particularly when combined with alcohol or other sedating medications, potentially leading to death. Dependence withdrawal symptoms may include tremors, anxiety, hallucinations and seizures.    Stimulants:  Common adverse effects include addiction/dependence, increased blood pressure and heart rate, decreased appetite, nausea, involuntary weight loss, insomnia,  irritability, and headaches. These risks may increase when these medications are combined with other stimulants, such as caffeine pills or energy drinks, certain weight loss supplements and oral decongestants. Dependence withdrawal symptoms may include depressed mood, loss of interest, suicidal thoughts, anxiety, fatigue, appetite changes and agitation. I agree and understand that I and my prescriber have the following rights and responsibilities regarding my treatment plan:   1. MY RIGHTS:  To be informed of my treatment and medication plan. To be an active participant in my health and wellbeing. 2. MY RESPONSIBILITY AND UNDERSTANDING FOR USE OF MEDICATIONS   I will take medications at the dose and frequency as directed. For my safety, I will not increase or change how I take my medications without the recommendation of my healthcare provider.  I will actively participate in any program recommended by my provider which may improve function, including social, physical, psychological programs.  I will not take my medications with alcohol or other drugs not prescribed to me. I understand that drinking alcohol with my medications increases the chances of side effects, including reduced breathing rate and could lead to personal injury when operating machinery.  I understand that if I have a history of substance use disorders, including alcohol or other illicit drugs, that I may be at increased risk of addiction to my medications.  I agree to notify my provider immediately if I should become pregnant so that my treatment plan can be adjusted.    I agree and understand that I shall only receive controlled substance medications from the prescriber that signed this agreement unless there is written agreement among other prescribers of controlled substances outlining the responsibility of the medications being prescribed.  I understand that the if the controlled medication is not helping to achieve goals, the dosage may be tapered and no longer prescribed. 3. MY RESPONSIBILITY FOR COMMUNICATION / PRESCRIPTION RENEWALS   I agree that all controlled substance medications that I take will be prescribed only by my provider. If another healthcare provider prescribes me medication in an emergency, I will notify my provider within seventy-two (72) hours. Art Tamayo (1965)             Page 2 of 4    Initials:_______  Kate Oneal I will arrange for refills at the prescribed interval ONLY during regular office hours. I will not ask for refills earlier than agreed, after-hours, on holidays or weekends. Refills may take up to 72 hours for processing and prescriptions to reach the pharmacy.  I will inform my other health care providers that I am taking these medications and of the existence of this [id number]. In the event of an emergency, I will provide the same information to the emergency department prescribers.  I will keep my provider updated on the pharmacy I am using for controlled medication prescription filling. 4. MY RESPONSIBILITY FOR PROTECTING MEDICATIONS   I will protect my prescriptions and medications. I understand that lost or misplaced prescriptions will not be replaced.  I will keep medications only for my own use and will not share them with others. I will keep all medications away from children.  I agree that if my medications are adjusted or discontinued, I will properly dispose of any remaining medications. I understand that I will be required to dispose of any remaining controlled medications as, directed by my prescriber, prior to being provided with any prescriptions for other controlled medications.   Medication drop box locations can be found at: [email or web address]  5. MY RESPONSIBILITY WITH ILLEGAL DRUGS    I will not use illegal or street drugs or another person's prescription medications not prescribed to me.  If there are identified addiction type symptoms, then referral to a program may be provided by my provider and I agree to follow through with this recommendation. 6. MY RESPONSIBILITY FOR COOPERATION WITH INVESTIGATIONS   I understand that my provider will comply with any applicable law and may discuss my use and/or possible misuse/abuse of controlled substances and alcohol, as appropriate, with any health care provider involved in my care, pharmacist, or legal authority.  I authorize my provider and pharmacy to cooperate fully with law enforcement agencies (as permitted by law) in the investigation of any possible misuse, sale, or other diversion of my controlled substances.  I agree to waive any applicable privilege or right of privacy or confidentiality with respect to these authorizations. 7. PROVIDERS RIGHT TO MONITOR FOR SAFETY: PRESCRIPTION MONITORING / DRUG TESTING   I consent to drug/toxicology screening and will submit to a drug screen upon my providers request to assure I am only taking the prescribed drugs for my safety monitoring. I understand that a drug screen is a laboratory test in which a sample of my urine, blood or saliva is checked to see what drugs I have been taking. This may entail an observed urine specimen, which means that a nurse or other health care provider may watch me provide urine, and I will cooperate if I am asked to provide an observed specimen. Nile Reynoso (1965)             Page 3 of 4    Initials:_______  Hayley Singh I understand that my provider will check a copy of my State Prescription Monitoring Program () Report in order to safely prescribe medications.      Pill Counts: I consent to pill counts when requested. I may be asked to bring all my prescribed controlled substance medications, in their original bottles, to all of my scheduled appointments. In addition, my provider may ask me to come to the practice at any time for a random pill count. 8. TERMINATION OF THIS AGREEMENT   For my safety, my prescriber has the right to stop prescribing controlled substance medications and may end this agreement.  Conditions that may result in termination of this agreement:  a. I do not show any improvement in pain, or my activity has not improved. b. I develop rapid tolerance or loss of improvement, as described in my treatment plan.  c. I develop significant side effects from the medication. d. My behavior is not consistent with the responsibilities outlined above, thereby causing safety concerns to continue prescribing controlled substance medications. e. I fail to follow the terms of this agreement. f. Other:____________________________     UNDERSTANDING THIS MEDICATION AGREEMENT:    I have read the above and have had all my questions answered. For chronic disease management, I know that my symptoms can be managed with many types of treatments. A chronic medication trial may be part of my treatment, but I must be an active participant in my care. Medication therapy is only one part of my symptom management plan. In some cases, there may be limited scientific evidence to support the chronic use of certain medications to improve symptoms and daily function. Furthermore, in certain circumstances, there may be scientific information that suggests that the use of chronic controlled substances may worsen my symptoms and increase my risk of unintentional death directly related to this medication therapy. I know that if my provider feels my risk from controlled medications is greater than my benefit, I will have my controlled substance medication(s) compassionately lowered or removed altogether. I further agree to allow this office to contact my HIPAA contact if there are concerns about my safety and use of the controlled medications. I have agreed to use the prescribed controlled substance medications to me as instructed by my provider and as stated in this Medication Agreement. My initial on each page and my signature below shows that I have read each page and I have had the opportunity to ask questions with answers provided by my provider.       Patient Name (Printed): _____________________________________    Patient Signature:  ______________________   Date: _____________      Prescriber Name (Printed): ___________________________________    Prescriber Signature: _____________________  Date: _____________     Art Me (1965)             Page 4 of 4

## 2023-01-26 LAB
AMPHETAMINES UR QL SCN: NEGATIVE NG/ML
BARBITURATES UR QL SCN: NEGATIVE NG/ML
BENZODIAZ UR QL SCN: NEGATIVE NG/ML
BUPRENORPHINE UR QL: NEGATIVE NG/ML
BZE UR QL SCN: POSITIVE NG/ML
CANNABINOIDS UR QL SCN: POSITIVE NG/ML
CREAT UR-MCNC: 127.2 MG/DL (ref 20–300)
METHADONE UR QL SCN: NEGATIVE NG/ML
OPIATES UR QL SCN: NEGATIVE NG/ML
OXYCODONE+OXYMORPHONE UR QL SCN: NEGATIVE NG/ML
PCP UR QL: NEGATIVE NG/ML
PH UR: 5 [PH] (ref 4.5–8.9)
PLEASE NOTE:, 733163: ABNORMAL
PROPOXYPH UR QL SCN: NEGATIVE NG/ML
PSA SERPL-MCNC: 1.6 NG/ML (ref 0–4)

## 2023-01-26 NOTE — TELEPHONE ENCOUNTER
Lori w/ Dr. Megan Gates [facility] office and patient is not due for colonoscopy until 2026. Their office will fax records.

## 2023-01-29 DIAGNOSIS — E11.65 UNCONTROLLED TYPE 2 DIABETES MELLITUS WITH HYPERGLYCEMIA (HCC): ICD-10-CM

## 2023-01-30 RX ORDER — ISOPROPYL ALCOHOL 70 ML/100ML
SWAB TOPICAL
Qty: 200 PAD | Refills: 5 | Status: SHIPPED | OUTPATIENT
Start: 2023-01-30

## 2023-01-30 NOTE — PROGRESS NOTES
Normal prostate cancer screening urine is positive for marijuana that  he told me about but also positive for cocaine !  He will  not be getting any gabapentin prescription

## 2023-01-31 ENCOUNTER — PATIENT OUTREACH (OUTPATIENT)
Dept: CASE MANAGEMENT | Age: 58
End: 2023-01-31

## 2023-01-31 NOTE — PROGRESS NOTES
.Complex Case Management      Date/Time:  1/31/2023 3:11 PM    Method of communication with patient:phone    [id number] ([facility]) contacted the patient by telephone to perform Ambulatory Care Coordination follow up  Left message for return call. BS follow up appointment(s): No future appointments. Goals Addressed                   This Visit's Progress       General     Takes all medications as ordered   On track     Patient will take all medications as prescribed  1/9/23  Taking medications as prescribed using the My Chart for refills as needed.   1/31/23  Patient taking Keppra 500 mg twice daily new medication>> neurology  Gabapentin was stopped 1/25/23

## 2023-02-13 DIAGNOSIS — G89.29 CHRONIC BILATERAL LOW BACK PAIN, UNSPECIFIED WHETHER SCIATICA PRESENT: Primary | ICD-10-CM

## 2023-02-13 DIAGNOSIS — M54.50 CHRONIC BILATERAL LOW BACK PAIN, UNSPECIFIED WHETHER SCIATICA PRESENT: Primary | ICD-10-CM

## 2023-02-14 RX ORDER — TIZANIDINE 4 MG/1
TABLET ORAL
Qty: 60 TABLET | Refills: 0 | Status: SHIPPED | OUTPATIENT
Start: 2023-02-14

## 2023-02-21 RX ORDER — LISINOPRIL 30 MG/1
TABLET ORAL
Qty: 90 TABLET | Refills: 1 | Status: SHIPPED | OUTPATIENT
Start: 2023-02-21

## 2023-02-28 NOTE — TELEPHONE ENCOUNTER
Ivone Angeles called for their medication refill. Last Office visit:  1/25/2023    Last Filled: 1/16/2023; Qty 60 w/ 0 refills    Follow up visit:  No future appointments.

## 2023-03-01 RX ORDER — LEVETIRACETAM 500 MG/1
TABLET ORAL
Qty: 60 TABLET | Refills: 1 | Status: SHIPPED | OUTPATIENT
Start: 2023-03-01

## 2023-03-07 NOTE — TELEPHONE ENCOUNTER
Luis M Lisa called for their medication refill. Last Office visit:  1/25/2023    Last Filled: 1/8/2023; Qty 100 w/ 0 refills     Follow up visit:  No future appointments.

## 2023-03-08 RX ORDER — LORATADINE 10 MG/1
TABLET ORAL
Qty: 100 TABLET | Refills: 3 | Status: SHIPPED | OUTPATIENT
Start: 2023-03-08

## 2023-03-09 RX ORDER — BLOOD SUGAR DIAGNOSTIC
STRIP MISCELLANEOUS
Qty: 200 STRIP | Refills: 5 | Status: SHIPPED | OUTPATIENT
Start: 2023-03-09

## 2023-03-28 ENCOUNTER — CARE COORDINATION (OUTPATIENT)
Facility: CLINIC | Age: 58
End: 2023-03-28

## 2023-03-28 NOTE — CARE COORDINATION
.Patient has graduated from the Complex Care program on 3/28/23. Patient/family has the ability to self-manage at this time. Care management goals have been completed. No further Ambulatory Care Manager follow up scheduled. Goals Addressed    None         Patient has Ambulatory Care Manager's contact information for any further questions, concerns, or needs. Patients upcoming visits:  No future appointments.

## 2023-05-09 RX ORDER — LEVETIRACETAM 500 MG/1
TABLET ORAL
Qty: 60 TABLET | Refills: 1 | Status: SHIPPED | OUTPATIENT
Start: 2023-05-09

## 2023-05-09 NOTE — TELEPHONE ENCOUNTER
Deysi Burleson called for their medication refill. Last Office visit:  1/25/2023    Last Filled: 1/16/2023; Qty 60 w/ 0 refills     Follow up visit:  No future appointments.

## 2023-05-14 DIAGNOSIS — G89.29 CHRONIC BILATERAL LOW BACK PAIN, UNSPECIFIED WHETHER SCIATICA PRESENT: ICD-10-CM

## 2023-05-14 DIAGNOSIS — M54.50 CHRONIC BILATERAL LOW BACK PAIN, UNSPECIFIED WHETHER SCIATICA PRESENT: ICD-10-CM

## 2023-05-15 NOTE — TELEPHONE ENCOUNTER
Cristina Henry called for their medication refill. Last Office visit:  1/25/2023    Last Filled: 12/1/2022; Qty 60 w/ 0 refills     Follow up visit:  No future appointments.

## 2023-05-17 RX ORDER — TIZANIDINE 4 MG/1
TABLET ORAL
Qty: 60 TABLET | Refills: 1 | Status: SHIPPED | OUTPATIENT
Start: 2023-05-17

## 2023-06-26 RX ORDER — AMITRIPTYLINE HYDROCHLORIDE 25 MG/1
TABLET, FILM COATED ORAL
Qty: 90 TABLET | Refills: 0 | Status: SHIPPED | OUTPATIENT
Start: 2023-06-26

## 2023-06-26 RX ORDER — HYDROCHLOROTHIAZIDE 12.5 MG/1
TABLET ORAL
Qty: 90 TABLET | Refills: 0 | Status: SHIPPED | OUTPATIENT
Start: 2023-06-26

## 2023-07-11 RX ORDER — LEVETIRACETAM 500 MG/1
TABLET ORAL
Qty: 60 TABLET | Refills: 1 | Status: SHIPPED | OUTPATIENT
Start: 2023-07-11

## 2023-07-19 DIAGNOSIS — M54.50 CHRONIC BILATERAL LOW BACK PAIN, UNSPECIFIED WHETHER SCIATICA PRESENT: ICD-10-CM

## 2023-07-19 DIAGNOSIS — G89.29 CHRONIC BILATERAL LOW BACK PAIN, UNSPECIFIED WHETHER SCIATICA PRESENT: ICD-10-CM

## 2023-07-21 RX ORDER — TIZANIDINE 4 MG/1
TABLET ORAL
Qty: 60 TABLET | Refills: 1 | Status: SHIPPED | OUTPATIENT
Start: 2023-07-21

## 2023-08-07 DIAGNOSIS — M54.50 CHRONIC BILATERAL LOW BACK PAIN, UNSPECIFIED WHETHER SCIATICA PRESENT: Primary | ICD-10-CM

## 2023-08-07 DIAGNOSIS — G89.29 CHRONIC BILATERAL LOW BACK PAIN, UNSPECIFIED WHETHER SCIATICA PRESENT: Primary | ICD-10-CM

## 2023-08-07 RX ORDER — LORATADINE 10 MG/1
TABLET ORAL
Qty: 100 TABLET | Refills: 3 | Status: SHIPPED | OUTPATIENT
Start: 2023-08-07

## 2023-09-08 RX ORDER — LANCETS
EACH MISCELLANEOUS
COMMUNITY
End: 2023-09-08 | Stop reason: SDUPTHER

## 2023-09-08 RX ORDER — LANCETS
EACH MISCELLANEOUS
Qty: 200 EACH | Refills: 2 | Status: SHIPPED | OUTPATIENT
Start: 2023-09-08

## 2023-09-08 NOTE — TELEPHONE ENCOUNTER
Sarah Mercado called for their medication refill. Last Office visit:  01/25/23    Follow up visit:  No future appointments. no

## 2023-09-28 RX ORDER — LISINOPRIL 30 MG/1
30 TABLET ORAL DAILY
Qty: 90 TABLET | Refills: 1 | Status: SHIPPED | OUTPATIENT
Start: 2023-09-28

## 2023-09-28 NOTE — TELEPHONE ENCOUNTER
Received med refill request from [address] [place] [address] for the following:    Lisinopril 30 mg tab; qty 90 w/refills

## 2023-10-02 RX ORDER — HYDROCHLOROTHIAZIDE 12.5 MG/1
12.5 TABLET ORAL DAILY
Qty: 90 TABLET | Refills: 0 | Status: SHIPPED | OUTPATIENT
Start: 2023-10-02

## 2023-10-02 RX ORDER — AMITRIPTYLINE HYDROCHLORIDE 25 MG/1
25 TABLET, FILM COATED ORAL NIGHTLY
Qty: 90 TABLET | Refills: 0 | Status: SHIPPED | OUTPATIENT
Start: 2023-10-02

## 2023-10-02 NOTE — TELEPHONE ENCOUNTER
Keiry Lema called for their medication refill. Last Office visit:  1/25/2023    Last Filled: 6/26/2023; Qty 90 w/ 0 refill    Follow up visit:  No future appointments.

## 2023-11-20 RX ORDER — ROSUVASTATIN CALCIUM 20 MG/1
20 TABLET, COATED ORAL NIGHTLY
Qty: 30 TABLET | Refills: 1 | Status: SHIPPED | OUTPATIENT
Start: 2023-11-20

## 2023-11-20 NOTE — TELEPHONE ENCOUNTER
Maged Geller called for their medication refill. Last Office visit:  1/25/2023    Last Filled: 11/17/2022    Follow up visit:  No future appointments.

## 2023-11-21 RX ORDER — HYDROCHLOROTHIAZIDE 12.5 MG/1
12.5 TABLET ORAL DAILY
Qty: 90 TABLET | Refills: 3 | OUTPATIENT
Start: 2023-11-21

## 2023-11-21 RX ORDER — AMITRIPTYLINE HYDROCHLORIDE 25 MG/1
25 TABLET, FILM COATED ORAL
Qty: 90 TABLET | Refills: 3 | OUTPATIENT
Start: 2023-11-21

## 2023-11-21 NOTE — PROGRESS NOTES
Ehsan Moore is a 59 y.o. male (: 1965) presenting to address:    Chief Complaint   Patient presents with    Diabetes       Vitals:    22 1138   Temp: 96.8 °F (36 °C)   Weight: 220 lb 3.2 oz (99.9 kg)   Height: 6' 1\" (1.854 m)   PainSc:   0 - No pain       Hearing/Vision:   No exam data present    Learning Assessment:     Learning Assessment 2015   PRIMARY LEARNER Patient   HIGHEST LEVEL OF EDUCATION - PRIMARY LEARNER  GRADUATED HIGH SCHOOL OR GED   BARRIERS PRIMARY LEARNER NONE   CO-LEARNER CAREGIVER No   PRIMARY LANGUAGE ENGLISH   LEARNER PREFERENCE PRIMARY OTHER (COMMENT)   ANSWERED BY patient   RELATIONSHIP SELF     Depression Screening:     3 most recent PHQ Screens 2022   Little interest or pleasure in doing things Not at all   Feeling down, depressed, irritable, or hopeless Not at all   Total Score PHQ 2 0     Fall Risk Assessment:     Fall Risk Assessment, last 12 mths 2021   Able to walk? Yes   Fall in past 12 months? 0   Do you feel unsteady? 0   Are you worried about falling 0   Is TUG test greater than 12 seconds? -   Is the gait abnormal? -   Number of falls in past 12 months -   Fall with injury? -     Abuse Screening:     Abuse Screening Questionnaire 2021   Do you ever feel afraid of your partner? N   Are you in a relationship with someone who physically or mentally threatens you? N   Is it safe for you to go home?  Y     ADL Assessment:     ADL Assessment 2016   Feeding yourself No Help Needed   Getting from bed to chair No Help Needed   Getting dressed Help Needed   Bathing or showering No Help Needed   Walk across the room (includes cane/walker) No Help Needed   Using the telphone No Help Needed   Taking your medications No Help Needed   Preparing meals No Help Needed   Managing money (expenses/bills) No Help Needed   Moderately strenuous housework (laundry) Help Needed   Shopping for personal items (toiletries/medicines) Help Needed   Shopping for Regarding: F 70 swollen calf red and hard  ----- Message from Victorina Stanton sent at 11/21/2023  9:34 AM CST -----  Patient Name: Consuelo Baldwin    Specialist or PCP Name: n/a    Symptoms: swollen calf, red and hard     Pregnant (females aged 13-60. If Yes, how long?) : no    Call Back # : [phone]    Which State are you currently located in?: [place]    Name of Clinic Site / Acct# : n/a    Use following scripting for patients waiting for a callback:   \"Nurse callback times vary based on call volumes; please be aware the return phone call may come from an unidentified or out of state phone number. If your symptoms worsen or become life threatening while waiting, you should seek immediate assistance by calling 911 or going to the ER for evaluation.\"     groceries Help Needed   Driving Help Needed   Climbing a flight of stairs No Help Needed   Getting to places beyond walking distances No Help Needed        Coordination of Care Questionaire:   1. \"Have you been to the ER, urgent care clinic since your last visit? Hospitalized since your last visit? \" Yes When: FEB 21 [facility]    2. \"Have you seen or consulted any other health care providers outside of the [address] [place] [address] [place] since your last visit? \" No     3. For patients aged 39-70: Has the patient had a colonoscopy / FIT/ Cologuard? Yes - no Care Gap present      If the patient is female:    4. For patients aged 41-77: Has the patient had a mammogram within the past 2 years? NA - based on age or sex  See top three    5. For patients aged 21-65: Has the patient had a pap smear? NA - based on age or sex    Advanced Directive:   1. Do you have an Advanced Directive? NO    2. Would you like information on Advanced Directives?  NO

## 2023-11-22 DIAGNOSIS — M54.50 CHRONIC BILATERAL LOW BACK PAIN, UNSPECIFIED WHETHER SCIATICA PRESENT: ICD-10-CM

## 2023-11-22 DIAGNOSIS — G89.29 CHRONIC BILATERAL LOW BACK PAIN, UNSPECIFIED WHETHER SCIATICA PRESENT: ICD-10-CM

## 2023-11-24 RX ORDER — TIZANIDINE 4 MG/1
TABLET ORAL
Qty: 60 TABLET | Refills: 1 | Status: SHIPPED | OUTPATIENT
Start: 2023-11-24

## 2023-11-24 RX ORDER — LEVETIRACETAM 500 MG/1
500 TABLET ORAL 2 TIMES DAILY
Qty: 60 TABLET | Refills: 1 | Status: SHIPPED | OUTPATIENT
Start: 2023-11-24

## 2023-12-22 NOTE — TELEPHONE ENCOUNTER
Cat Bruce called for their medication refill. Last Office visit:  1/25/2023    Last Filled: 10/2/2023; Qty 90 w/ 0 refills - Amitriptyline   HCTZ- 10/2/2023; Qty 90 w/ 0 refills     Follow up visit:  No future appointments.

## 2023-12-26 RX ORDER — HYDROCHLOROTHIAZIDE 12.5 MG/1
12.5 TABLET ORAL DAILY
Qty: 90 TABLET | Refills: 3 | Status: SHIPPED | OUTPATIENT
Start: 2023-12-26

## 2023-12-26 RX ORDER — AMITRIPTYLINE HYDROCHLORIDE 25 MG/1
25 TABLET, FILM COATED ORAL NIGHTLY
Qty: 90 TABLET | Refills: 3 | Status: SHIPPED | OUTPATIENT
Start: 2023-12-26

## 2024-02-15 DIAGNOSIS — G89.29 CHRONIC BILATERAL LOW BACK PAIN, UNSPECIFIED WHETHER SCIATICA PRESENT: ICD-10-CM

## 2024-02-15 DIAGNOSIS — M54.50 CHRONIC BILATERAL LOW BACK PAIN, UNSPECIFIED WHETHER SCIATICA PRESENT: ICD-10-CM

## 2024-02-15 NOTE — TELEPHONE ENCOUNTER
[facility] refill fax request:    Requested Prescriptions     Pending Prescriptions Disp Refills    tiZANidine (ZANAFLEX) 4 MG tablet 60 tablet 1     Sig: take 1 tablet by mouth every 12 hours if needed for muscle spasm

## 2024-02-16 RX ORDER — TIZANIDINE 4 MG/1
TABLET ORAL
Qty: 60 TABLET | Refills: 1 | OUTPATIENT
Start: 2024-02-16

## 2024-02-16 NOTE — TELEPHONE ENCOUNTER
Pharmacy faxed over another medication refill request.  Requested Prescriptions     Pending Prescriptions Disp Refills    tiZANidine (ZANAFLEX) 4 MG tablet 60 tablet 1     Sig: take 1 tablet by mouth every 12 hours if needed for muscle spasm

## 2024-02-23 RX ORDER — LISINOPRIL 30 MG/1
30 TABLET ORAL DAILY
Qty: 90 TABLET | Refills: 3 | OUTPATIENT
Start: 2024-02-23

## 2024-04-04 RX ORDER — LANCETS
EACH MISCELLANEOUS
Qty: 204 EACH | Refills: 3 | OUTPATIENT
Start: 2024-04-04